# Patient Record
Sex: MALE | Race: WHITE | NOT HISPANIC OR LATINO | Employment: OTHER | URBAN - METROPOLITAN AREA
[De-identification: names, ages, dates, MRNs, and addresses within clinical notes are randomized per-mention and may not be internally consistent; named-entity substitution may affect disease eponyms.]

---

## 2019-06-13 ENCOUNTER — HOSPITAL ENCOUNTER (OUTPATIENT)
Dept: RADIOLOGY | Facility: MEDICAL CENTER | Age: 79
End: 2019-06-13
Attending: UROLOGY
Payer: MEDICARE

## 2019-06-13 DIAGNOSIS — C61 MALIGNANT NEOPLASM OF PROSTATE (HCC): ICD-10-CM

## 2019-06-13 PROCEDURE — A9588 FLUCICLOVINE F-18: HCPCS

## 2020-11-05 ENCOUNTER — APPOINTMENT (OUTPATIENT)
Dept: RADIOLOGY | Facility: MEDICAL CENTER | Age: 80
DRG: 453 | End: 2020-11-05
Attending: EMERGENCY MEDICINE
Payer: MEDICARE

## 2020-11-05 ENCOUNTER — HOSPITAL ENCOUNTER (OUTPATIENT)
Dept: RADIOLOGY | Facility: MEDICAL CENTER | Age: 80
End: 2020-11-05
Payer: MEDICARE

## 2020-11-05 ENCOUNTER — ANESTHESIA EVENT (OUTPATIENT)
Dept: SURGERY | Facility: MEDICAL CENTER | Age: 80
DRG: 453 | End: 2020-11-05
Payer: MEDICARE

## 2020-11-05 ENCOUNTER — ANESTHESIA (OUTPATIENT)
Dept: SURGERY | Facility: MEDICAL CENTER | Age: 80
DRG: 453 | End: 2020-11-05
Payer: MEDICARE

## 2020-11-05 ENCOUNTER — APPOINTMENT (OUTPATIENT)
Dept: RADIOLOGY | Facility: MEDICAL CENTER | Age: 80
DRG: 453 | End: 2020-11-05
Attending: NEUROLOGICAL SURGERY
Payer: MEDICARE

## 2020-11-05 ENCOUNTER — HOSPITAL ENCOUNTER (INPATIENT)
Facility: MEDICAL CENTER | Age: 80
LOS: 6 days | DRG: 453 | End: 2020-11-11
Attending: EMERGENCY MEDICINE | Admitting: SURGERY
Payer: MEDICARE

## 2020-11-05 ENCOUNTER — APPOINTMENT (OUTPATIENT)
Dept: RADIOLOGY | Facility: MEDICAL CENTER | Age: 80
DRG: 453 | End: 2020-11-05
Attending: NURSE PRACTITIONER
Payer: MEDICARE

## 2020-11-05 DIAGNOSIS — S15.101A: ICD-10-CM

## 2020-11-05 DIAGNOSIS — R13.12 OROPHARYNGEAL DYSPHAGIA: ICD-10-CM
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
## 2020-11-05 DIAGNOSIS — S42.202A TRAUMATIC CLOSED NONDISPLACED FRACTURE OF PROXIMAL END OF LEFT HUMERUS, INITIAL ENCOUNTER: ICD-10-CM

## 2020-11-05 DIAGNOSIS — S06.5XAA SUBDURAL HEMATOMA, ACUTE (HCC): ICD-10-CM

## 2020-11-05 DIAGNOSIS — S12.500A CLOSED DISPLACED FRACTURE OF SIXTH CERVICAL VERTEBRA, UNSPECIFIED FRACTURE MORPHOLOGY, INITIAL ENCOUNTER (HCC): ICD-10-CM

## 2020-11-05 DIAGNOSIS — S12.590A OTHER CLOSED DISPLACED FRACTURE OF SIXTH CERVICAL VERTEBRA, INITIAL ENCOUNTER (HCC): ICD-10-CM

## 2020-11-05 DIAGNOSIS — Z02.9 DISCHARGE PLANNING ISSUES: ICD-10-CM

## 2020-11-05 PROBLEM — I62.00 SUBDURAL HEMORRHAGE (HCC): Status: ACTIVE | Noted: 2020-11-05

## 2020-11-05 PROBLEM — Z53.09 CONTRAINDICATION TO DEEP VEIN THROMBOSIS (DVT) PROPHYLAXIS: Status: ACTIVE | Noted: 2020-11-05

## 2020-11-05 PROBLEM — T14.90XA TRAUMA: Status: ACTIVE | Noted: 2020-11-05

## 2020-11-05 PROBLEM — Z11.9 SCREENING EXAMINATION FOR INFECTIOUS DISEASE: Status: ACTIVE | Noted: 2020-11-05

## 2020-11-05 PROBLEM — E11.9 DM2 (DIABETES MELLITUS, TYPE 2) (HCC): Status: ACTIVE | Noted: 2020-11-05

## 2020-11-05 LAB
ABO + RH BLD: NORMAL
ABO GROUP BLD: NORMAL
ALBUMIN SERPL BCP-MCNC: 4.5 G/DL (ref 3.2–4.9)
ALBUMIN/GLOB SERPL: 1.6 G/DL
ALP SERPL-CCNC: 53 U/L (ref 30–99)
ALT SERPL-CCNC: 38 U/L (ref 2–50)
ANION GAP SERPL CALC-SCNC: 15 MMOL/L (ref 7–16)
APTT PPP: 26.5 SEC (ref 24.7–36)
AST SERPL-CCNC: 41 U/L (ref 12–45)
BILIRUB SERPL-MCNC: 0.4 MG/DL (ref 0.1–1.5)
BLD GP AB SCN SERPL QL: NORMAL
BUN SERPL-MCNC: 23 MG/DL (ref 8–22)
CALCIUM SERPL-MCNC: 10.4 MG/DL (ref 8.5–10.5)
CFT BLD TEG: 4.7 MIN (ref 5–10)
CHLORIDE SERPL-SCNC: 102 MMOL/L (ref 96–112)
CLOT ANGLE BLD TEG: 60.9 DEGREES (ref 53–72)
CLOT LYSIS 30M P MA LENFR BLD TEG: 0 % (ref 0–8)
CO2 SERPL-SCNC: 22 MMOL/L (ref 20–33)
COVID ORDER STATUS COVID19: NORMAL
CREAT SERPL-MCNC: 1.05 MG/DL (ref 0.5–1.4)
CT.EXTRINSIC BLD ROTEM: 2.2 MIN (ref 1–3)
EKG IMPRESSION: NORMAL
ERYTHROCYTE [DISTWIDTH] IN BLOOD BY AUTOMATED COUNT: 44.7 FL (ref 35.9–50)
ETHANOL BLD-MCNC: <10.1 MG/DL (ref 0–10)
GLOBULIN SER CALC-MCNC: 2.8 G/DL (ref 1.9–3.5)
GLUCOSE BLD-MCNC: 222 MG/DL (ref 65–99)
GLUCOSE BLD-MCNC: 236 MG/DL (ref 65–99)
GLUCOSE SERPL-MCNC: 145 MG/DL (ref 65–99)
HCT VFR BLD AUTO: 44.1 % (ref 42–52)
HGB BLD-MCNC: 15 G/DL (ref 14–18)
INR PPP: 1.02 (ref 0.87–1.13)
MCF BLD TEG: 65.7 MM (ref 50–70)
MCH RBC QN AUTO: 30.2 PG (ref 27–33)
MCHC RBC AUTO-ENTMCNC: 34 G/DL (ref 33.7–35.3)
MCV RBC AUTO: 88.7 FL (ref 81.4–97.8)
PA AA BLD-ACNC: 94.7 %
PA ADP BLD-ACNC: 53.4 %
PLATELET # BLD AUTO: 206 K/UL (ref 164–446)
PMV BLD AUTO: 10.5 FL (ref 9–12.9)
POTASSIUM SERPL-SCNC: 4.7 MMOL/L (ref 3.6–5.5)
PROT SERPL-MCNC: 7.3 G/DL (ref 6–8.2)
PROTHROMBIN TIME: 13.7 SEC (ref 12–14.6)
RBC # BLD AUTO: 4.97 M/UL (ref 4.7–6.1)
RH BLD: NORMAL
SARS-COV-2 RNA RESP QL NAA+PROBE: NOTDETECTED
SODIUM SERPL-SCNC: 139 MMOL/L (ref 135–145)
SPECIMEN SOURCE: NORMAL
TEG ALGORITHM TGALG: ABNORMAL
WBC # BLD AUTO: 15.5 K/UL (ref 4.8–10.8)

## 2020-11-05 PROCEDURE — 86900 BLOOD TYPING SEROLOGIC ABO: CPT

## 2020-11-05 PROCEDURE — 80053 COMPREHEN METABOLIC PANEL: CPT

## 2020-11-05 PROCEDURE — 770001 HCHG ROOM/CARE - MED/SURG/GYN PRIV*

## 2020-11-05 PROCEDURE — 93005 ELECTROCARDIOGRAM TRACING: CPT | Performed by: NURSE PRACTITIONER

## 2020-11-05 PROCEDURE — 94760 N-INVAS EAR/PLS OXIMETRY 1: CPT

## 2020-11-05 PROCEDURE — 80307 DRUG TEST PRSMV CHEM ANLYZR: CPT

## 2020-11-05 PROCEDURE — 0RT30ZZ RESECTION OF CERVICAL VERTEBRAL DISC, OPEN APPROACH: ICD-10-PCS | Performed by: NEUROLOGICAL SURGERY

## 2020-11-05 PROCEDURE — 700111 HCHG RX REV CODE 636 W/ 250 OVERRIDE (IP): Performed by: ANESTHESIOLOGY

## 2020-11-05 PROCEDURE — 99285 EMERGENCY DEPT VISIT HI MDM: CPT

## 2020-11-05 PROCEDURE — 160041 HCHG SURGERY MINUTES - EA ADDL 1 MIN LEVEL 4: Performed by: NEUROLOGICAL SURGERY

## 2020-11-05 PROCEDURE — 502000 HCHG MISC OR IMPLANTS RC 0278: Performed by: NEUROLOGICAL SURGERY

## 2020-11-05 PROCEDURE — 305948 HCHG GREEN TRAUMA ACT PRE-NOTIFY NO CC

## 2020-11-05 PROCEDURE — C9803 HOPD COVID-19 SPEC COLLECT: HCPCS | Performed by: NURSE PRACTITIONER

## 2020-11-05 PROCEDURE — 500112 HCHG BONE WAX: Performed by: NEUROLOGICAL SURGERY

## 2020-11-05 PROCEDURE — 502240 HCHG MISC OR SUPPLY RC 0272: Performed by: NEUROLOGICAL SURGERY

## 2020-11-05 PROCEDURE — 500367 HCHG DRAIN KIT, HEMOVAC: Performed by: NEUROLOGICAL SURGERY

## 2020-11-05 PROCEDURE — 0RG10A0 FUSION OF CERVICAL VERTEBRAL JOINT WITH INTERBODY FUSION DEVICE, ANTERIOR APPROACH, ANTERIOR COLUMN, OPEN APPROACH: ICD-10-PCS | Performed by: NEUROLOGICAL SURGERY

## 2020-11-05 PROCEDURE — 85384 FIBRINOGEN ACTIVITY: CPT

## 2020-11-05 PROCEDURE — 86850 RBC ANTIBODY SCREEN: CPT

## 2020-11-05 PROCEDURE — 85610 PROTHROMBIN TIME: CPT

## 2020-11-05 PROCEDURE — 160029 HCHG SURGERY MINUTES - 1ST 30 MINS LEVEL 4: Performed by: NEUROLOGICAL SURGERY

## 2020-11-05 PROCEDURE — 110454 HCHG SHELL REV 250: Performed by: NEUROLOGICAL SURGERY

## 2020-11-05 PROCEDURE — 4A11X4G MONITORING OF PERIPHERAL NERVOUS ELECTRICAL ACTIVITY, INTRAOPERATIVE, EXTERNAL APPROACH: ICD-10-PCS | Performed by: NEUROLOGICAL SURGERY

## 2020-11-05 PROCEDURE — 72040 X-RAY EXAM NECK SPINE 2-3 VW: CPT

## 2020-11-05 PROCEDURE — 160035 HCHG PACU - 1ST 60 MINS PHASE I: Performed by: NEUROLOGICAL SURGERY

## 2020-11-05 PROCEDURE — 700105 HCHG RX REV CODE 258: Performed by: NURSE PRACTITIONER

## 2020-11-05 PROCEDURE — 72141 MRI NECK SPINE W/O DYE: CPT

## 2020-11-05 PROCEDURE — 700111 HCHG RX REV CODE 636 W/ 250 OVERRIDE (IP): Performed by: NEUROLOGICAL SURGERY

## 2020-11-05 PROCEDURE — 160048 HCHG OR STATISTICAL LEVEL 1-5: Performed by: NEUROLOGICAL SURGERY

## 2020-11-05 PROCEDURE — 95939 C MOTOR EVOKED UPR&LWR LIMBS: CPT | Performed by: NEUROLOGICAL SURGERY

## 2020-11-05 PROCEDURE — 160002 HCHG RECOVERY MINUTES (STAT): Performed by: NEUROLOGICAL SURGERY

## 2020-11-05 PROCEDURE — 160009 HCHG ANES TIME/MIN: Performed by: NEUROLOGICAL SURGERY

## 2020-11-05 PROCEDURE — 95925 SOMATOSENSORY TESTING: CPT | Performed by: NEUROLOGICAL SURGERY

## 2020-11-05 PROCEDURE — 85730 THROMBOPLASTIN TIME PARTIAL: CPT

## 2020-11-05 PROCEDURE — 500864 HCHG NEEDLE, SPINAL 18G: Performed by: NEUROLOGICAL SURGERY

## 2020-11-05 PROCEDURE — 95940 IONM IN OPERATNG ROOM 15 MIN: CPT | Performed by: NEUROLOGICAL SURGERY

## 2020-11-05 PROCEDURE — 501838 HCHG SUTURE GENERAL: Performed by: NEUROLOGICAL SURGERY

## 2020-11-05 PROCEDURE — U0003 INFECTIOUS AGENT DETECTION BY NUCLEIC ACID (DNA OR RNA); SEVERE ACUTE RESPIRATORY SYNDROME CORONAVIRUS 2 (SARS-COV-2) (CORONAVIRUS DISEASE [COVID-19]), AMPLIFIED PROBE TECHNIQUE, MAKING USE OF HIGH THROUGHPUT TECHNOLOGIES AS DESCRIBED BY CMS-2020-01-R: HCPCS

## 2020-11-05 PROCEDURE — 700101 HCHG RX REV CODE 250: Performed by: NEUROLOGICAL SURGERY

## 2020-11-05 PROCEDURE — 85027 COMPLETE CBC AUTOMATED: CPT

## 2020-11-05 PROCEDURE — C1713 ANCHOR/SCREW BN/BN,TIS/BN: HCPCS | Performed by: NEUROLOGICAL SURGERY

## 2020-11-05 PROCEDURE — 95937 NEUROMUSCULAR JUNCTION TEST: CPT | Performed by: NEUROLOGICAL SURGERY

## 2020-11-05 PROCEDURE — 36415 COLL VENOUS BLD VENIPUNCTURE: CPT

## 2020-11-05 PROCEDURE — 86901 BLOOD TYPING SEROLOGIC RH(D): CPT

## 2020-11-05 PROCEDURE — 71045 X-RAY EXAM CHEST 1 VIEW: CPT

## 2020-11-05 PROCEDURE — 82962 GLUCOSE BLOOD TEST: CPT

## 2020-11-05 PROCEDURE — 700105 HCHG RX REV CODE 258: Performed by: ANESTHESIOLOGY

## 2020-11-05 PROCEDURE — 95861 NEEDLE EMG 2 EXTREMITIES: CPT | Performed by: NEUROLOGICAL SURGERY

## 2020-11-05 PROCEDURE — 85347 COAGULATION TIME ACTIVATED: CPT

## 2020-11-05 PROCEDURE — 0RG2070 FUSION OF 2 OR MORE CERVICAL VERTEBRAL JOINTS WITH AUTOLOGOUS TISSUE SUBSTITUTE, ANTERIOR APPROACH, ANTERIOR COLUMN, OPEN APPROACH: ICD-10-PCS | Performed by: NEUROLOGICAL SURGERY

## 2020-11-05 PROCEDURE — 700101 HCHG RX REV CODE 250: Performed by: ANESTHESIOLOGY

## 2020-11-05 PROCEDURE — 85576 BLOOD PLATELET AGGREGATION: CPT | Mod: 91

## 2020-11-05 PROCEDURE — 70450 CT HEAD/BRAIN W/O DYE: CPT

## 2020-11-05 DEVICE — IMPLANTABLE DEVICE: Type: IMPLANTABLE DEVICE | Status: FUNCTIONAL

## 2020-11-05 RX ORDER — FAMOTIDINE 20 MG/1
20 TABLET, FILM COATED ORAL 2 TIMES DAILY
Status: DISCONTINUED | OUTPATIENT
Start: 2020-11-05 | End: 2020-11-06

## 2020-11-05 RX ORDER — DIPHENHYDRAMINE HYDROCHLORIDE 50 MG/ML
25 INJECTION INTRAMUSCULAR; INTRAVENOUS EVERY 6 HOURS PRN
Status: DISCONTINUED | OUTPATIENT
Start: 2020-11-05 | End: 2020-11-07

## 2020-11-05 RX ORDER — DEXAMETHASONE SODIUM PHOSPHATE 4 MG/ML
INJECTION, SOLUTION INTRA-ARTICULAR; INTRALESIONAL; INTRAMUSCULAR; INTRAVENOUS; SOFT TISSUE PRN
Status: DISCONTINUED | OUTPATIENT
Start: 2020-11-05 | End: 2020-11-05 | Stop reason: SURG

## 2020-11-05 RX ORDER — IBUPROFEN 200 MG
600 CAPSULE ORAL EVERY MORNING
Status: ON HOLD | COMMUNITY
End: 2020-11-17

## 2020-11-05 RX ORDER — DOCUSATE SODIUM 100 MG/1
100 CAPSULE, LIQUID FILLED ORAL 2 TIMES DAILY
Status: DISCONTINUED | OUTPATIENT
Start: 2020-11-05 | End: 2020-11-10

## 2020-11-05 RX ORDER — CEFAZOLIN SODIUM 1 G/3ML
INJECTION, POWDER, FOR SOLUTION INTRAMUSCULAR; INTRAVENOUS
Status: DISCONTINUED | OUTPATIENT
Start: 2020-11-05 | End: 2020-11-05 | Stop reason: HOSPADM

## 2020-11-05 RX ORDER — ACETAMINOPHEN 325 MG/1
650 TABLET ORAL EVERY 6 HOURS
Status: DISPENSED | OUTPATIENT
Start: 2020-11-05 | End: 2020-11-10

## 2020-11-05 RX ORDER — BUPIVACAINE HYDROCHLORIDE AND EPINEPHRINE 5; 5 MG/ML; UG/ML
INJECTION, SOLUTION EPIDURAL; INTRACAUDAL; PERINEURAL
Status: DISCONTINUED | OUTPATIENT
Start: 2020-11-05 | End: 2020-11-05 | Stop reason: HOSPADM

## 2020-11-05 RX ORDER — SODIUM CHLORIDE, SODIUM LACTATE, POTASSIUM CHLORIDE, CALCIUM CHLORIDE 600; 310; 30; 20 MG/100ML; MG/100ML; MG/100ML; MG/100ML
INJECTION, SOLUTION INTRAVENOUS CONTINUOUS
Status: DISCONTINUED | OUTPATIENT
Start: 2020-11-05 | End: 2020-11-05 | Stop reason: HOSPADM

## 2020-11-05 RX ORDER — OXYCODONE HCL 5 MG/5 ML
10 SOLUTION, ORAL ORAL
Status: DISCONTINUED | OUTPATIENT
Start: 2020-11-05 | End: 2020-11-05 | Stop reason: HOSPADM

## 2020-11-05 RX ORDER — METOPROLOL TARTRATE 1 MG/ML
1 INJECTION, SOLUTION INTRAVENOUS
Status: DISCONTINUED | OUTPATIENT
Start: 2020-11-05 | End: 2020-11-05 | Stop reason: HOSPADM

## 2020-11-05 RX ORDER — AMOXICILLIN 250 MG
1 CAPSULE ORAL NIGHTLY
Status: DISCONTINUED | OUTPATIENT
Start: 2020-11-05 | End: 2020-11-10

## 2020-11-05 RX ORDER — SITAGLIPTIN 100 MG/1
100 TABLET, FILM COATED ORAL DAILY
Status: ON HOLD | COMMUNITY
Start: 2020-10-01 | End: 2020-11-22 | Stop reason: SDUPTHER

## 2020-11-05 RX ORDER — HYDROMORPHONE HYDROCHLORIDE 1 MG/ML
0.4 INJECTION, SOLUTION INTRAMUSCULAR; INTRAVENOUS; SUBCUTANEOUS
Status: DISCONTINUED | OUTPATIENT
Start: 2020-11-05 | End: 2020-11-05 | Stop reason: HOSPADM

## 2020-11-05 RX ORDER — POLYETHYLENE GLYCOL 3350 17 G/17G
1 POWDER, FOR SOLUTION ORAL 2 TIMES DAILY
Status: DISCONTINUED | OUTPATIENT
Start: 2020-11-05 | End: 2020-11-05

## 2020-11-05 RX ORDER — ONDANSETRON 2 MG/ML
INJECTION INTRAMUSCULAR; INTRAVENOUS PRN
Status: DISCONTINUED | OUTPATIENT
Start: 2020-11-05 | End: 2020-11-05 | Stop reason: SURG

## 2020-11-05 RX ORDER — AMOXICILLIN 250 MG
1 CAPSULE ORAL NIGHTLY
Status: DISCONTINUED | OUTPATIENT
Start: 2020-11-05 | End: 2020-11-05

## 2020-11-05 RX ORDER — HYDROMORPHONE HYDROCHLORIDE 1 MG/ML
0.2 INJECTION, SOLUTION INTRAMUSCULAR; INTRAVENOUS; SUBCUTANEOUS
Status: DISCONTINUED | OUTPATIENT
Start: 2020-11-05 | End: 2020-11-05 | Stop reason: HOSPADM

## 2020-11-05 RX ORDER — OXYCODONE HCL 5 MG/5 ML
5 SOLUTION, ORAL ORAL
Status: DISCONTINUED | OUTPATIENT
Start: 2020-11-05 | End: 2020-11-05 | Stop reason: HOSPADM

## 2020-11-05 RX ORDER — HEPARIN SODIUM 5000 [USP'U]/ML
5000 INJECTION, SOLUTION INTRAVENOUS; SUBCUTANEOUS EVERY 8 HOURS
Status: DISCONTINUED | OUTPATIENT
Start: 2020-11-06 | End: 2020-11-10

## 2020-11-05 RX ORDER — DOCUSATE SODIUM 100 MG/1
100 CAPSULE, LIQUID FILLED ORAL 2 TIMES DAILY
Status: DISCONTINUED | OUTPATIENT
Start: 2020-11-05 | End: 2020-11-05

## 2020-11-05 RX ORDER — CEFAZOLIN SODIUM 1 G/3ML
INJECTION, POWDER, FOR SOLUTION INTRAMUSCULAR; INTRAVENOUS PRN
Status: DISCONTINUED | OUTPATIENT
Start: 2020-11-05 | End: 2020-11-05 | Stop reason: SURG

## 2020-11-05 RX ORDER — POLYETHYLENE GLYCOL 3350 17 G/17G
1 POWDER, FOR SOLUTION ORAL 2 TIMES DAILY PRN
Status: DISCONTINUED | OUTPATIENT
Start: 2020-11-05 | End: 2020-11-10

## 2020-11-05 RX ORDER — METFORMIN HYDROCHLORIDE EXTENDED-RELEASE TABLETS 1000 MG/1
1000 TABLET, FILM COATED, EXTENDED RELEASE ORAL
Status: ON HOLD | COMMUNITY
Start: 2020-10-01 | End: 2020-11-22 | Stop reason: SDUPTHER

## 2020-11-05 RX ORDER — AMOXICILLIN 250 MG
1 CAPSULE ORAL
Status: DISCONTINUED | OUTPATIENT
Start: 2020-11-05 | End: 2020-11-11 | Stop reason: HOSPADM

## 2020-11-05 RX ORDER — BISACODYL 10 MG
10 SUPPOSITORY, RECTAL RECTAL
Status: DISCONTINUED | OUTPATIENT
Start: 2020-11-05 | End: 2020-11-10

## 2020-11-05 RX ORDER — LEVETIRACETAM 500 MG/1
500 TABLET ORAL 2 TIMES DAILY
Status: DISCONTINUED | OUTPATIENT
Start: 2020-11-05 | End: 2020-11-11 | Stop reason: HOSPADM

## 2020-11-05 RX ORDER — ENEMA 19; 7 G/133ML; G/133ML
1 ENEMA RECTAL
Status: DISCONTINUED | OUTPATIENT
Start: 2020-11-05 | End: 2020-11-11 | Stop reason: HOSPADM

## 2020-11-05 RX ORDER — SODIUM CHLORIDE, SODIUM LACTATE, POTASSIUM CHLORIDE, CALCIUM CHLORIDE 600; 310; 30; 20 MG/100ML; MG/100ML; MG/100ML; MG/100ML
INJECTION, SOLUTION INTRAVENOUS
Status: DISCONTINUED | OUTPATIENT
Start: 2020-11-05 | End: 2020-11-05 | Stop reason: SURG

## 2020-11-05 RX ORDER — ONDANSETRON 2 MG/ML
4 INJECTION INTRAMUSCULAR; INTRAVENOUS
Status: DISCONTINUED | OUTPATIENT
Start: 2020-11-05 | End: 2020-11-05 | Stop reason: HOSPADM

## 2020-11-05 RX ORDER — ALBUTEROL SULFATE 90 UG/1
2 AEROSOL, METERED RESPIRATORY (INHALATION) EVERY 6 HOURS PRN
Status: ON HOLD | COMMUNITY
Start: 2020-09-28 | End: 2020-11-22 | Stop reason: SDUPTHER

## 2020-11-05 RX ORDER — HALOPERIDOL 5 MG/ML
1 INJECTION INTRAMUSCULAR
Status: DISCONTINUED | OUTPATIENT
Start: 2020-11-05 | End: 2020-11-05 | Stop reason: HOSPADM

## 2020-11-05 RX ORDER — LIDOCAINE HYDROCHLORIDE 20 MG/ML
INJECTION, SOLUTION EPIDURAL; INFILTRATION; INTRACAUDAL; PERINEURAL PRN
Status: DISCONTINUED | OUTPATIENT
Start: 2020-11-05 | End: 2020-11-05 | Stop reason: SURG

## 2020-11-05 RX ORDER — ONDANSETRON 4 MG/1
4 TABLET, ORALLY DISINTEGRATING ORAL EVERY 4 HOURS PRN
Status: DISCONTINUED | OUTPATIENT
Start: 2020-11-05 | End: 2020-11-10

## 2020-11-05 RX ORDER — LOVASTATIN 40 MG/1
40 TABLET ORAL DAILY
Status: ON HOLD | COMMUNITY
Start: 2020-09-19 | End: 2020-11-22 | Stop reason: SDUPTHER

## 2020-11-05 RX ORDER — LABETALOL HYDROCHLORIDE 5 MG/ML
10 INJECTION, SOLUTION INTRAVENOUS EVERY 6 HOURS PRN
Status: DISCONTINUED | OUTPATIENT
Start: 2020-11-05 | End: 2020-11-10

## 2020-11-05 RX ORDER — CEFAZOLIN SODIUM 2 G/100ML
2 INJECTION, SOLUTION INTRAVENOUS EVERY 8 HOURS
Status: COMPLETED | OUTPATIENT
Start: 2020-11-06 | End: 2020-11-06

## 2020-11-05 RX ORDER — HYDROMORPHONE HYDROCHLORIDE 1 MG/ML
0.25 INJECTION, SOLUTION INTRAMUSCULAR; INTRAVENOUS; SUBCUTANEOUS
Status: DISCONTINUED | OUTPATIENT
Start: 2020-11-05 | End: 2020-11-06

## 2020-11-05 RX ORDER — SUCCINYLCHOLINE/SOD CL,ISO/PF 200MG/10ML
SYRINGE (ML) INTRAVENOUS PRN
Status: DISCONTINUED | OUTPATIENT
Start: 2020-11-05 | End: 2020-11-05 | Stop reason: SURG

## 2020-11-05 RX ORDER — ENEMA 19; 7 G/133ML; G/133ML
1 ENEMA RECTAL
Status: DISCONTINUED | OUTPATIENT
Start: 2020-11-05 | End: 2020-11-05

## 2020-11-05 RX ORDER — METHOCARBAMOL 750 MG/1
750 TABLET, FILM COATED ORAL EVERY 8 HOURS PRN
Status: DISCONTINUED | OUTPATIENT
Start: 2020-11-05 | End: 2020-11-11 | Stop reason: HOSPADM

## 2020-11-05 RX ORDER — LABETALOL HYDROCHLORIDE 5 MG/ML
5 INJECTION, SOLUTION INTRAVENOUS
Status: DISCONTINUED | OUTPATIENT
Start: 2020-11-05 | End: 2020-11-05 | Stop reason: HOSPADM

## 2020-11-05 RX ORDER — AMOXICILLIN 250 MG
1 CAPSULE ORAL
Status: DISCONTINUED | OUTPATIENT
Start: 2020-11-05 | End: 2020-11-05

## 2020-11-05 RX ORDER — SODIUM CHLORIDE 9 MG/ML
INJECTION, SOLUTION INTRAVENOUS CONTINUOUS
Status: DISCONTINUED | OUTPATIENT
Start: 2020-11-05 | End: 2020-11-07

## 2020-11-05 RX ORDER — DIPHENHYDRAMINE HCL 25 MG
25 TABLET ORAL EVERY 6 HOURS PRN
Status: DISCONTINUED | OUTPATIENT
Start: 2020-11-05 | End: 2020-11-07

## 2020-11-05 RX ORDER — OXYCODONE HYDROCHLORIDE 5 MG/1
2.5 TABLET ORAL
Status: DISCONTINUED | OUTPATIENT
Start: 2020-11-05 | End: 2020-11-11 | Stop reason: HOSPADM

## 2020-11-05 RX ORDER — ONDANSETRON 2 MG/ML
4 INJECTION INTRAMUSCULAR; INTRAVENOUS EVERY 4 HOURS PRN
Status: DISCONTINUED | OUTPATIENT
Start: 2020-11-05 | End: 2020-11-05

## 2020-11-05 RX ORDER — PHENYLEPHRINE HCL IN 0.9% NACL 0.5 MG/5ML
SYRINGE (ML) INTRAVENOUS PRN
Status: DISCONTINUED | OUTPATIENT
Start: 2020-11-05 | End: 2020-11-05 | Stop reason: SURG

## 2020-11-05 RX ORDER — DEXTROSE MONOHYDRATE 25 G/50ML
50 INJECTION, SOLUTION INTRAVENOUS
Status: DISCONTINUED | OUTPATIENT
Start: 2020-11-05 | End: 2020-11-11 | Stop reason: HOSPADM

## 2020-11-05 RX ORDER — HYDROMORPHONE HYDROCHLORIDE 1 MG/ML
0.1 INJECTION, SOLUTION INTRAMUSCULAR; INTRAVENOUS; SUBCUTANEOUS
Status: DISCONTINUED | OUTPATIENT
Start: 2020-11-05 | End: 2020-11-05 | Stop reason: HOSPADM

## 2020-11-05 RX ORDER — ONDANSETRON 2 MG/ML
4 INJECTION INTRAMUSCULAR; INTRAVENOUS EVERY 4 HOURS PRN
Status: DISCONTINUED | OUTPATIENT
Start: 2020-11-05 | End: 2020-11-10

## 2020-11-05 RX ORDER — OXYCODONE HYDROCHLORIDE 5 MG/1
5 TABLET ORAL
Status: DISCONTINUED | OUTPATIENT
Start: 2020-11-05 | End: 2020-11-08

## 2020-11-05 RX ORDER — REMIFENTANIL HYDROCHLORIDE 1 MG/ML
INJECTION, POWDER, LYOPHILIZED, FOR SOLUTION INTRAVENOUS
Status: DISCONTINUED | OUTPATIENT
Start: 2020-11-05 | End: 2020-11-05 | Stop reason: SURG

## 2020-11-05 RX ADMIN — Medication 100 MCG: at 17:38

## 2020-11-05 RX ADMIN — LIDOCAINE HYDROCHLORIDE 100 MG: 20 INJECTION, SOLUTION EPIDURAL; INFILTRATION; INTRACAUDAL at 17:18

## 2020-11-05 RX ADMIN — DEXAMETHASONE SODIUM PHOSPHATE 10 MG: 4 INJECTION, SOLUTION INTRA-ARTICULAR; INTRALESIONAL; INTRAMUSCULAR; INTRAVENOUS; SOFT TISSUE at 17:18

## 2020-11-05 RX ADMIN — Medication 200 MG: at 17:18

## 2020-11-05 RX ADMIN — REMIFENTANIL HYDROCHLORIDE 0.13 MCG/KG/MIN: 1 INJECTION, POWDER, LYOPHILIZED, FOR SOLUTION INTRAVENOUS at 17:18

## 2020-11-05 RX ADMIN — Medication 100 MCG: at 17:32

## 2020-11-05 RX ADMIN — PHENYLEPHRINE HYDROCHLORIDE 60 MCG/MIN: 10 INJECTION INTRAVENOUS at 18:08

## 2020-11-05 RX ADMIN — CEFAZOLIN 2 G: 330 INJECTION, POWDER, FOR SOLUTION INTRAMUSCULAR; INTRAVENOUS at 17:18

## 2020-11-05 RX ADMIN — Medication 100 MCG: at 17:28

## 2020-11-05 RX ADMIN — SODIUM CHLORIDE, POTASSIUM CHLORIDE, SODIUM LACTATE AND CALCIUM CHLORIDE: 600; 310; 30; 20 INJECTION, SOLUTION INTRAVENOUS at 17:08

## 2020-11-05 RX ADMIN — SODIUM CHLORIDE: 9 INJECTION, SOLUTION INTRAVENOUS at 16:36

## 2020-11-05 RX ADMIN — ONDANSETRON 4 MG: 2 INJECTION INTRAMUSCULAR; INTRAVENOUS at 20:00

## 2020-11-05 RX ADMIN — EPHEDRINE SULFATE 10 MG: 50 INJECTION, SOLUTION INTRAVENOUS at 17:38

## 2020-11-05 RX ADMIN — Medication 100 MCG: at 17:52

## 2020-11-05 RX ADMIN — FENTANYL CITRATE 125 MCG: 50 INJECTION, SOLUTION INTRAMUSCULAR; INTRAVENOUS at 17:18

## 2020-11-05 RX ADMIN — PROPOFOL 160 MG: 10 INJECTION, EMULSION INTRAVENOUS at 17:18

## 2020-11-05 RX ADMIN — EPHEDRINE SULFATE 10 MG: 50 INJECTION, SOLUTION INTRAVENOUS at 17:52

## 2020-11-05 RX ADMIN — PROPOFOL 120 MCG/KG/MIN: 10 INJECTION, EMULSION INTRAVENOUS at 17:18

## 2020-11-05 SDOH — HEALTH STABILITY: MENTAL HEALTH: HOW OFTEN DO YOU HAVE A DRINK CONTAINING ALCOHOL?: 2-4 TIMES A MONTH

## 2020-11-05 ASSESSMENT — PATIENT HEALTH QUESTIONNAIRE - PHQ9
1. LITTLE INTEREST OR PLEASURE IN DOING THINGS: NOT AT ALL
2. FEELING DOWN, DEPRESSED, IRRITABLE, OR HOPELESS: NOT AT ALL
SUM OF ALL RESPONSES TO PHQ9 QUESTIONS 1 AND 2: 0

## 2020-11-05 ASSESSMENT — LIFESTYLE VARIABLES
HAVE YOU EVER FELT YOU SHOULD CUT DOWN ON YOUR DRINKING: NO
ON A TYPICAL DAY WHEN YOU DRINK ALCOHOL HOW MANY DRINKS DO YOU HAVE: 0
TOTAL SCORE: 0
CONSUMPTION TOTAL: NEGATIVE
HAVE PEOPLE ANNOYED YOU BY CRITICIZING YOUR DRINKING: NO
EVER HAD A DRINK FIRST THING IN THE MORNING TO STEADY YOUR NERVES TO GET RID OF A HANGOVER: NO
ALCOHOL_USE: NO
EVER FELT BAD OR GUILTY ABOUT YOUR DRINKING: NO
HOW MANY TIMES IN THE PAST YEAR HAVE YOU HAD 5 OR MORE DRINKS IN A DAY: 0
AVERAGE NUMBER OF DAYS PER WEEK YOU HAVE A DRINK CONTAINING ALCOHOL: 0

## 2020-11-05 ASSESSMENT — PAIN DESCRIPTION - PAIN TYPE: TYPE: SURGICAL PAIN

## 2020-11-05 ASSESSMENT — PAIN SCALES - GENERAL: PAIN_LEVEL: 0

## 2020-11-05 NOTE — ED TRIAGE NOTES
Chief Complaint   Patient presents with   • Trauma Green     Transfer from [facility] for SAH and C6 fracture     Pt brought in by EMS for above. Pt was getting out of his car to go golfing today when he had MGLF and hit his head on the sidewalk,  +LOC. Pt denies taking blood thinners. Pt has GCS of 14 with repetitive questioning up on arrival.

## 2020-11-05 NOTE — ASSESSMENT & PLAN NOTE
C6 vertebral body fracture with 6mm of retropulsion posteriorly resulting in severe spinal canal narrowing with involvement of pedicles with posterior displacement of the vertebral body.  MRI - with burst fracture of C6 with body with posterior elements.   - Nondisplaced compression fractures of C7 and T1.  - Discontinuity of anterior and posterior longitudinal ligaments & ligamentum flavum consistent with injury.  - Discontinuity of ligamentum flavum at the level of C1-C2.   - There is increased T2 signal intensity concerning for ligamentous injury at this level.  - Abnormal T2 hyperintensity in the posterior paraspinal muscles likely representing contusion and edema.   - Mild amount of anterior prevertebral hematoma at the levels of C7, T1 and T2.  - Mild amount of posterior lateral epidural hemorrhage noted extending from the skull base to the level of C6.  - There is anterior epidural hemorrhage at C5 and C6.  - Level of C5-6 and C6-7, there is effacement of the dorsal and ventral subarachnoid spaces secondary to the posterior retropulsion of posterior fracture and epidural hemorrhage. Severe central canal stenosis at this level.   -  Moderate central canal stenosis at the levels of C2-3, C3-4 and C4-5 secondary to the posterior lateral epidural hemorrhage.  11/5 OR for C6 corpectomy followed by C6/7 lami facetectomy and C5-T1 fusion.  11/9 Stage 2 posterior fusion, C5-T1  [place] collar  Phoenix Alvarez MD. Neurosurgeon. [facility].

## 2020-11-05 NOTE — ASSESSMENT & PLAN NOTE
Chronic condition treated with Metformin and Januvia.   Maintenance medications resumed   Insulin sliding scale.

## 2020-11-05 NOTE — ASSESSMENT & PLAN NOTE
Subdural blood tracking along the falx without significant adjacent mass effect.  Repeat interval head CT stable  TEG with platelet mapping with AA 72.4  No platelets given due to stable repeat CT   Non-operative management.  Post traumatic pharmacologic seizure prophylaxis for 1 week.  Speech Language Pathology cognitive evaluation. Recommend home health for continued speech therapy services  Phoenix Alvarez MD. Neurosurgeon. [facility].

## 2020-11-05 NOTE — ED PROVIDER NOTES
ED Provider Note    CHIEF COMPLAINT  Chief Complaint   Patient presents with   • Trauma Green     Transfer from [facility] for SAH and C6 fracture       HPI  Isaias Ortiz is a 80 y.o. male who presents to the emergency department transferred from [facility] for subdural hematoma and a C6 fracture.  The patient was walking in a parking lot and tripped in a mechanical fall and hit his head.  This was a mechanical fall this was not syncopal.  He had head pain, and neck pain.  He went to [facility] where CT scan of the head showed falcine subdural hematoma.  He is not on blood thinners.  He also has C6 fracture and was sent here for further work-up and treatment.  He has no numbness or tingling or weakness.  He did not injure his upper extremities, chest, abdomen, back or lower extremities.  Denies any other injuries or complaints.  No cough or Covid exposures.    REVIEW OF SYSTEMS  See HPI for further details. All other systems are negative.    PAST MEDICAL HISTORY  Past Medical History:   Diagnosis Date   • Chronic obstructive pulmonary disease (HCC)    • Diabetes (HCC)    • High cholesterol    • Hypertension    • Prostate cancer (HCC)     past hx       FAMILY HISTORY  History reviewed. No pertinent family history.    SOCIAL HISTORY  Social History     Socioeconomic History   • Marital status:      Spouse name: Not on file   • Number of children: Not on file   • Years of education: Not on file   • Highest education level: Not on file   Occupational History   • Not on file   Social Needs   • Financial resource strain: Not on file   • Food insecurity     Worry: Not on file     Inability: Not on file   • Transportation needs     Medical: Not on file     Non-medical: Not on file   Tobacco Use   • Smoking status: Former Smoker   • Smokeless tobacco: Never Used   Substance and Sexual Activity   • Alcohol use: Yes     Frequency: 2-4 times a month   • Drug use: Never   • Sexual activity: Not on  "file   Lifestyle   • Physical activity     Days per week: Not on file     Minutes per session: Not on file   • Stress: Not on file   Relationships   • Social connections     Talks on phone: Not on file     Gets together: Not on file     Attends Muslim service: Not on file     Active member of club or organization: Not on file     Attends meetings of clubs or organizations: Not on file     Relationship status: Not on file   • Intimate partner violence     Fear of current or ex partner: Not on file     Emotionally abused: Not on file     Physically abused: Not on file     Forced sexual activity: Not on file   Other Topics Concern   • Not on file   Social History Narrative   • Not on file       SURGICAL HISTORY  Past Surgical History:   Procedure Laterality Date   • CYST EXCISION      from rectum       CURRENT MEDICATIONS  Home Medications    **Home medications have not yet been reviewed for this encounter**         ALLERGIES  Allergies   Allergen Reactions   • Codeine Anaphylaxis   • Opium Unspecified       PHYSICAL EXAM  VITAL SIGNS: BP (!) 171/81   Pulse 89   Temp 36.8 °C (98.2 °F) (Temporal)   Resp 18   Ht 1.727 m (5' 8\")   Wt 97.1 kg (214 lb)   SpO2 94%   BMI 32.54 kg/m²    Constitutional: Awake alert nontoxic in spinal precautions no acute distress  HENT: Normocephalic, Atraumatic, Bilateral external ears normal, Oropharynx moist, No oral exudates, Nose normal.   Eyes: PERRL, EOMI, Conjunctiva normal, No discharge.   Neck: In cervical collar, not ranged.  Cardiovascular: Normal heart rate, Normal rhythm, No murmurs  Thorax & Lungs: Normal breath sounds, No respiratory distress,  Abdomen: Bowel sounds normal, Soft, No tenderness  Skin: Warm, Dry, No erythema, No rash.   Back: No tenderness, No CVA tenderness.   Musculoskeletal: Good range of motion in all major joints.  Neurologic: Alert, No focal deficits noted.  Normal strength and sensation in the upper and lower extremities.  Psychiatric: Affect " normal    Results for orders placed or performed during the hospital encounter of 11/05/20   DIAGNOSTIC ALCOHOL   Result Value Ref Range    Diagnostic Alcohol <10.1 0.0 - 10.0 mg/dL   Comp Metabolic Panel   Result Value Ref Range    Sodium 139 135 - 145 mmol/L    Potassium 4.7 3.6 - 5.5 mmol/L    Chloride 102 96 - 112 mmol/L    Co2 22 20 - 33 mmol/L    Anion Gap 15.0 7.0 - 16.0    Glucose 145 (H) 65 - 99 mg/dL    Bun 23 (H) 8 - 22 mg/dL    Creatinine 1.05 0.50 - 1.40 mg/dL    Calcium 10.4 8.5 - 10.5 mg/dL    AST(SGOT) 41 12 - 45 U/L    ALT(SGPT) 38 2 - 50 U/L    Alkaline Phosphatase 53 30 - 99 U/L    Total Bilirubin 0.4 0.1 - 1.5 mg/dL    Albumin 4.5 3.2 - 4.9 g/dL    Total Protein 7.3 6.0 - 8.2 g/dL    Globulin 2.8 1.9 - 3.5 g/dL    A-G Ratio 1.6 g/dL   CBC WITHOUT DIFFERENTIAL   Result Value Ref Range    WBC 15.5 (H) 4.8 - 10.8 K/uL    RBC 4.97 4.70 - 6.10 M/uL    Hemoglobin 15.0 14.0 - 18.0 g/dL    Hematocrit 44.1 42.0 - 52.0 %    MCV 88.7 81.4 - 97.8 fL    MCH 30.2 27.0 - 33.0 pg    MCHC 34.0 33.7 - 35.3 g/dL    RDW 44.7 35.9 - 50.0 fL    Platelet Count 206 164 - 446 K/uL    MPV 10.5 9.0 - 12.9 fL   Prothrombin Time   Result Value Ref Range    PT 13.7 12.0 - 14.6 sec    INR 1.02 0.87 - 1.13   APTT   Result Value Ref Range    APTT 26.5 24.7 - 36.0 sec   ESTIMATED GFR   Result Value Ref Range    GFR If African American >60 >60 mL/min/1.73 m 2    GFR If Non African American >60 >60 mL/min/1.73 m 2        RADIOLOGY/PROCEDURES  OUTSIDE IMAGES-CT HEAD   Final Result      OUTSIDE IMAGES-CT CERVICAL SPINE   Final Result      DX-CHEST-LIMITED (1 VIEW)   Final Result      1.  No acute cardiac or pulmonary abnormalities are identified.   2.  There appears to be a proximal left humerus fracture. Correlation with physical exam is recommended.      MR-CERVICAL SPINE-W/O    (Results Pending)   EC-ECHOCARDIOGRAM COMPLETE W/ CONT    (Results Pending)   CT-HEAD W/O    (Results Pending)       COURSE & MEDICAL DECISION  MAKING  Pertinent Labs & Imaging studies reviewed. (See chart for details)    The patient was a transfer from [facility].  Physicians notes, and images from the transfer were reviewed.    On arrival 1 view AP chest x-ray was obtained in the trauma room this was unremarkable for intrathoracic pathology.  There is a question of whether or not he had an arm fracture.    The patient's examination is not suggestive of fracture at all think he requires any further work-up or imaging for this.    The patient's head CT and cervical spine CT from the transferring hospital have been loaded and reviewed.  He has a C6 fracture that appears unstable with significant retropulsion.  He has a falcine subdural.    Discussed the case with Dr. Alvarez on-call for neurosurgery.  He requests an [place] collar and a stat MRI.  I spoke with the nurse to arrange traction applied [place] collar.  This is being performed.  Nurse is aware of the unstable nature of the cervical spine injury the patient must remain flat and remain n.p.o.  Preoperative Covid test is pending.    Dr. Alvarez request an MRI of the cervical spine be ordered stat this is completed.  I spoke with the trauma surgeon Dr. Ramos he will see the patient for hospitalization.  The patient and his wife are updated to the plan.      FINAL IMPRESSION  1. Fall, initial encounter     2. Subdural hematoma, acute (HCC)     3. Other closed displaced fracture of sixth cervical vertebra, initial encounter (HCC)     4.  Critical care time 45 minutes no procedures    2.   3.         Electronically signed by: Lavell Mckeon M.D., 11/5/2020 12:53 PM  s

## 2020-11-05 NOTE — ED NOTES
Pharmacy Medication Reconciliation    Medication reconciliation has been completed by interviewing patient with medication list and consent to do so with family/visitors in the room. Reviewed medication list and returned to pt family at bedside  Allergies were reviewed and updated   No ORAL antibiotics within the past 14 days  Pt home pharmacy:[facility]

## 2020-11-05 NOTE — PROGRESS NOTES
Consult note dictated. Unstable 3 column fracture with C6 burst and displacement over C2-6 DISH, compression. Will need trauma admit, stat MRI, keep npo  (patient last ate at 8am) and flat in collar, check covid test, OR today for C6 corpectomy followed by C6/7 lami facetectomy and C5-T1 fusion stage 2 likely on Monday. R/B/A explained to patient and he agrees.

## 2020-11-05 NOTE — ASSESSMENT & PLAN NOTE
Systemic anticoagulation contraindicated secondary to elevated bleeding risk.  11/6 Pharmacological DVT prophylaxis, Heparin, initiated.

## 2020-11-05 NOTE — CONSULTS
DATE OF SERVICE:  11/05/2020    NEUROSURGICAL INPATIENT CONSULTATION    CHIEF COMPLAINT:  Neck pain, ground level fall, 3-column C6 fracture.    HISTORY OF PRESENT ILLNESS:  This is a 80-year-old man who had a ground level   fall, reports he has had  only mild neck pain, no weakness or numbness.  His   outside hospital CAT scan of the head shows a minimal subdural hematoma   without any midline shift or fracture, but his cervical CT shows a C6 burst   fracture over a C2-C6 anterior cervical disk autofusion with retropulsion,   significant stenosis, and bilateral C6-C7 jumped facet as well as 3-column   fracture.  This is considered unstable.  He has a grade I anterolisthesis.    PAST MEDICAL HISTORY:  Significant for hypertension, hyperlipidemia, prostate   cancer, currently on hormonal treatment.    PAST SURGICAL HISTORY:  Negative except for the biopsy.    SOCIAL HISTORY:  He is a nonsmoker, social drinker.  He is a retired   .  His wife is with him at his visit.    PHYSICAL EXAMINATION:  The patient has full strength with deltoid, bicep,   tricep, , interossei, dorsiflexion, EHL, plantar flexion with intact   sensation.  No pathologic reflex.  He is awake, alert, and oriented x3.    Pupils are symmetric.  Extraocular motion intact.  Face is full.  His tongue   is midline.    ASSESSMENT AND PLAN:  The patient has an unstable 3-column fracture over a   disk autofusion.  I recommend n.p.o., stat MRI, COVID test.  He should be kept   flat.  We will go today for a C6 anterior corpectomy followed by a few days   rest and likely on Monday, we will do a posterior supplementation due to a   3-column injury and the lever arm that was created by the disk above, but   likely C5-T1 posterior fusion, admitted to the trauma service.  He was kept   flat in a collar.  He is getting a stat MRI, kept n.p.o.    I explained the risks, benefits, and alternatives of the procedure, including   pain, infection,  bleeding, CSF leak, failure to completely resolve symptoms,   neurologic deficit including weakness, numbness, bladder or bowel   difficulties, failure of fixation, failure of fusion, need for rostral caudal   extensions due to junctional stenosis, need for posterior fusion, injury to   trachea, carotid, esophagus, temporary or permanent speaking and swallowing   difficulties before surgical scheduling.    Thank you for allow me to participate in his care.       ____________________________________     MD KATHRYN Alvarado III / NTS    DD:  11/05/2020 13:57:55  DT:  11/05/2020 15:56:00    D#:  [id number]  Job#:  [id number]

## 2020-11-06 ENCOUNTER — APPOINTMENT (OUTPATIENT)
Dept: RADIOLOGY | Facility: MEDICAL CENTER | Age: 80
DRG: 453 | End: 2020-11-06
Attending: NURSE PRACTITIONER
Payer: MEDICARE

## 2020-11-06 ENCOUNTER — APPOINTMENT (OUTPATIENT)
Dept: CARDIOLOGY | Facility: MEDICAL CENTER | Age: 80
DRG: 453 | End: 2020-11-06
Attending: NURSE PRACTITIONER
Payer: MEDICARE

## 2020-11-06 PROBLEM — R55 SYNCOPE AND COLLAPSE: Status: ACTIVE | Noted: 2020-11-06

## 2020-11-06 PROBLEM — S15.101A: Status: ACTIVE | Noted: 2020-11-06

## 2020-11-06 PROBLEM — S42.202A: Status: ACTIVE | Noted: 2020-11-06

## 2020-11-06 PROBLEM — S06.5XAA TRAUMATIC SUBDURAL HEMORRHAGE (HCC): Status: ACTIVE | Noted: 2020-11-05

## 2020-11-06 PROBLEM — S15.102A: Status: ACTIVE | Noted: 2020-11-06

## 2020-11-06 PROBLEM — Z78.9 NO CONTRAINDICATION TO DEEP VEIN THROMBOSIS (DVT) PROPHYLAXIS: Status: ACTIVE | Noted: 2020-11-05

## 2020-11-06 LAB
ALBUMIN SERPL BCP-MCNC: 3.9 G/DL (ref 3.2–4.9)
ALBUMIN/GLOB SERPL: 1.5 G/DL
ALP SERPL-CCNC: 45 U/L (ref 30–99)
ALT SERPL-CCNC: 33 U/L (ref 2–50)
ANION GAP SERPL CALC-SCNC: 16 MMOL/L (ref 7–16)
AST SERPL-CCNC: 45 U/L (ref 12–45)
BASOPHILS # BLD AUTO: 0.1 % (ref 0–1.8)
BASOPHILS # BLD: 0.01 K/UL (ref 0–0.12)
BILIRUB SERPL-MCNC: 0.4 MG/DL (ref 0.1–1.5)
BUN SERPL-MCNC: 21 MG/DL (ref 8–22)
CALCIUM SERPL-MCNC: 9.2 MG/DL (ref 8.5–10.5)
CFT BLD TEG: 6.2 MIN (ref 5–10)
CFT P HPASE BLD TEG: 5.8 MIN (ref 5–10)
CHLORIDE SERPL-SCNC: 100 MMOL/L (ref 96–112)
CLOT ANGLE BLD TEG: 64.1 DEGREES (ref 53–72)
CLOT ANGLE P HPASE BLD TEG: 70.4 DEGREES (ref 53–72)
CLOT INIT P HPASE BLD TEG: 1.4 MIN (ref 1–3)
CLOT LYSIS 30M P MA LENFR BLD TEG: 0 % (ref 0–8)
CLOT LYSIS 30M P MA LENFR BLD TEG: 0 % (ref 0–8)
CO2 SERPL-SCNC: 22 MMOL/L (ref 20–33)
CREAT SERPL-MCNC: 1.08 MG/DL (ref 0.5–1.4)
CT.EXTRINSIC BLD ROTEM: 1.9 MIN (ref 1–3)
EOSINOPHIL # BLD AUTO: 0 K/UL (ref 0–0.51)
EOSINOPHIL NFR BLD: 0 % (ref 0–6.9)
ERYTHROCYTE [DISTWIDTH] IN BLOOD BY AUTOMATED COUNT: 44.4 FL (ref 35.9–50)
GLOBULIN SER CALC-MCNC: 2.6 G/DL (ref 1.9–3.5)
GLUCOSE BLD-MCNC: 133 MG/DL (ref 65–99)
GLUCOSE BLD-MCNC: 179 MG/DL (ref 65–99)
GLUCOSE BLD-MCNC: 179 MG/DL (ref 65–99)
GLUCOSE BLD-MCNC: 184 MG/DL (ref 65–99)
GLUCOSE SERPL-MCNC: 215 MG/DL (ref 65–99)
HCT VFR BLD AUTO: 40.3 % (ref 42–52)
HGB BLD-MCNC: 13.3 G/DL (ref 14–18)
IMM GRANULOCYTES # BLD AUTO: 0.07 K/UL (ref 0–0.11)
IMM GRANULOCYTES NFR BLD AUTO: 0.7 % (ref 0–0.9)
LV EJECT FRACT  99904: 60
LV EJECT FRACT MOD 4C 99902: 70.45
LYMPHOCYTES # BLD AUTO: 0.84 K/UL (ref 1–4.8)
LYMPHOCYTES NFR BLD: 8.2 % (ref 22–41)
MCF BLD TEG: 68.4 MM (ref 50–70)
MCF P HPASE BLD TEG: 70.6 MM (ref 50–70)
MCH RBC QN AUTO: 29.1 PG (ref 27–33)
MCHC RBC AUTO-ENTMCNC: 33 G/DL (ref 33.7–35.3)
MCV RBC AUTO: 88.2 FL (ref 81.4–97.8)
MONOCYTES # BLD AUTO: 0.43 K/UL (ref 0–0.85)
MONOCYTES NFR BLD AUTO: 4.2 % (ref 0–13.4)
NEUTROPHILS # BLD AUTO: 8.91 K/UL (ref 1.82–7.42)
NEUTROPHILS NFR BLD: 86.8 % (ref 44–72)
NRBC # BLD AUTO: 0 K/UL
NRBC BLD-RTO: 0 /100 WBC
PA AA BLD-ACNC: 72.4 %
PA ADP BLD-ACNC: 34.9 %
PLATELET # BLD AUTO: 199 K/UL (ref 164–446)
PMV BLD AUTO: 11.1 FL (ref 9–12.9)
POTASSIUM SERPL-SCNC: 4.2 MMOL/L (ref 3.6–5.5)
PROT SERPL-MCNC: 6.5 G/DL (ref 6–8.2)
RBC # BLD AUTO: 4.57 M/UL (ref 4.7–6.1)
SODIUM SERPL-SCNC: 138 MMOL/L (ref 135–145)
TEG ALGORITHM TGALG: NORMAL
WBC # BLD AUTO: 10.3 K/UL (ref 4.8–10.8)

## 2020-11-06 PROCEDURE — A9270 NON-COVERED ITEM OR SERVICE: HCPCS | Performed by: NURSE PRACTITIONER

## 2020-11-06 PROCEDURE — 36415 COLL VENOUS BLD VENIPUNCTURE: CPT

## 2020-11-06 PROCEDURE — 73060 X-RAY EXAM OF HUMERUS: CPT | Mod: LT

## 2020-11-06 PROCEDURE — 700102 HCHG RX REV CODE 250 W/ 637 OVERRIDE(OP): Performed by: PHYSICIAN ASSISTANT

## 2020-11-06 PROCEDURE — 700105 HCHG RX REV CODE 258: Performed by: NURSE PRACTITIONER

## 2020-11-06 PROCEDURE — 700117 HCHG RX CONTRAST REV CODE 255: Performed by: NURSE PRACTITIONER

## 2020-11-06 PROCEDURE — 80053 COMPREHEN METABOLIC PANEL: CPT

## 2020-11-06 PROCEDURE — 93306 TTE W/DOPPLER COMPLETE: CPT

## 2020-11-06 PROCEDURE — 85025 COMPLETE CBC W/AUTO DIFF WBC: CPT

## 2020-11-06 PROCEDURE — A9270 NON-COVERED ITEM OR SERVICE: HCPCS | Performed by: PHYSICIAN ASSISTANT

## 2020-11-06 PROCEDURE — 700102 HCHG RX REV CODE 250 W/ 637 OVERRIDE(OP): Performed by: NURSE PRACTITIONER

## 2020-11-06 PROCEDURE — 770001 HCHG ROOM/CARE - MED/SURG/GYN PRIV*

## 2020-11-06 PROCEDURE — 70498 CT ANGIOGRAPHY NECK: CPT

## 2020-11-06 PROCEDURE — 92523 SPEECH SOUND LANG COMPREHEN: CPT

## 2020-11-06 PROCEDURE — 82962 GLUCOSE BLOOD TEST: CPT

## 2020-11-06 PROCEDURE — 93306 TTE W/DOPPLER COMPLETE: CPT | Mod: 26 | Performed by: INTERNAL MEDICINE

## 2020-11-06 PROCEDURE — 700111 HCHG RX REV CODE 636 W/ 250 OVERRIDE (IP): Performed by: PHYSICIAN ASSISTANT

## 2020-11-06 RX ORDER — ALBUTEROL SULFATE 90 UG/1
2 AEROSOL, METERED RESPIRATORY (INHALATION) EVERY 6 HOURS PRN
Status: DISCONTINUED | OUTPATIENT
Start: 2020-11-06 | End: 2020-11-11 | Stop reason: HOSPADM

## 2020-11-06 RX ORDER — FLUTICASONE PROPIONATE 44 UG/1
2 AEROSOL, METERED RESPIRATORY (INHALATION)
Status: DISCONTINUED | OUTPATIENT
Start: 2020-11-06 | End: 2020-11-11 | Stop reason: HOSPADM

## 2020-11-06 RX ORDER — LOVASTATIN 20 MG/1
40 TABLET ORAL DAILY
Status: DISCONTINUED | OUTPATIENT
Start: 2020-11-06 | End: 2020-11-11 | Stop reason: HOSPADM

## 2020-11-06 RX ADMIN — CEFAZOLIN SODIUM 2 G: 2 INJECTION, SOLUTION INTRAVENOUS at 00:09

## 2020-11-06 RX ADMIN — THERA TABS 1 TABLET: TAB at 11:38

## 2020-11-06 RX ADMIN — HEPARIN SODIUM 5000 UNITS: 5000 INJECTION, SOLUTION INTRAVENOUS; SUBCUTANEOUS at 18:19

## 2020-11-06 RX ADMIN — INSULIN HUMAN 2 UNITS: 100 INJECTION, SOLUTION PARENTERAL at 13:51

## 2020-11-06 RX ADMIN — IOHEXOL 80 ML: 350 INJECTION, SOLUTION INTRAVENOUS at 13:46

## 2020-11-06 RX ADMIN — CEFAZOLIN SODIUM 2 G: 2 INJECTION, SOLUTION INTRAVENOUS at 09:43

## 2020-11-06 RX ADMIN — LEVETIRACETAM 500 MG: 500 TABLET ORAL at 04:52

## 2020-11-06 RX ADMIN — DOCUSATE SODIUM 100 MG: 100 CAPSULE ORAL at 00:09

## 2020-11-06 RX ADMIN — ACETAMINOPHEN 650 MG: 325 TABLET, FILM COATED ORAL at 16:47

## 2020-11-06 RX ADMIN — SODIUM CHLORIDE: 9 INJECTION, SOLUTION INTRAVENOUS at 23:10

## 2020-11-06 RX ADMIN — LOVASTATIN 40 MG: 20 TABLET ORAL at 11:38

## 2020-11-06 RX ADMIN — METHOCARBAMOL 750 MG: 750 TABLET ORAL at 19:14

## 2020-11-06 RX ADMIN — DOCUSATE SODIUM 100 MG: 100 CAPSULE ORAL at 16:47

## 2020-11-06 RX ADMIN — DOCUSATE SODIUM 50 MG AND SENNOSIDES 8.6 MG 1 TABLET: 8.6; 5 TABLET, FILM COATED ORAL at 20:45

## 2020-11-06 RX ADMIN — ACETAMINOPHEN 650 MG: 325 TABLET, FILM COATED ORAL at 04:52

## 2020-11-06 RX ADMIN — SODIUM CHLORIDE: 9 INJECTION, SOLUTION INTRAVENOUS at 11:39

## 2020-11-06 RX ADMIN — LEVETIRACETAM 500 MG: 500 TABLET ORAL at 16:47

## 2020-11-06 RX ADMIN — DOCUSATE SODIUM 100 MG: 100 CAPSULE ORAL at 04:53

## 2020-11-06 RX ADMIN — DOCUSATE SODIUM 50 MG AND SENNOSIDES 8.6 MG 1 TABLET: 8.6; 5 TABLET, FILM COATED ORAL at 00:09

## 2020-11-06 RX ADMIN — ACETAMINOPHEN 650 MG: 325 TABLET, FILM COATED ORAL at 23:09

## 2020-11-06 RX ADMIN — FAMOTIDINE 20 MG: 20 TABLET, FILM COATED ORAL at 04:52

## 2020-11-06 RX ADMIN — ACETAMINOPHEN 650 MG: 325 TABLET, FILM COATED ORAL at 11:38

## 2020-11-06 RX ADMIN — INSULIN HUMAN 2 UNITS: 100 INJECTION, SOLUTION PARENTERAL at 09:00

## 2020-11-06 RX ADMIN — ACETAMINOPHEN 650 MG: 325 TABLET, FILM COATED ORAL at 00:08

## 2020-11-06 RX ADMIN — INSULIN HUMAN 2 UNITS: 100 INJECTION, SOLUTION PARENTERAL at 20:48

## 2020-11-06 ASSESSMENT — ENCOUNTER SYMPTOMS
DOUBLE VISION: 0
FOCAL WEAKNESS: 0
VOMITING: 0
SHORTNESS OF BREATH: 0
SENSORY CHANGE: 0
NECK PAIN: 1
BACK PAIN: 0
STRIDOR: 0
ABDOMINAL PAIN: 0
FEVER: 0
MYALGIAS: 1
NAUSEA: 0
CHILLS: 0
SPEECH CHANGE: 0

## 2020-11-06 ASSESSMENT — COGNITIVE AND FUNCTIONAL STATUS - GENERAL
CLIMB 3 TO 5 STEPS WITH RAILING: A LITTLE
MOBILITY SCORE: 23
DRESSING REGULAR UPPER BODY CLOTHING: A LITTLE
DAILY ACTIVITIY SCORE: 20
SUGGESTED CMS G CODE MODIFIER MOBILITY: CI
HELP NEEDED FOR BATHING: A LITTLE
SUGGESTED CMS G CODE MODIFIER DAILY ACTIVITY: CJ
DRESSING REGULAR LOWER BODY CLOTHING: A LOT

## 2020-11-06 ASSESSMENT — PAIN DESCRIPTION - PAIN TYPE
TYPE: ACUTE PAIN;SURGICAL PAIN
TYPE: SURGICAL PAIN

## 2020-11-06 ASSESSMENT — PATIENT HEALTH QUESTIONNAIRE - PHQ9
SUM OF ALL RESPONSES TO PHQ9 QUESTIONS 1 AND 2: 0
1. LITTLE INTEREST OR PLEASURE IN DOING THINGS: NOT AT ALL
2. FEELING DOWN, DEPRESSED, IRRITABLE, OR HOPELESS: NOT AT ALL

## 2020-11-06 ASSESSMENT — COPD QUESTIONNAIRES
COPD SCREENING SCORE: 4
HAVE YOU SMOKED AT LEAST 100 CIGARETTES IN YOUR ENTIRE LIFE: NO/DON'T KNOW
DURING THE PAST 4 WEEKS HOW MUCH DID YOU FEEL SHORT OF BREATH: SOME OF THE TIME
DO YOU EVER COUGH UP ANY MUCUS OR PHLEGM?: NO/ONLY WITH OCCASIONAL COLDS OR INFECTIONS

## 2020-11-06 ASSESSMENT — FIBROSIS 4 INDEX: FIB4 SCORE: 3.07

## 2020-11-06 NOTE — ASSESSMENT & PLAN NOTE
11/5 MRI There is nonvisualization of the flow void of the right vertebral artery. This is uncertain whether this is a chronic finding or secondary to the vertebral dissection.   11/6 CTA   - right vertebral artery is patent though this small in size throughout its course no obvious dissection flap identified. The small size likely due to normal developmental anatomy though diffuse narrowing from dissection difficult to entirely exclude.   - The left vertebral artery is patent and appears dominant.  Non-operative management.  Phoenix Alvarez MD. Neurosurgeon. [facility].

## 2020-11-06 NOTE — THERAPY
Contact Note    Patient Name: Isaias Ortiz  Age:  80 y.o., Sex:  male  Medical Record #: [id number]  Today's Date: 11/6/2020    consult received; cervical post stage I; awaiting stage II; also has strict bedrest orders as well; will attempt post stage II as appropriate for dc planning/eval

## 2020-11-06 NOTE — ANESTHESIA PROCEDURE NOTES
Airway    Date/Time: 11/5/2020 5:18 PM  Performed by: Damian Jung D.O.  Authorized by: Damian Jung D.O.     Location:  OR  Urgency:  Elective  Indications for Airway Management:  Anesthesia      Spontaneous Ventilation: absent    Sedation Level:  Deep  Preoxygenated: Yes    Patient Position:  Sniffing  Mask Difficulty Assessment:  0 - not attempted  Final Airway Type:  Endotracheal airway  Final Endotracheal Airway:  NIM tube  Cuffed: Yes    Technique Used for Successful ETT Placement:  Direct laryngoscopy    Insertion Site:  Oral  Blade Type:  Glide  Laryngoscope Blade/Videolaryngoscope Blade Size:  4  Measured from:  Teeth  ETT to Teeth (cm):  25  Placement Verified by: auscultation and capnometry    Cormack-Lehane Classification:  Grade I - full view of glottis  Number of Attempts at Approach:  1

## 2020-11-06 NOTE — PROGRESS NOTES
Neurosurgery Progress Note    Subjective:  Postop day 1 C6 corpectomy with anterior cervical fusion C5-T1 for C6 burst fracture   Reports today minimal neck pain  Denies radicular arm pain  Denies dysphagia  Drain at 10 cc over 8 hours  Scheduled for stage II on Monday, posterior fusion, C5-T1    Exam:  Anterior cervical clean dry and intact, no evidence of hematoma or seroma  Motor strength 5/5  Sensation intact    BP  Min: 101/56  Max: 190/87  Pulse  Av  Min: 68  Max: 91  Resp  Av.8  Min: 13  Max: 45  Temp  Av.5 °C (97.7 °F)  Min: 35.8 °C (96.5 °F)  Max: 36.8 °C (98.3 °F)  SpO2  Av.8 %  Min: 87 %  Max: 97 %    No data recorded    Recent Labs     20  1231 20  0359   WBC 15.5* 10.3   RBC 4.97 4.57*   HEMOGLOBIN 15.0 13.3*   HEMATOCRIT 44.1 40.3*   MCV 88.7 88.2   MCH 30.2 29.1   MCHC 34.0 33.0*   RDW 44.7 44.4   PLATELETCT 206 199   MPV 10.5 11.1     Recent Labs     20  1231 20  0359   SODIUM 139 138   POTASSIUM 4.7 4.2   CHLORIDE 102 100   CO2 22 22   GLUCOSE 145* 215*   BUN 23* 21   CREATININE 1.05 1.08   CALCIUM 10.4 9.2     Recent Labs     20  1231   APTT 26.5   INR 1.02     Recent Labs     20  1231 20  2256   REACTMIN 4.7* 6.2   CLOTKINET 2.2 1.9   CLOTANGL 60.9 64.1   MAXCLOTS 65.7 68.4   OQO35MRZ 0.0 0.0   PRCINADP 53.4 34.9   PRCINAA 94.7 72.4       Intake/Output       20 - 20 - 2059      [id number] Total [id number] Total       Intake    I.V.  600  800 1400  --  -- --    Pre-Hospital Volume 0 -- 0 -- -- --    Trauma Resuscitation Volume 0 -- 0 -- -- --    Volume (mL) (Lactated Ringers)  -- -- --    Blood  0  -- 0  --  -- --    PRBC Total Volume (Non-Barcoded) 0 -- 0 -- -- --    FFP Total Volume (Non-Barcoded) 0 -- 0 -- -- --    Platelets Total Volume (Non-Barcoded) 0 -- 0 -- -- --    Cryoprecipitate (Pooled) Total Volume (Non-Barcoded) 0 -- 0 -- -- --    Total Intake 600  800 1400 -- -- --       Output    Urine  --  1500 1500  --  -- --    Urine -- 300 300 -- -- --    Output (mL) ([REMOVED] Urethral Catheter Latex;Temperature probe 16 Fr.) -- 1200 1200 -- -- --    Drains  --  10 10  --  -- --    Output (mL) (Closed/Suction Drain 1 Anterior Neck Hemovac 10 Fr.) -- 10 10 -- -- --    Other  0  -- 0  --  -- --    Pre-Hospital Output 0 -- 0 -- -- --    Trauma Resuscitation Output 0 -- 0 -- -- --    Blood  0  600 600  --  -- --    Est. Blood Loss 0 600 600 -- -- --    Total Output 0 2110 2110 -- -- --       Net I/O     600 -1310 -710 -- -- --            Intake/Output Summary (Last 24 hours) at 11/6/2020 0737  Last data filed at 11/6/2020 0500  Gross per 24 hour   Intake 1400 ml   Output 2110 ml   Net -710 ml            • Respiratory Therapy Consult   Continuous RT   • bisacodyl  10 mg Q24HRS PRN   • NS   Continuous   • acetaminophen  650 mg Q6HRS   • famotidine  20 mg BID    Or   • famotidine  20 mg BID   • levETIRAcetam  500 mg BID   • insulin regular  2-9 Units 4X/DAY ACHS    And   • glucose  16 g Q15 MIN PRN    And   • dextrose 50%  50 mL Q15 MIN PRN   • labetalol  10 mg Q6HRS PRN   • Pharmacy Consult Request  1 Each PHARMACY TO DOSE   • MD ALERT...DO NOT ADMINISTER NSAIDS or ASPIRIN unless ORDERED By Neurosurgery  1 Each PRN   • docusate sodium  100 mg BID   • senna-docusate  1 Tab Nightly   • senna-docusate  1 Tab Q24HRS PRN   • polyethylene glycol/lytes  1 Packet BID PRN   • magnesium hydroxide  30 mL QDAY PRN   • bisacodyl  10 mg Q24HRS PRN   • fleet  1 Each Once PRN   • heparin  5,000 Units Q8HRS   • ceFAZolin  2 g Q8HR   • diphenhydrAMINE  25 mg Q6HRS PRN    Or   • diphenhydrAMINE  25 mg Q6HRS PRN   • ondansetron  4 mg Q4HRS PRN   • ondansetron  4 mg Q4HRS PRN   • methocarbamol  750 mg Q8HRS PRN   • benzocaine-menthol  1 Lozenge Q2HRS PRN   • oxyCODONE immediate-release  2.5 mg Q3HRS PRN   • oxyCODONE immediate-release  5 mg Q3HRS PRN   • HYDROmorphone  0.25 mg Q3HRS PRN        Assessment and Plan:  POD #1 C6 corpectomy  Stage II Monday, posterior cervical fusion C5-T1  Keep head of bed less than 45 degrees  Leave drain in place  N.p.o. after midnight Sunday

## 2020-11-06 NOTE — ANESTHESIA POSTPROCEDURE EVALUATION
Patient: Isaias Ortiz    Procedure Summary     Date: 11/05/20 Room / Location: [facility] 05 / SURGERY [facility]    Anesthesia Start: 1708 Anesthesia Stop: 2022    Procedures:       DISCECTOMY, SPINE, CERVICAL, ANTERIOR APPROACH, WITH FUSION C5-7 (Neck)      CORPECTOMY, SPINE C6 (Neck) Diagnosis: (3-column C6 fracture)    Surgeons: Phoenix Alvarez III, M.D. Responsible Provider: Damian Jung D.O.    Anesthesia Type: general ASA Status: 2 - Emergent          Final Anesthesia Type: general  Last vitals  BP   Blood Pressure : 122/72    Temp   36.1 °C (97 °F)    Pulse   Pulse: 76   Resp   19    SpO2   93 %      Anesthesia Post Evaluation    Patient location during evaluation: PACU  Patient participation: complete - patient participated  Level of consciousness: awake and alert  Pain score: 0    Airway patency: patent  Anesthetic complications: no  Cardiovascular status: hemodynamically stable  Respiratory status: acceptable  Hydration status: euvolemic    PONV: none           Nurse Pain Score: 0 (NPRS)

## 2020-11-06 NOTE — ANESTHESIA QCDR
2019 [facility] Clinical Data Registry (for Quality Improvement)     Postoperative nausea/vomiting risk protocol (Adult = 18 yrs and Pediatric 3-17 yrs)- (430 and 463)  General inhalation anesthetic (NOT TIVA) with PONV risk factors: No  Provision of anti-emetic therapy with at least 2 different classes of agents: N/A  Patient DID NOT receive anti-emetic therapy and reason is documented in Medical Record: N/A    Multimodal Pain Management- (477)  Non-emergent surgery AND patient age >= 18: No  Use of Multimodal Pain Management, two or more drugs and/or interventions, NOT including systemic opioids:   Exception: Documented allergy to multiple classes of analgesics:     Smoking Abstinence (404)  Patient is current smoker (cigarette, pipe, e-cig, marijuanna): No  Elective Surgery:   Abstinence instructions provided prior to day of surgery:   Patient abstained from smoking on day of surgery:     Pre-Op Beta-Blocker in Isolated CABG (44)  Isolated CABG AND patient age >= 18: No  Beta-blocker admin within 24 hours of surgical incision:   Exception:of medical reason(s) for not administering beta blocker within 24 hours prior to surgical incision (e.g., not  indicated,other medical reason):     PACU assessment of acute postoperative pain prior to Anesthesia Care End- Applies to Patients Age = 18- (ABG7)  Initial PACU pain score is which of the following: < 7/10  Patient unable to report pain score: N/A    Post-anesthetic transfer of care checklist/protocol to PACU/ICU- (426 and 427)  Upon conclusion of case, patient transferred to which of the following locations: PACU/Non-ICU  Use of transfer checklist/protocol: Yes  Exclusion: Service Performed in Patient Hospital Room (and thus did not require transfer): N/A  Unplanned admission to ICU related to anesthesia service up through end of PACU care- (MD51)  Unplanned admission to ICU (not initially anticipated at anesthesia start time): No

## 2020-11-06 NOTE — THERAPY
Missed Therapy     Patient Name: Isaias Ortiz  Age:  80 y.o., Sex:  male  Medical Record #: [id number]  Today's Date: 11/6/2020    Discussed missed therapy with Maryana        11/06/20 0859   Interdisciplinary Plan of Care Collaboration   Collaboration Comments Order received for OT eval.  Pt is s/p stage 1 C-spine fusion & is on strict bedrest.  Stage II planned for Monday.  Will hold OT eval until cleared for OOB ADL's.

## 2020-11-06 NOTE — ANESTHESIA TIME REPORT
Anesthesia Start and Stop Event Times     Date Time Event    11/5/2020 1659 Ready for Procedure     1708 Anesthesia Start     2022 Anesthesia Stop        Responsible Staff  11/05/20    Name Role Begin End    Damian Jung D.O. Anesth 1708 2022        Preop Diagnosis (Free Text):  Pre-op Diagnosis     3-column C6 fracture        Preop Diagnosis (Codes):    Post op Diagnosis  Cervical spine fracture (HCC)      Premium Reason  B. 1st Call    Comments:

## 2020-11-06 NOTE — PROGRESS NOTES
"TRAUMA TERTIARY SURVEY     Mental status adequate for full examination?: Yes    Spine cleared (radiologically and/or clinically): No    PHYSICAL EXAMINATION:  Vitals: /73   Pulse 77   Temp 36.1 °C (96.9 °F) (Temporal)   Resp 15   Ht 1.727 m (5' 8\")   Wt 97.1 kg (214 lb)   SpO2 91%   BMI 32.54 kg/m²   Constitutional:     General Appearance: appears stated age, is in no apparent distress, is well developed and well nourished.  HEENT:     No significant external craniofacial trauma. The pupils are equal, round, and reactive to light bilaterally. The extraocular muscles are intact bilaterally.. The nares and oropharynx are clear. The midface and jaw are stable. No malocclusion is evident.  Neck:    The cervical spine is immobilized with a hard collar. Anterior cervical incision is dressed.    Respiratory:   Inspection: No stridor or difficulty swallowing Unlabored respirations, no intercostal retractions, paradoxical motion, or accessory muscle use.   Palpation:  The chest is nontender. The clavicles are non deformed bilaterally..   Auscultation: clear to auscultation.  Cardiovascular:   Auscultation: regular rate and rhythm.   Peripheral Pulses: Normal.   Abdomen:   Abdomen is soft, nontender, without organomegaly or masses.  Musculoskeletal:   The pelvis is stable.  No significant angulation, deformity, or soft tissue injury involving the upper and lower extremities. Normal range of motion.   Back:   The thoracolumbar spine was examined. Examination is remarkable for no significant tenderness, swelling, or deformity in the thoracolumbar region.  Skin:   The skin is warm and dry.  Neurologic:    [place] Coma Scale (GCS) 15 [id number]. Neurologic examination revealed no focal deficits noted, mental status intact, muscle strength normal.  Psychiatric:   The patient does not appear depressed or anxious.    IMAGING:  CT-HEAD W/O   Final Result         1.  Stable subarachnoid hemorrhages along the medial vertex of " the right frontal and parietal lobes.   2.  Stable subarachnoid hemorrhage along the inferior margin of the falx on the left centrally.   3.  Atherosclerosis.      DX-CERVICAL SPINE-2 OR 3 VIEWS   Final Result         1.  Intraoperative changes of cervical spine fusion in progress      DX-PORTABLE FLUORO > 1 HOUR   Final Result      Portable fluoroscopy utilized for 24 seconds.         INTERPRETING LOCATION: [address] [place], [place] [address], [id number]      MR-CERVICAL SPINE-W/O   Final Result      1.  There is burst fracture of C6 with body with posterior elements. There are also nondisplaced compression fractures of C7 and T1.   2.  There is discontinuity of anterior and posterior longitudinal ligaments and ligamentum flavum consistent with ligamentous injury.   3.  There is also discontinuity of ligamentum flavum at the level of C1-C2. There is increased T2 signal intensity concerning for ligamentous injury at this level.   4.   There is abnormal T2 hyperintensity in the posterior paraspinal muscles likely representing contusion and edema. There is mild amount of anterior prevertebral hematoma at the levels of C7, T1 and T2.   5.  There is mild amount of posterior lateral epidural hemorrhage noted extending from the skull base to the level of C6. There is anterior epidural hemorrhage at C5 and C6.   6.  At the level of C5-6 and C6-7, there is effacement of the dorsal and ventral subarachnoid spaces secondary to the posterior retropulsion of posterior fracture and epidural hemorrhage. There is severe central canal stenosis at this level. There is no    abnormal intramedullary T2 signal intensity at this level.   7.  Moderate central canal stenosis at the levels of C2-3, C3-4 and C4-5 secondary to the posterior lateral epidural hemorrhage.   8.  There is nonvisualization of the flow void of the right vertebral artery. This is uncertain whether this is a chronic finding or secondary to the vertebral dissection. The left vertebral  artery flow-void is widely patent.   9.  There is an approximately 9 mm left thyroid nodule.      OUTSIDE IMAGES-CT HEAD   Final Result      OUTSIDE IMAGES-CT CERVICAL SPINE   Final Result      DX-CHEST-LIMITED (1 VIEW)   Final Result      1.  No acute cardiac or pulmonary abnormalities are identified.   2.  There appears to be a proximal left humerus fracture. Correlation with physical exam is recommended.      EC-ECHOCARDIOGRAM COMPLETE W/ CONT    (Results Pending)     All current laboratory studies/radiology exams reviewed: Yes    Completed Consultations:  Neurosurgery      Pending Consultations:  None    Newly Identified Diagnoses and Injuries:  Right vertebral artery injury.   CXR with possible proximal left humerus fracture.  (no pain on exam)    TOTAL RAP SCORE:  RAP Score Total: 9      ETOH Screening  CAGE Score: 0  Assessment complete date: 11/6/2020 (ODALYS negative. No intervention warrented)

## 2020-11-06 NOTE — PROGRESS NOTES
"One transporter arrived at bedside with wheelchair to transport patient to X-ray and then CT. Patient ambulated with standby assist from chair wheelchair. Patient A+O x 4 and understands indications for X-ray and CT.    /71   Pulse 79   Temp 36.1 °C (97 °F) (Temporal)   Resp 17   Ht 1.727 m (5' 8\")   Wt 99.2 kg (218 lb 11.1 oz)   SpO2 92%    "

## 2020-11-06 NOTE — OR SURGEON
Immediate Post OP Note    PreOp Diagnosis: C6 fracture    PostOp Diagnosis: same    Procedure(s):  DISCECTOMY, SPINE, CERVICAL, ANTERIOR APPROACH, WITH FUSION C5-7 - Wound Class: Clean with Drain  CORPECTOMY, SPINE C6 - Wound Class: Clean with Drain    Surgeon(s):  Phoenix Alvarez III, M.D.    Anesthesiologist/Type of Anesthesia:  Anesthesiologist: Damian Jung D.O./General    Surgical Staff:  Assistant: Mikael Reynoso P.A.-C.  Circulator: Damian Jacobs R.N.  Relief Circulator: Lupis Santa R.N.  Relief Scrub: Neeraj Rick  Scrub Person: Brenda Good  Radiology Technologist: Jg Reynoso    Specimens removed if any:  * No specimens in log *    Estimated Blood Loss: 400cc    Findings: fracture C6    Complications: None. Neuro monitoring stable.         11/5/2020 8:22 PM Mikael Reynoso P.A.-C.

## 2020-11-06 NOTE — ANESTHESIA PROCEDURE NOTES
Arterial Line  Performed by: Damian Jung D.O.  Authorized by: Damian Jung D.O.     Start Time:  11/5/2020 5:22 PM  End Time:  11/5/2020 5:26 PM  Localization: ultrasound guidance and surface landmarks    Patient Location:  OR  Indication: continuous blood pressure monitoring        Catheter Size:  20 G  Seldinger Technique?: Yes    Laterality:  Right  Site:  Radial artery  Line Secured:  Antimicrobial disc, tape and transparent dressing  Events: patient tolerated procedure well with no complications

## 2020-11-06 NOTE — PROGRESS NOTES
Saw patient in PACU  Noted admit TEG AA 94.7  Repeat head CT not completed due to patient being in OR   Repeat head CT pending and RN to call to reschedule  Repeat TEG post op

## 2020-11-06 NOTE — ANESTHESIA PREPROCEDURE EVALUATION
Relevant Problems   CARDIAC   (+) Hypertension      ENDO   (+) DM2 (diabetes mellitus, type 2) (HCC)      Other   (+) C6 cervical fracture (HCC)   (+) Subdural hemorrhage (HCC)       Physical Exam    Airway   Mallampati: III  TM distance: <3 FB  Neck ROM: limited       Cardiovascular - normal exam  Rhythm: regular  Rate: normal  (-) murmur     Dental - normal exam           Pulmonary - normal exam  Breath sounds clear to auscultation     Abdominal    Neurological - normal exam                 Anesthesia Plan    ASA 2- EMERGENT   ASA physical status emergent criteria: displaced fracture with possible neurovascular compromise    Plan - general       Airway plan will be ETT        Induction: intravenous    Postoperative Plan: Postoperative administration of opioids is intended.    Pertinent diagnostic labs and testing reviewed    Informed Consent:    Anesthetic plan and risks discussed with patient.    Use of blood products discussed with: patient whom consented to blood products.

## 2020-11-06 NOTE — PROGRESS NOTES
Received from ED via cindy arnold MAE while in bed, aspen collar on, POC discussed, to preop via bed accompanied by wife and transport, CHG bath done.

## 2020-11-06 NOTE — H&P
Trauma Surgery History and Physical    Chief Complaint: Ground-level fall    History of Present Illness: The patient is a 80-year-old man who sustained a ground-level fall.  He apparently tripped over something in a parking lot and fell and struck his head.  He did not lose consciousness but did experience pain in his head and neck.  He was seen at an outside facility and noted to have a traumatic brain injury as well as a C-spine fracture.  He was transferred here for further pain.  He does complain of neck and head pain.  He denies weakness, tingling, or numbness.  He also denies any abdominal pain.    Triage Category: The patient was triaged as a Trauma Green Transfer activation. The patient was initially evaluated at [facility] in [place], [place] where The above injuries were noted. The patient was transported to [facility] in [place], [place] for a definitive neurotrauma evaluation. An expeditious primary and secondary survey with required adjuncts was conducted. See Trauma Narrator for full details.    Past Medical History:  has a past medical history of Chronic obstructive pulmonary disease (HCC), Diabetes ([facility]), High cholesterol, Hypertension, and Prostate cancer ([facility]).    Past Surgical History:  has a past surgical history that includes cyst excision.    Allergies:   Allergies   Allergen Reactions   • Codeine Anaphylaxis   • Opium Anaphylaxis       Current Medications:    Home Medications     Reviewed by Sindy Serrano R.N. (Registered Nurse) on 11/05/20 at 1707  Med List Status: Complete   Medication Last Dose Status   albuterol 108 (90 Base) MCG/ACT Aero Soln inhalation aerosol 11/2/2020 Active   Calcium 600 MG Tab 11/5/2020 Active   Cholecalciferol (VITAMIN D3 PO) 11/5/2020 Active   JANUVIA 100 MG Tab 11/5/2020 Active   lovastatin (MEVACOR) 40 MG tablet 11/5/2020 Active   metFORMIN ER 1000 MG TABLET SR 24 HR 11/5/2020 Active   Multiple Vitamin (MULTIVITAMIN PO)  "11/5/2020 Active   TRELEGY ELLIPTA 100-62.5-25 MCG/INH AEROSOL POWDER, BREATH ACTIVATED 11/5/2020 Active                Family History: family history is not on file.    Social History:  reports that he has quit smoking. He has never used smokeless tobacco. He reports current alcohol use. He reports that he does not use drugs.    Review of Systems: Comprehensive review of systems is negative with the exception of the aforementioned HPI, PMH, and PSH bullets in accordance with CMS guidelines.    Physical Examination:     Constitutional:     Vital Signs: /78   Pulse 82   Temp 36.7 °C (98 °F) (Temporal)   Resp 14   Ht 1.727 m (5' 8\")   Wt 97.1 kg (214 lb)   SpO2 (!) 87%    General Appearance: The patient is a cooperative elderly man in no critical distress.  HEENT:    No significant external craniofacial trauma. The pupils are equal, round, and reactive to light bilaterally. The extraocular muscles are intact bilaterally. The ear canals and tympanic membranes are clear bilaterally. The nares and oropharynx are clear. The midface and jaw are stable.  No malocclusion is evident.  Neck:    The cervical spine is immobilized with a hard collar.  Respiratory:   Inspection: Unlabored respirations, no intercostal retractions, paradoxical motion, or accessory muscle use.   Palpation:  The chest is nontender. The clavicles are non deformed bilaterally.   Auscultation: clear to auscultation.  Cardiovascular:   Inspection: The skin is warm.  Auscultation: Regular rate and rhythm.   Peripheral Pulses: Normal.   Abdomen:   Inspection: Abdominal inspection reveals no abrasions, contusions, lacerations or penetrating wounds.   Palpation: Palpation is remarkable for no significant tenderness, guarding, or peritoneal findings.  Musculoskeletal:   The pelvis is stable. No significant angulation, deformity, or soft tissue injury involving the upper and lower extremities.  Back:   The thoracolumbar spine was examined utilizing " spinal motion restriction. Examination is remarkable for no significant tenderness, swelling, or deformity in the thoracolumbar region.  Skin:    Examination of the skin reveals no significant abrasions, contusion, lacerations, or other soft tissue injury.  Neurologic:    [place] Coma Scale (GCS) 15.    Neurologic examination reveals no focal deficits noted.  Psychiatric:   The patient does not appear depressed or anxious.    Laboratory Values:   Recent Labs     11/05/20  1231   WBC 15.5*   RBC 4.97   HEMOGLOBIN 15.0   HEMATOCRIT 44.1   MCV 88.7   MCH 30.2   MCHC 34.0   RDW 44.7   PLATELETCT 206   MPV 10.5     Recent Labs     11/05/20  1231   SODIUM 139   POTASSIUM 4.7   CHLORIDE 102   CO2 22   GLUCOSE 145*   BUN 23*   CREATININE 1.05   CALCIUM 10.4     Recent Labs     11/05/20  1231   ASTSGOT 41   ALTSGPT 38   TBILIRUBIN 0.4   ALKPHOSPHAT 53   GLOBULIN 2.8   INR 1.02     Recent Labs     11/05/20  1231   APTT 26.5   INR 1.02        Imaging:   MR-CERVICAL SPINE-W/O   Final Result      1.  There is burst fracture of C6 with body with posterior elements. There are also nondisplaced compression fractures of C7 and T1.   2.  There is discontinuity of anterior and posterior longitudinal ligaments and ligamentum flavum consistent with ligamentous injury.   3.  There is also discontinuity of ligamentum flavum at the level of C1-C2. There is increased T2 signal intensity concerning for ligamentous injury at this level.   4.   There is abnormal T2 hyperintensity in the posterior paraspinal muscles likely representing contusion and edema. There is mild amount of anterior prevertebral hematoma at the levels of C7, T1 and T2.   5.  There is mild amount of posterior lateral epidural hemorrhage noted extending from the skull base to the level of C6. There is anterior epidural hemorrhage at C5 and C6.   6.  At the level of C5-6 and C6-7, there is effacement of the dorsal and ventral subarachnoid spaces secondary to the posterior  retropulsion of posterior fracture and epidural hemorrhage. There is severe central canal stenosis at this level. There is no    abnormal intramedullary T2 signal intensity at this level.   7.  Moderate central canal stenosis at the levels of C2-3, C3-4 and C4-5 secondary to the posterior lateral epidural hemorrhage.   8.  There is nonvisualization of the flow void of the right vertebral artery. This is uncertain whether this is a chronic finding or secondary to the vertebral dissection. The left vertebral artery flow-void is widely patent.   9.  There is an approximately 9 mm left thyroid nodule.      OUTSIDE IMAGES-CT HEAD   Final Result      OUTSIDE IMAGES-CT CERVICAL SPINE   Final Result      DX-CHEST-LIMITED (1 VIEW)   Final Result      1.  No acute cardiac or pulmonary abnormalities are identified.   2.  There appears to be a proximal left humerus fracture. Correlation with physical exam is recommended.      EC-ECHOCARDIOGRAM COMPLETE W/ CONT    (Results Pending)   CT-HEAD W/O    (Results Pending)   DX-CERVICAL SPINE-2 OR 3 VIEWS    (Results Pending)   DX-PORTABLE FLUORO > 1 HOUR    (Results Pending)       Problems:    Contraindication to deep vein thrombosis (DVT) prophylaxis  Systemic anticoagulation contraindicated secondary to elevated bleeding risk.  Consider surveillance venous duplex scanning if unable to initiate chemical DVT prophylaxis within 48 hrs of admission.    Screening examination for infectious disease  11/5 COVID-19 specimen sent.    Trauma  GLF.  Trauma Green Transfer Activation.  Jitendra Lee MD. Trauma Surgery.    Subdural hemorrhage (HCC)  Subdural blood tracking along the falx without significant adjacent mass effect.  Repeat interval head CT  TEG with platelet mapping pending  Definitive plan pending.  Post traumatic pharmacologic seizure prophylaxis for 1 week.  Speech Language Pathology cognitive evaluation.  Phoenix Alvarez MD. Neurosurgeon. [facility].    C6 cervical  fracture (HCC)  C6 vertebral body fracture with 6mm of retropulsion posteriorly resulting in severe spinal canal narrowing with involvement of pedicles with posterior displacement of the vertebral body.  MRI cervical spine pending  Laverne collar.  Definitive plan pending.  Cspine log roll until cleared by neurosurgery  Phoenix Alvarez MD. Neurosurgeon. [facility].    DM2 (diabetes mellitus, type 2) (HCC)  Chronic condition treated with Metformin.  Holding maintenance metformin for 48 hours following intravenous contrast administration.  Insulin sliding scale coverage.    Hypertension  Chronic condition.  Definitive medication reconciliation pending.  Assessment and plan:  80-year-old man status post a ground-level fall after tripping over something in a parking lot.  He does have injuries includin.  C6 cervical fracture-currently in an Laverne collar.  Definitive plan is pending.  Dr. Alvarez is consulting  2.  Subdural hemorrhage-no mass-effect.  Baseline of neurologic functioning.  Dr. Alvarez has recommended initial admission to the floor rather than the ICU.  He will be consulting and making definitive recommendations for treatment.    Disposition: [facility].  Trauma tertiary survey.    Critical Care Time: 32 minutes excluding procedures.       ____________________________________     Jitendra Lee M.D.    DD: 2020  6:08 PM

## 2020-11-06 NOTE — PROGRESS NOTES
2 RN Skin Check    2 RN skin check complete with Trisha ORTEGA.   Devices in place: SCDs and Nasal Cannula.  Skin assessed under devices: yes.  Confirmed pressure ulcers found on: n/a.  New potential pressure ulcers noted on n/a. Wound consult placed No.  The following interventions in place Pillows and Mepilex.    Patient has Aspen collar. Dressing over surgical incision clean/dry/intact. Redness and small scab on back of head. Skin tear on left elbow with mepilex in place.

## 2020-11-06 NOTE — ASSESSMENT & PLAN NOTE
CXR imaging with possible proximal left humerus fracture  11/6 Dedicated diagnostic imaging medial epicondyles lucency which may represent fracture versus unfused apophysis.    11/8 Distal humeral enthesophyte. No acute fracture or injury.WBAT  Weight bearing status - Weightbearing as tolerated OSCAR Roque MD. Orthopedic Surgeon. [facility].

## 2020-11-06 NOTE — ASSESSMENT & PLAN NOTE
11/5 EKG: normal sinus rhythm   11/6 ECHO cardiogram with normal left ventricular systolic function. Ejection fraction is visually estimated to be 60%.

## 2020-11-06 NOTE — OR NURSING
2019 Pt from OR, asleep, with oral airway and O2 support of 10 L/min via mask, respirations regular and spontaneous, vital signs within normal limits. Neck with [place] J-collar, post op site at anterior neck, dressing CDI with hemovac drain, secured, noted scant serosang output at the tubing. Pt with 2 IV site, dressings CDI, patent. Pt with mata attached to urine bag, drained by gravity, noted clear yellow output at the bag. SCDs ON, bed set to lowest point and secured.    2020 POC done, 222 mg/dl, anesthesia aware and no orders made.    2035 Pt awake, dc'd oral airway.    2040 Dr. Alvarez at bedside, neuro vital signs, within normal limits.    2045 Pt denies pain and nausea.     2050 Gladys (Spouse) updated thru call.    2100 Neuro v/s done, within normal limits.    2106 Gave report to Sulema ORTEGA.    2110 Shaniqua (APRN) at bedside, orders to follow up Head CT-scan and platelet TEG.    2115 Called Gustavo (CT-scan tech) to schedule Head CT, may go anytime to main CT-scan.    2225 Able to reach phlebotomist regarding Platelet TEG.    2236 Pt to CT scan with ACLS RN and Transport, O2 support of 6 L/min via oxymask (O2 tank at full level).    2240 Pt at CT-scan.    2246 Pt to room with ACLS RN and transport, O2 support still at 6 L/arie via oxymask.    2250 Pt at room T305-1, this RN gave additional report to Sulema ORTEGA regarding CT-scan and blood draw for platelet TEG.

## 2020-11-06 NOTE — DISCHARGE PLANNING
Anticipated Discharge Disposition: TBD    Action: per chart review, pt pending stage 2 surgery Sunday.    Barriers to Discharge: pending surgery, post op PT/OT recs    Plan: f/u with medical team, pt

## 2020-11-06 NOTE — OP REPORT
DATE OF SERVICE:  11/05/2020    PREOPERATIVE DIAGNOSES:  1. Cervical spondylosis.  2. Cervical stenosis.  3. Closed cervical C6 fracture.  4. Cervical spondylolisthesis.  5. Cervical stenosis.  6. Diffuse idiopathic skeletal hypertrophy, cervical.    POSTOPERATIVE DIAGNOSES:  1. Cervical spondylosis.  2. Cervical stenosis.  3. Closed cervical C6 fracture.  4. Cervical spondylolisthesis.  5. Cervical stenosis.  6. Diffuse idiopathic skeletal hypertrophy, cervical.    PROCEDURES PERFORMED:  1. C6 anterior corpectomy, decompression of thecal sac.  2. C6 titanium with expandable cage placement.  3. C5, C6, C7 interbody/allograft fusion.  4. C5, C7 anterior plating fixation.  5. Microscopic dissection.  6. Intraoperative monitoring.    SURGEON:  Phoenix Alvraez MD    ASSISTANT:  Mikael Reynoso PA-C    ANESTHESIA:  General.    ESTIMATED BLOOD LOSS:  500 mL.    FINDINGS:  Reasonable bony fixation.  No over distraction, still has residual   spondylolisthesis, will need posterior supplementation.    COMPLICATIONS:  None.    DRAINS LEFT:  Subplatysmal Hemovac.    DISPOSITION:  Extubated to recovery and to floor.    HISTORY OF PRESENT ILLNESS:  This is a 80-year-old man who suffered a ground   level fall and although he is neurologically intact, a dramatic C6 burst   fracture just below at the bottom level of C2-C6 autofusion (DISH) diffuse   idiopathic skeletal hypertrophy.  He had critical stenosis on the MRI.  I   explained the risks, benefits, and alternatives of a two staged procedure,   first of which being C6 corpectomy to provide thecal sac decompression.  This   includes pain, infection, bleeding, CSF leak, failure to completely resolve   symptoms, neurologic deficit, including weakness, numbness, bladder or bowel difficulties, failure of   fixation, failure of fusion, need for rostral and caudal extensions due to   junctional stenosis, injury to trachea, carotid and esophagus, temporary or   permanent speaking and  swallowing difficulties as well as need for posterior   supplementation.  He understood the risks, benefits, and agreed to consent.    SUMMARY OF OPERATIVE PROCEDURE:  The patient was brought to the operating   suite and placed under general anesthesia.  Cruz catheter was placed, lines   secured.  Motors and SSEPs were run at baseline in the supine position.  We   did recurrent laryngeal nerve monitoring and there were some drops in the   motors, but that were technical, SSEPs never change at the very, very end of   the case.  No recurrent laryngeal nerve firing during the case.  Once the   baselines were achieved, MAPs were kept greater than 80 during the case and we   placed in just slight extension.  X-ray fluoroscopy showed reasonable   alignment and of course there was the bilateral jumped facets.  Repeat motors   and SSEPs were no changes.  Preoperative antibiotics were given.  Proper   time-out was performed.  The patient was prepped and draped in sterile fashion   after using x-ray to rachna out an anterior transverse incision over the C6   corpus.    A linear incision was made and soft tissues dissected with monopolar   electrocautery.  We found the platysma, incised it sharply, did subplatysmal   dissection, using standard blunt techniques, we found the omohyoid, resected   it, brought the carotid sheath laterally, trachea and esophagus medially.    There was no recurrent laryngeal nerve firing on the approach.  We found the   diffuse idiopathic skeletal hypertrophy and isolated the level, put [place]   pins in C5 and C7.  We then slowly drilled off the DISH anteriorly.  There   were lots of fracture fragments came along with and lots of bleeding from all   over as the fracture was complex and we lost about 500 mL of blood overall.    Once we got down to normal bone at C5 and C7, we then started working our   corpectomy.    C6 corpectomy:  Using a Hensler bone sucker and a barrel bit, we performed a    diskectomy above and below.  The disk was completely destroyed and liquified   from the trauma, nearly  from the trauma.  We then used a barrel bit   drill to drill off the bone with C6 and very careful once we get down and   lower to use just AMA bit to prevent any movement into the very tight spinal   cord.  We harvested all that for autograft later.  We brought the microscope   in for increased visualization.  We slowly elevated the bone and resected all   the bone, had excellent decompression of the thecal sac, we resected the   remaining disk.  We trimmed the endplates down flat without violating the   endplates.  We used a lot of Surgiflo and bone wax.  There was a lot of   bleeding from all the broken bone.  The fractures extended laterally.  We did   not want to disturb the inferior vertebral artery injury.  Once we got   reasonable hemostasis, we tried to elevate the PLL, but it was so bruised that   we just decided we had excellent decompression, we did not need to get there.    We did not need to elevate that as we had thecal sac decompression and good   Motors and SSEPs signals.    C6 expandable cage placement:  Using the Galt corpectomy cage, we trialed   out a 14 x 12 x 22-34 mm expandable 5-degree lordotic, 2.5 above, 2.5 below   lordotic cage.  This had a nice fit once [place] pin distraction was released.    It was placed under fluoroscopic guidance.  There was no change in motors and   SSEPs.    C5, C6, C7 interbody/allograft fusion:  Before placing the graft, this was packed tightly   with morselized corpectomy bone because of tight apposition endplates, this   will help assure fusion across the span of the missing bone.    C5, C6, C7 anterior plating fixation:  Using appropriately sized Zavation   corpectomy plate just without too much rostral and caudal overhang, we drilled   a little bit of the DISH bony hypertrophy on C5 and so the plate would lie   flush.  We drilled parallel to  the endplates, and placed 4.0 x 60 mm screws   with good bony purchase.  We tightened    Down the special locking nuts with   screwdriver to prevent screws from backing out.  We were happy with our construct.    We copiously irrigated with bacitracin saline.  Final motors and SSEPs were   normal, although the motors initially had an issue after corpectomy, so we waited little bit   longer to make sure the SSEPs did not change and then motors ( so again more   a monitoring technical issue) returned.  We slowly withdrew the retractor, assured   hemostasis, tunneled out subplatysmal Hemovac, secured to skin with stitch.    We closed the wound in anatomic layers with 3-0 Vicryl for the platysma, 3-0   Vicryl for the dermis, and Steri-Strips for the skin.  Sterile dressing was   Applied. Malcolm collar was placed and a Cruz removed and he was extubated.    There were no complications.  Needle and sponge count correct at the end of   the case.  He is going to be 45 degrees maximum until stage II, which will be   Monday.             ____________________________________     MD KATHRYN Alvarado III / FATOUMATA    DD:  11/05/2020 21:04:45  DT:  11/05/2020 22:42:41    D#:  [id number]  Job#:  [id number]

## 2020-11-06 NOTE — PROGRESS NOTES
Trauma / Surgical Daily Progress Note    Date of Service  11/6/2020    Chief Complaint  80 y.o. male admitted 11/5/2020 with C6 fracture    Interval Events    PO day # 1 C6 corpectomy with anterior cervical fusion C5-T1  No focal deficits   Tertiary exam completed . MRI with right vertebral artery injury. CXR with possible left humerus fracture.    - Vertebral artery injury reviewed with Sona Jha PA-C neurosurgery.  - Dedicated diagnostic imaging of left humerus ordered.  - Scheduled for Stage II C5-T1 posterior fusion on 11/9.  - Counseled.     Review of Systems  Review of Systems   Constitutional: Negative for chills and fever.   HENT: Negative for hearing loss.         No difficulty swallowing    Eyes: Negative for double vision.   Respiratory: Negative for shortness of breath and stridor.    Cardiovascular: Negative for chest pain.   Gastrointestinal: Negative for abdominal pain, nausea and vomiting.   Genitourinary: Negative for dysuria.   Musculoskeletal: Positive for myalgias and neck pain. Negative for back pain and joint pain.   Neurological: Negative for sensory change, speech change and focal weakness.        Vital Signs  Temp:  [35.8 °C (96.5 °F)-36.8 °C (98.3 °F)] 36.1 °C (96.9 °F)  Pulse:  [68-91] 77  Resp:  [13-45] 15  BP: (101-190)/(55-87) 122/73  SpO2:  [87 %-97 %] 91 %    Physical Exam  Physical Exam  Vitals signs and nursing note reviewed.   Constitutional:       General: He is awake. He is not in acute distress.     Appearance: He is not ill-appearing, toxic-appearing or diaphoretic.      Interventions: Cervical collar and nasal cannula in place.   HENT:      Head: Normocephalic.      Mouth/Throat:      Mouth: Mucous membranes are moist.      Pharynx: Oropharynx is clear.   Eyes:      Extraocular Movements: Extraocular movements intact.      Conjunctiva/sclera: Conjunctivae normal.   Neck:      Musculoskeletal: Muscular tenderness present.      Comments: Anterior cervical incision  dressed.  No hematoma or drainage.    Cardiovascular:      Rate and Rhythm: Normal rate and regular rhythm.      Pulses: Normal pulses.   Pulmonary:      Effort: Pulmonary effort is normal.      Breath sounds: Normal breath sounds.      Comments: No stridor  Chest:      Chest wall: No tenderness.   Abdominal:      General: There is no distension.      Tenderness: There is no abdominal tenderness. There is no guarding.   Musculoskeletal:      Comments: Moves all extremities    Skin:     General: Skin is warm and dry.      Capillary Refill: Capillary refill takes less than 2 seconds.      Findings: No rash.   Neurological:      General: No focal deficit present.      Mental Status: He is alert.      GCS: GCS eye subscore is 4. GCS verbal subscore is 5. GCS motor subscore is 6.      Cranial Nerves: No cranial nerve deficit.      Sensory: No sensory deficit.   Psychiatric:         Mood and Affect: Mood normal.         Behavior: Behavior normal. Behavior is cooperative.         Thought Content: Thought content normal.         Judgment: Judgment normal.         Laboratory  Recent Results (from the past 24 hour(s))   PLATELET MAPPING WITH BASIC TEG    Collection Time: 11/05/20 12:31 PM   Result Value Ref Range    Reaction Time Initial-R 4.7 (L) 5.0 - 10.0 min    Clot Kinetics-K 2.2 1.0 - 3.0 min    Clot Angle-Angle 60.9 53.0 - 72.0 degrees    Maximum Clot Strength-MA 65.7 50.0 - 70.0 mm    Lysis 30 minutes-LY30 0.0 0.0 - 8.0 %    % Inhibition ADP 53.4 %    % Inhibition AA 94.7 %    TEG Algorithm Link Algorithm    COD - Adult (Type and Screen)    Collection Time: 11/05/20 12:31 PM   Result Value Ref Range    ABO Grouping Only A     Rh Grouping Only POS     Antibody Screen-Cod NEG    DIAGNOSTIC ALCOHOL    Collection Time: 11/05/20 12:31 PM   Result Value Ref Range    Diagnostic Alcohol <10.1 0.0 - 10.0 mg/dL   Comp Metabolic Panel    Collection Time: 11/05/20 12:31 PM   Result Value Ref Range    Sodium 139 135 - 145 mmol/L     Potassium 4.7 3.6 - 5.5 mmol/L    Chloride 102 96 - 112 mmol/L    Co2 22 20 - 33 mmol/L    Anion Gap 15.0 7.0 - 16.0    Glucose 145 (H) 65 - 99 mg/dL    Bun 23 (H) 8 - 22 mg/dL    Creatinine 1.05 0.50 - 1.40 mg/dL    Calcium 10.4 8.5 - 10.5 mg/dL    AST(SGOT) 41 12 - 45 U/L    ALT(SGPT) 38 2 - 50 U/L    Alkaline Phosphatase 53 30 - 99 U/L    Total Bilirubin 0.4 0.1 - 1.5 mg/dL    Albumin 4.5 3.2 - 4.9 g/dL    Total Protein 7.3 6.0 - 8.2 g/dL    Globulin 2.8 1.9 - 3.5 g/dL    A-G Ratio 1.6 g/dL   CBC WITHOUT DIFFERENTIAL    Collection Time: 11/05/20 12:31 PM   Result Value Ref Range    WBC 15.5 (H) 4.8 - 10.8 K/uL    RBC 4.97 4.70 - 6.10 M/uL    Hemoglobin 15.0 14.0 - 18.0 g/dL    Hematocrit 44.1 42.0 - 52.0 %    MCV 88.7 81.4 - 97.8 fL    MCH 30.2 27.0 - 33.0 pg    MCHC 34.0 33.7 - 35.3 g/dL    RDW 44.7 35.9 - 50.0 fL    Platelet Count 206 164 - 446 K/uL    MPV 10.5 9.0 - 12.9 fL   Prothrombin Time    Collection Time: 11/05/20 12:31 PM   Result Value Ref Range    PT 13.7 12.0 - 14.6 sec    INR 1.02 0.87 - 1.13   APTT    Collection Time: 11/05/20 12:31 PM   Result Value Ref Range    APTT 26.5 24.7 - 36.0 sec   ABO Rh Confirm    Collection Time: 11/05/20 12:31 PM   Result Value Ref Range    ABO Rh Confirm A POS    ESTIMATED GFR    Collection Time: 11/05/20 12:31 PM   Result Value Ref Range    GFR If African American >60 >60 mL/min/1.73 m 2    GFR If Non African American >60 >60 mL/min/1.73 m 2   COVID/SARS CoV-2 PCR    Collection Time: 11/05/20  1:24 PM    Specimen: Nasopharyngeal; Respirate   Result Value Ref Range    COVID Order Status Received    SARS-CoV-2, PCR (In-House)    Collection Time: 11/05/20  1:24 PM   Result Value Ref Range    SARS-CoV-2 Source NP Swab     SARS-CoV-2 by PCR NotDetected    EKG    Collection Time: 11/05/20  2:21 PM   Result Value Ref Range    Report       [facility] Emergency Dept.    Test Date:  2020-11-05  Pt Name:    OCTAVIA RIVERA                   Department:  ER  MRN:        [id number]                      Room:        18  Gender:     Male                         Technician: SHIRA  :        [id number]                   Requested By:XAVIER MORE  Order #:    [id number]                    Reading MD:    Measurements  Intervals                                Axis  Rate:       83                           P:          28  MI:         148                          QRS:        -58  QRSD:       90                           T:          75  QT:         372  QTc:        437    Interpretive Statements  SINUS RHYTHM  LAD, CONSIDER LEFT ANTERIOR FASCICULAR BLOCK  BORDERLINE T WAVE ABNORMALITIES  No previous ECG available for comparison     ACCU-CHEK GLUCOSE    Collection Time: 20  8:21 PM   Result Value Ref Range    Glucose - Accu-Ck 222 (H) 65 - 99 mg/dL   ACCU-CHEK GLUCOSE    Collection Time: 20 10:08 PM   Result Value Ref Range    Glucose - Accu-Ck 236 (H) 65 - 99 mg/dL   PLATELET MAPPING WITH BASIC TEG    Collection Time: 20 10:56 PM   Result Value Ref Range    Reaction Time Initial-R 6.2 5.0 - 10.0 min    Clot Kinetics-K 1.9 1.0 - 3.0 min    Clot Angle-Angle 64.1 53.0 - 72.0 degrees    Maximum Clot Strength-MA 68.4 50.0 - 70.0 mm    Lysis 30 minutes-LY30 0.0 0.0 - 8.0 %    % Inhibition ADP 34.9 %    % Inhibition AA 72.4 %    TEG Algorithm Link Algorithm    BASIC TEG W HEPARINASE    Collection Time: 20 10:56 PM   Result Value Ref Range    React Time Initial Hep 5.8 5.0 - 10.0 min    Clot Kinetics Heparinase 1.4 1.0 - 3.0 min    Clot Angle Heparinase 70.4 53.0 - 72.0 degrees    Max Clot Heparinase 70.6 (H) 50.0 - 70.0 mm    Lysis 30 min Heparinase 0.0 0.0 - 8.0 %   CBC with Differential: Tomorrow AM    Collection Time: 20  3:59 AM   Result Value Ref Range    WBC 10.3 4.8 - 10.8 K/uL    RBC 4.57 (L) 4.70 - 6.10 M/uL    Hemoglobin 13.3 (L) 14.0 - 18.0 g/dL    Hematocrit 40.3 (L) 42.0 - 52.0 %    MCV 88.2 81.4 - 97.8 fL    MCH 29.1 27.0 - 33.0 pg    MCHC  33.0 (L) 33.7 - 35.3 g/dL    RDW 44.4 35.9 - 50.0 fL    Platelet Count 199 164 - 446 K/uL    MPV 11.1 9.0 - 12.9 fL    Neutrophils-Polys 86.80 (H) 44.00 - 72.00 %    Lymphocytes 8.20 (L) 22.00 - 41.00 %    Monocytes 4.20 0.00 - 13.40 %    Eosinophils 0.00 0.00 - 6.90 %    Basophils 0.10 0.00 - 1.80 %    Immature Granulocytes 0.70 0.00 - 0.90 %    Nucleated RBC 0.00 /100 WBC    Neutrophils (Absolute) 8.91 (H) 1.82 - 7.42 K/uL    Lymphs (Absolute) 0.84 (L) 1.00 - 4.80 K/uL    Monos (Absolute) 0.43 0.00 - 0.85 K/uL    Eos (Absolute) 0.00 0.00 - 0.51 K/uL    Baso (Absolute) 0.01 0.00 - 0.12 K/uL    Immature Granulocytes (abs) 0.07 0.00 - 0.11 K/uL    NRBC (Absolute) 0.00 K/uL   Comp Metabolic Panel (CMP): Tomorrow AM    Collection Time: 11/06/20  3:59 AM   Result Value Ref Range    Sodium 138 135 - 145 mmol/L    Potassium 4.2 3.6 - 5.5 mmol/L    Chloride 100 96 - 112 mmol/L    Co2 22 20 - 33 mmol/L    Anion Gap 16.0 7.0 - 16.0    Glucose 215 (H) 65 - 99 mg/dL    Bun 21 8 - 22 mg/dL    Creatinine 1.08 0.50 - 1.40 mg/dL    Calcium 9.2 8.5 - 10.5 mg/dL    AST(SGOT) 45 12 - 45 U/L    ALT(SGPT) 33 2 - 50 U/L    Alkaline Phosphatase 45 30 - 99 U/L    Total Bilirubin 0.4 0.1 - 1.5 mg/dL    Albumin 3.9 3.2 - 4.9 g/dL    Total Protein 6.5 6.0 - 8.2 g/dL    Globulin 2.6 1.9 - 3.5 g/dL    A-G Ratio 1.5 g/dL   ESTIMATED GFR    Collection Time: 11/06/20  3:59 AM   Result Value Ref Range    GFR If African American >60 >60 mL/min/1.73 m 2    GFR If Non African American >60 >60 mL/min/1.73 m 2   ACCU-CHEK GLUCOSE    Collection Time: 11/06/20  7:59 AM   Result Value Ref Range    Glucose - Accu-Ck 184 (H) 65 - 99 mg/dL       Fluids    Intake/Output Summary (Last 24 hours) at 11/6/2020 1047  Last data filed at 11/6/2020 0500  Gross per 24 hour   Intake 1400 ml   Output 2110 ml   Net -710 ml       Core Measures & Quality Metrics  Labs reviewed, EKG reviewed, Medications reviewed and Radiology images reviewed  Cruz catheter: No  Cruz      DVT Prophylaxis: Heparin  DVT prophylaxis - mechanical: SCDs  Ulcer prophylaxis: Not indicated  Antibiotics: Treating active infection/contamination beyond 24 hours perioperative coverage  Assessed for rehab: Patient returned to prior level of function, rehabilitation not indicated at this time    RAP Score Total: 9    ETOH Screening  CAGE Score: 0  Assessment complete date: 11/6/2020 (ODALYS negative. No intervention warrented)        Assessment/Plan  Traumatic closed nondisplaced fracture of proximal end of left humerus, initial encounter- (present on admission)  Assessment & Plan  CXR imaging with possible proximal left humerus fracture  11/6 Dedicated diagnostic imaging pending.     Injury of vertebral artery, right, initial encounter- (present on admission)  Assessment & Plan  There is nonvisualization of the flow void of the right vertebral artery. This is uncertain whether this is a chronic finding or secondary to the vertebral dissection.   Non-operative management.  Phoenix Alvarez MD. Neurosurgeon. [facility].    C6 cervical fracture (HCC)- (present on admission)  Assessment & Plan  C6 vertebral body fracture with 6mm of retropulsion posteriorly resulting in severe spinal canal narrowing with involvement of pedicles with posterior displacement of the vertebral body.  MRI - with burst fracture of C6 with body with posterior elements.   - Nondisplaced compression fractures of C7 and T1.  - Discontinuity of anterior and posterior longitudinal ligaments & ligamentum flavum consistent with injury.  - Discontinuity of ligamentum flavum at the level of C1-C2.   - There is increased T2 signal intensity concerning for ligamentous injury at this level.  - Abnormal T2 hyperintensity in the posterior paraspinal muscles likely representing contusion and edema.   - Mild amount of anterior prevertebral hematoma at the levels of C7, T1 and T2.  - Mild amount of posterior lateral epidural hemorrhage noted  extending from the skull base to the level of C6.  - There is anterior epidural hemorrhage at C5 and C6.  - Level of C5-6 and C6-7, there is effacement of the dorsal and ventral subarachnoid spaces secondary to the posterior retropulsion of posterior fracture and epidural hemorrhage. Severe central canal stenosis at this level.   -  Moderate central canal stenosis at the levels of C2-3, C3-4 and C4-5 secondary to the posterior lateral epidural hemorrhage.  11/5 OR today for C6 corpectomy followed by C6/7 lami facetectomy and C5-T1 fusion.  11/9 Stage 2 likely posterior approach likely on Monday  Mendenhall collar  Phoenix Alvarez MD. Neurosurgeon. [facility].    Traumatic subdural hemorrhage (HCC)- (present on admission)  Assessment & Plan  Subdural blood tracking along the falx without significant adjacent mass effect.  Repeat interval head CT stable  TEG with platelet mapping with AA 72.4  No platelets given due to stable repeat CT   Non-operative management.  Post traumatic pharmacologic seizure prophylaxis for 1 week.  Speech Language Pathology cognitive evaluation. Recommend home health for continued speech therapy services  Phoenix Alvarez MD. Neurosurgeon. [facility].     Syncope and collapse- (present on admission)  Assessment & Plan  11/5 EKG: normal sinus rhythm   11/6 ECHO completed. Read pending.     Dyslipidemia- (present on admission)  Assessment & Plan  Chronic condition lovastatin.   11/6 Resume maintenance medication.    DM2 (diabetes mellitus, type 2) (HCC)- (present on admission)  Assessment & Plan  Chronic condition treated with Metformin and Januvia.   Holding maintenance metformin for 48 hours following intravenous contrast administration.  Insulin sliding scale coverage.     No contraindication to deep vein thrombosis (DVT) prophylaxis- (present on admission)  Assessment & Plan  Systemic anticoagulation contraindicated secondary to elevated bleeding risk.  11/6 Pharmacological DVT  prophylaxis, Heparin, initiated.     Screening examination for infectious disease- (present on admission)  Assessment & Plan  Admission SARS-CoV-2 testing negative. LOW RISK patient. Repeat SARS-CoV-2 testing not indicated. Isolation precautions de-escalated.    Trauma- (present on admission)  Assessment & Plan  GLF.  Trauma Green Transfer Activation.  Jitendra Lee MD. Trauma Surgery.      Discussed patient condition with Patient and trauma surgery, Dr. Jitendra Lee.    I saw and evaluated the patient and discussed his management with the trauma APRN, Isaias Ortiz. I reviewed the APRNs note and agree with the documented findings and plan of care. On exam he is breathing well and is neuro intact.    Jitendra Lee MD

## 2020-11-06 NOTE — THERAPY
"Speech Language Pathology   Initial Assessment     Patient Name: Isaias Ortiz  AGE:  80 y.o., SEX:  male  Medical Record #: [id number]  Today's Date: 11/6/2020     Precautions  Precautions: (P) Fall Risk, Cervical Collar      Assessment    Patient is a 79 y/o F admitted 11/5/20 from [facility] with subdural hematoma, C6 burst fx d/t mechanical fall. Pt is now s/p ACDF C5-7 w/ corpectomy. Stage II C5-T1 posterior fusion planned for Mon, 11/9. PMHx includes COPD, diabetes, HTN, HLD, prostate cancer. CT Head 11/5: \"1.  Stable subarachnoid hemorrhages along the medial vertex of the right frontal and parietal lobes. 2.  Stable subarachnoid hemorrhage along the inferior margin of the falx on the left centrally.\"    Cognitive-linguistic evaluation was completed this morning with pt scoring as follows on portions of the Cognistat: Average range for Orientation, Attention, Comprehension, Repetition, Naming, Calculations, Similarities and Judgement; Moderately impaired for Memory. The CLQT Clock Drawing was also administered with pt scoring 10/13 = Mild impairment. An informal medication management task was administered with pt needing min-mod verbal cues to figure the correct dosage and times to take the medication. Overall, he presents with mild deficits in executive functioning and mild-moderate deficits in short term/working memory. Pt verbalized good awareness and insight into his errors, appropriately frustrated with his performance. Pt is presenting with cognitive-linguistic skills that are below his premorbid baseline and will benefit from skilled SLP intervention. If pt were to discharge home, would recommend supervision with IADLs (e.g., medication/financial management, scheduling, cooking, etc.).     Plan    Mild deficits in executive functioning and mild-moderate deficits in short term/working memory.  If pt were to discharge home, would recommend supervision with IADLs (e.g., medication/financial management, scheduling, " "cooking, etc.).     Recommend Speech Therapy 3 times per week until therapy goals are met for the following treatments:  Cognitive-Linguistic Training and Patient / Family / Caregiver Education.    Discharge Recommendations: (P) Recommend home health for continued speech therapy services    Subjective    \"I hope I do better the next time you test me.\"     Objective       11/06/20 0937   Verbal Expression   Verbal Expression / Aphasia Eval (WDL) WDL   Auditory Comprehension   Auditory Comprehension (WDL) WDL   Reading Comprehension   Functional Reading Materials Supervision (5)   Comments negatively impacted by lack of reading glasses   Written Expression   Comments needs further assessment   Cognitive-Linguistic   Level of Consciousness Alert   Orientation Level Oriented x 4  (not to exact date, but close)   Sustained Attention Within Functional Limits (6-7)   Short Term Memory Moderate (3)   Long Term Memory / Reminiscing Within Functional Limits (6-7)   Simple Reasoning / Problem Solving Within Functional Limits (6-7)   Abstract Reasoning Within Functional Limits (6-7)   Insight into Deficits Within Functional Limits (6-7)   Executive Functioning / Organization Minimal (4)   Auditory Math Within Functional Limits (6-7)   Medication Management  Minimal (4)   Clock Drawing Numeric Errors;Omissions;Impaired Hand Placement   Rancho Scale Level 8   Social / Pragmatic Communication   Social / Pragmatic Communication WDL   Outcome Measures   Outcome Measures Utilized   (Cognistat; CLQT Clock Drawing; informal med mgmt task)   Short Term Goals   Short Term Goal # 1 Patient will complete executive functioning tasks with >90% accuracy given min cues.   Short Term Goal # 2 Patient will recall 4 units of new information with >90% accuracy following a 10-minute delay given min cues for strategies.         "

## 2020-11-06 NOTE — CARE PLAN
Problem: Knowledge Deficit  Goal: Knowledge of disease process/condition, treatment plan, diagnostic tests, and medications will improve  Note: To OR, NPO

## 2020-11-06 NOTE — CARE PLAN
Problem: Communication  Goal: The ability to communicate needs accurately and effectively will improve  Outcome: PROGRESSING AS EXPECTED  Note: Patient has call light within reach. All questions answered. Calls appropriately.     Problem: Venous Thromboembolism (VTW)/Deep Vein Thrombosis (DVT) Prevention:  Goal: Patient will participate in Venous Thrombosis (VTE)/Deep Vein Thrombosis (DVT)Prevention Measures  Outcome: PROGRESSING AS EXPECTED  Flowsheets (Taken 11/5/2020 2230)  Mechanical Prophylaxis: SCDs, Sequential Compression Device  SCDs, Sequential Compression Device: On

## 2020-11-07 LAB
ANION GAP SERPL CALC-SCNC: 11 MMOL/L (ref 7–16)
BASOPHILS # BLD AUTO: 0.3 % (ref 0–1.8)
BASOPHILS # BLD: 0.03 K/UL (ref 0–0.12)
BUN SERPL-MCNC: 19 MG/DL (ref 8–22)
CALCIUM SERPL-MCNC: 8.7 MG/DL (ref 8.5–10.5)
CHLORIDE SERPL-SCNC: 100 MMOL/L (ref 96–112)
CO2 SERPL-SCNC: 25 MMOL/L (ref 20–33)
CREAT SERPL-MCNC: 0.98 MG/DL (ref 0.5–1.4)
EOSINOPHIL # BLD AUTO: 0.01 K/UL (ref 0–0.51)
EOSINOPHIL NFR BLD: 0.1 % (ref 0–6.9)
ERYTHROCYTE [DISTWIDTH] IN BLOOD BY AUTOMATED COUNT: 46.8 FL (ref 35.9–50)
GLUCOSE BLD-MCNC: 164 MG/DL (ref 65–99)
GLUCOSE BLD-MCNC: 168 MG/DL (ref 65–99)
GLUCOSE BLD-MCNC: 186 MG/DL (ref 65–99)
GLUCOSE BLD-MCNC: 189 MG/DL (ref 65–99)
GLUCOSE SERPL-MCNC: 196 MG/DL (ref 65–99)
HCT VFR BLD AUTO: 38.1 % (ref 42–52)
HGB BLD-MCNC: 12.4 G/DL (ref 14–18)
IMM GRANULOCYTES # BLD AUTO: 0.08 K/UL (ref 0–0.11)
IMM GRANULOCYTES NFR BLD AUTO: 0.7 % (ref 0–0.9)
LYMPHOCYTES # BLD AUTO: 1.1 K/UL (ref 1–4.8)
LYMPHOCYTES NFR BLD: 9.2 % (ref 22–41)
MCH RBC QN AUTO: 29.2 PG (ref 27–33)
MCHC RBC AUTO-ENTMCNC: 32.5 G/DL (ref 33.7–35.3)
MCV RBC AUTO: 89.9 FL (ref 81.4–97.8)
MONOCYTES # BLD AUTO: 0.74 K/UL (ref 0–0.85)
MONOCYTES NFR BLD AUTO: 6.2 % (ref 0–13.4)
NEUTROPHILS # BLD AUTO: 9.99 K/UL (ref 1.82–7.42)
NEUTROPHILS NFR BLD: 83.5 % (ref 44–72)
NRBC # BLD AUTO: 0 K/UL
NRBC BLD-RTO: 0 /100 WBC
PLATELET # BLD AUTO: 173 K/UL (ref 164–446)
PMV BLD AUTO: 10.7 FL (ref 9–12.9)
POTASSIUM SERPL-SCNC: 4.2 MMOL/L (ref 3.6–5.5)
RBC # BLD AUTO: 4.24 M/UL (ref 4.7–6.1)
SODIUM SERPL-SCNC: 136 MMOL/L (ref 135–145)
WBC # BLD AUTO: 12 K/UL (ref 4.8–10.8)

## 2020-11-07 PROCEDURE — 700102 HCHG RX REV CODE 250 W/ 637 OVERRIDE(OP): Performed by: NURSE PRACTITIONER

## 2020-11-07 PROCEDURE — 82962 GLUCOSE BLOOD TEST: CPT | Mod: 91

## 2020-11-07 PROCEDURE — 700105 HCHG RX REV CODE 258: Performed by: NURSE PRACTITIONER

## 2020-11-07 PROCEDURE — A9270 NON-COVERED ITEM OR SERVICE: HCPCS | Performed by: NURSE PRACTITIONER

## 2020-11-07 PROCEDURE — 85025 COMPLETE CBC W/AUTO DIFF WBC: CPT

## 2020-11-07 PROCEDURE — 700111 HCHG RX REV CODE 636 W/ 250 OVERRIDE (IP): Performed by: PHYSICIAN ASSISTANT

## 2020-11-07 PROCEDURE — 94660 CPAP INITIATION&MGMT: CPT

## 2020-11-07 PROCEDURE — 94760 N-INVAS EAR/PLS OXIMETRY 1: CPT

## 2020-11-07 PROCEDURE — 36415 COLL VENOUS BLD VENIPUNCTURE: CPT

## 2020-11-07 PROCEDURE — 700102 HCHG RX REV CODE 250 W/ 637 OVERRIDE(OP): Performed by: PHYSICIAN ASSISTANT

## 2020-11-07 PROCEDURE — 80048 BASIC METABOLIC PNL TOTAL CA: CPT

## 2020-11-07 PROCEDURE — A9270 NON-COVERED ITEM OR SERVICE: HCPCS | Performed by: PHYSICIAN ASSISTANT

## 2020-11-07 PROCEDURE — 770001 HCHG ROOM/CARE - MED/SURG/GYN PRIV*

## 2020-11-07 RX ORDER — DEXAMETHASONE SODIUM PHOSPHATE 4 MG/ML
4 INJECTION, SOLUTION INTRA-ARTICULAR; INTRALESIONAL; INTRAMUSCULAR; INTRAVENOUS; SOFT TISSUE EVERY 6 HOURS
Status: COMPLETED | OUTPATIENT
Start: 2020-11-07 | End: 2020-11-08

## 2020-11-07 RX ORDER — SODIUM CHLORIDE 9 MG/ML
INJECTION, SOLUTION INTRAVENOUS CONTINUOUS
Status: DISCONTINUED | OUTPATIENT
Start: 2020-11-07 | End: 2020-11-10

## 2020-11-07 RX ADMIN — INSULIN HUMAN 2 UNITS: 100 INJECTION, SOLUTION PARENTERAL at 07:59

## 2020-11-07 RX ADMIN — INSULIN HUMAN 2 UNITS: 100 INJECTION, SOLUTION PARENTERAL at 17:53

## 2020-11-07 RX ADMIN — INSULIN HUMAN 2 UNITS: 100 INJECTION, SOLUTION PARENTERAL at 21:50

## 2020-11-07 RX ADMIN — HEPARIN SODIUM 5000 UNITS: 5000 INJECTION, SOLUTION INTRAVENOUS; SUBCUTANEOUS at 02:38

## 2020-11-07 RX ADMIN — HEPARIN SODIUM 5000 UNITS: 5000 INJECTION, SOLUTION INTRAVENOUS; SUBCUTANEOUS at 09:42

## 2020-11-07 RX ADMIN — DOCUSATE SODIUM 100 MG: 100 CAPSULE ORAL at 17:53

## 2020-11-07 RX ADMIN — SODIUM CHLORIDE: 9 INJECTION, SOLUTION INTRAVENOUS at 21:35

## 2020-11-07 RX ADMIN — INSULIN HUMAN 2 UNITS: 100 INJECTION, SOLUTION PARENTERAL at 11:28

## 2020-11-07 RX ADMIN — ACETAMINOPHEN 650 MG: 325 TABLET, FILM COATED ORAL at 17:53

## 2020-11-07 RX ADMIN — LEVETIRACETAM 500 MG: 500 TABLET ORAL at 17:53

## 2020-11-07 RX ADMIN — ACETAMINOPHEN 650 MG: 325 TABLET, FILM COATED ORAL at 11:28

## 2020-11-07 RX ADMIN — HEPARIN SODIUM 5000 UNITS: 5000 INJECTION, SOLUTION INTRAVENOUS; SUBCUTANEOUS at 17:53

## 2020-11-07 RX ADMIN — OXYCODONE HYDROCHLORIDE 5 MG: 5 TABLET ORAL at 02:38

## 2020-11-07 RX ADMIN — DEXAMETHASONE SODIUM PHOSPHATE 4 MG: 4 INJECTION, SOLUTION INTRA-ARTICULAR; INTRALESIONAL; INTRAMUSCULAR; INTRAVENOUS; SOFT TISSUE at 21:34

## 2020-11-07 ASSESSMENT — ENCOUNTER SYMPTOMS
SPEECH CHANGE: 0
NECK PAIN: 1
ABDOMINAL PAIN: 0
STRIDOR: 0
VOMITING: 0
SENSORY CHANGE: 0
FOCAL WEAKNESS: 0
SHORTNESS OF BREATH: 0
DOUBLE VISION: 0
MYALGIAS: 1
NAUSEA: 0
FEVER: 0
CHILLS: 0
BACK PAIN: 0

## 2020-11-07 ASSESSMENT — PAIN DESCRIPTION - PAIN TYPE
TYPE: ACUTE PAIN;SURGICAL PAIN

## 2020-11-07 NOTE — CARE PLAN
Problem: Safety  Goal: Will remain free from falls  Outcome: PROGRESSING AS EXPECTED  Safety precautions are in place including bed locked and in lowest position, upper bed rails up, bed alarm on, call light within reach, treaded socks on, tray table and personal belongings within reach.      Problem: Pain Management  Goal: Pain level will decrease to patient's comfort goal  Outcome: PROGRESSING AS EXPECTED  Patient's pain is well controlled with current medication regimen.

## 2020-11-07 NOTE — PROGRESS NOTES
Trauma / Surgical Daily Progress Note    Date of Service  11/7/2020    Chief Complaint  80 y.o. male admitted 11/5/2020 with C6 fracture    Interval Events    No critical events.   PO day # 2 C6 corpectomy with anterior cervical fusion C5-T1 for C6 burst fracture.  No focal deficits.  Dedicated humerus films with medial epicondyles lucency which may represent fracture versus unfused apophysis. No point tenderness on exam.    - Scheduled for stage II posterior fusion, C5-T1, on 11/9.   - Dr. Abhijit Roque, orthopedic surgery, consulted.   - Counseled     Review of Systems  Review of Systems   Constitutional: Negative for chills and fever.   HENT: Negative for hearing loss.         No difficulty swallowing    Eyes: Negative for double vision.   Respiratory: Negative for shortness of breath and stridor.    Cardiovascular: Negative for chest pain.   Gastrointestinal: Negative for abdominal pain, nausea and vomiting.   Genitourinary: Negative for dysuria.   Musculoskeletal: Positive for myalgias and neck pain. Negative for back pain and joint pain.   Neurological: Negative for sensory change, speech change and focal weakness.        Vital Signs  Temp:  [36.1 °C (97 °F)-37.7 °C (99.8 °F)] 37.7 °C (99.8 °F)  Pulse:  [62-99] 99  Resp:  [16-18] 18  BP: (111-135)/(69-72) 128/69  SpO2:  [90 %-98 %] 90 %    Physical Exam  Physical Exam  Vitals signs and nursing note reviewed.   Constitutional:       General: He is awake. He is not in acute distress.     Appearance: He is not ill-appearing, toxic-appearing or diaphoretic.      Interventions: Cervical collar and nasal cannula in place.   HENT:      Head: Normocephalic.      Mouth/Throat:      Mouth: Mucous membranes are moist.      Pharynx: Oropharynx is clear.   Eyes:      Extraocular Movements: Extraocular movements intact.      Conjunctiva/sclera: Conjunctivae normal.   Neck:      Musculoskeletal: Muscular tenderness present.      Comments: Anterior cervical incision  dressed.  No hematoma or drainage.    Cardiovascular:      Rate and Rhythm: Normal rate and regular rhythm.      Pulses: Normal pulses.   Pulmonary:      Effort: Pulmonary effort is normal.      Breath sounds: Normal breath sounds.      Comments: No stridor  Chest:      Chest wall: No tenderness.   Abdominal:      General: There is no distension.      Tenderness: There is no abdominal tenderness. There is no guarding.   Musculoskeletal:      Comments: Moves all extremities    Skin:     General: Skin is warm and dry.      Capillary Refill: Capillary refill takes less than 2 seconds.      Findings: No rash.   Neurological:      General: No focal deficit present.      Mental Status: He is alert.      GCS: GCS eye subscore is 4. GCS verbal subscore is 5. GCS motor subscore is 6.      Cranial Nerves: No cranial nerve deficit.      Sensory: No sensory deficit.   Psychiatric:         Mood and Affect: Mood normal.         Behavior: Behavior normal. Behavior is cooperative.         Thought Content: Thought content normal.         Judgment: Judgment normal.         Laboratory  Recent Results (from the past 24 hour(s))   ACCU-CHEK GLUCOSE    Collection Time: 11/06/20 12:06 PM   Result Value Ref Range    Glucose - Accu-Ck 179 (H) 65 - 99 mg/dL   EC-ECHOCARDIOGRAM COMPLETE W/O CONT    Collection Time: 11/06/20 12:33 PM   Result Value Ref Range    Eject.Frac. MOD 4C 70.45     Left Ventrical Ejection Fraction 60    ACCU-CHEK GLUCOSE    Collection Time: 11/06/20  4:45 PM   Result Value Ref Range    Glucose - Accu-Ck 133 (H) 65 - 99 mg/dL   ACCU-CHEK GLUCOSE    Collection Time: 11/06/20  8:44 PM   Result Value Ref Range    Glucose - Accu-Ck 179 (H) 65 - 99 mg/dL   ACCU-CHEK GLUCOSE    Collection Time: 11/07/20  7:55 AM   Result Value Ref Range    Glucose - Accu-Ck 189 (H) 65 - 99 mg/dL   CBC WITH DIFFERENTIAL    Collection Time: 11/07/20  8:02 AM   Result Value Ref Range    WBC 12.0 (H) 4.8 - 10.8 K/uL    RBC 4.24 (L) 4.70 - 6.10  M/uL    Hemoglobin 12.4 (L) 14.0 - 18.0 g/dL    Hematocrit 38.1 (L) 42.0 - 52.0 %    MCV 89.9 81.4 - 97.8 fL    MCH 29.2 27.0 - 33.0 pg    MCHC 32.5 (L) 33.7 - 35.3 g/dL    RDW 46.8 35.9 - 50.0 fL    Platelet Count 173 164 - 446 K/uL    MPV 10.7 9.0 - 12.9 fL    Neutrophils-Polys 83.50 (H) 44.00 - 72.00 %    Lymphocytes 9.20 (L) 22.00 - 41.00 %    Monocytes 6.20 0.00 - 13.40 %    Eosinophils 0.10 0.00 - 6.90 %    Basophils 0.30 0.00 - 1.80 %    Immature Granulocytes 0.70 0.00 - 0.90 %    Nucleated RBC 0.00 /100 WBC    Neutrophils (Absolute) 9.99 (H) 1.82 - 7.42 K/uL    Lymphs (Absolute) 1.10 1.00 - 4.80 K/uL    Monos (Absolute) 0.74 0.00 - 0.85 K/uL    Eos (Absolute) 0.01 0.00 - 0.51 K/uL    Baso (Absolute) 0.03 0.00 - 0.12 K/uL    Immature Granulocytes (abs) 0.08 0.00 - 0.11 K/uL    NRBC (Absolute) 0.00 K/uL   Basic Metabolic Panel    Collection Time: 11/07/20  8:02 AM   Result Value Ref Range    Sodium 136 135 - 145 mmol/L    Potassium 4.2 3.6 - 5.5 mmol/L    Chloride 100 96 - 112 mmol/L    Co2 25 20 - 33 mmol/L    Glucose 196 (H) 65 - 99 mg/dL    Bun 19 8 - 22 mg/dL    Creatinine 0.98 0.50 - 1.40 mg/dL    Calcium 8.7 8.5 - 10.5 mg/dL    Anion Gap 11.0 7.0 - 16.0   ESTIMATED GFR    Collection Time: 11/07/20  8:02 AM   Result Value Ref Range    GFR If African American >60 >60 mL/min/1.73 m 2    GFR If Non African American >60 >60 mL/min/1.73 m 2       Fluids    Intake/Output Summary (Last 24 hours) at 11/7/2020 1102  Last data filed at 11/7/2020 0900  Gross per 24 hour   Intake 240 ml   Output 30 ml   Net 210 ml       Core Measures & Quality Metrics  Labs reviewed, EKG reviewed, Medications reviewed and Radiology images reviewed  Cruz catheter: No Cruz      DVT Prophylaxis: Heparin  DVT prophylaxis - mechanical: SCDs  Ulcer prophylaxis: Not indicated  Antibiotics: Treating active infection/contamination beyond 24 hours perioperative coverage  Assessed for rehab: Patient returned to prior level of function,  rehabilitation not indicated at this time    MINH Score  ETOH Screening    Assessment/Plan  Traumatic closed nondisplaced fracture of proximal end of left humerus, initial encounter- (present on admission)  Assessment & Plan  CXR imaging with possible proximal left humerus fracture  11/6 Dedicated diagnostic imaging medial epicondyles lucency which may represent fracture versus unfused apophysis.   Clinical exam with no point tenderness.     Injury of vertebral artery, right, initial encounter- (present on admission)  Assessment & Plan  11/5 MRI There is nonvisualization of the flow void of the right vertebral artery. This is uncertain whether this is a chronic finding or secondary to the vertebral dissection.   11/6 CTA   - right vertebral artery is patent though this small in size throughout its course no obvious dissection flap identified. The small size likely due to normal developmental anatomy though diffuse narrowing from dissection difficult to entirely exclude.   - The left vertebral artery is patent and appears dominant.  Non-operative management.  Phoenix Alvarez MD. Neurosurgeon. [facility].     C6 cervical fracture (HCC)- (present on admission)  Assessment & Plan  C6 vertebral body fracture with 6mm of retropulsion posteriorly resulting in severe spinal canal narrowing with involvement of pedicles with posterior displacement of the vertebral body.  MRI - with burst fracture of C6 with body with posterior elements.   - Nondisplaced compression fractures of C7 and T1.  - Discontinuity of anterior and posterior longitudinal ligaments & ligamentum flavum consistent with injury.  - Discontinuity of ligamentum flavum at the level of C1-C2.   - There is increased T2 signal intensity concerning for ligamentous injury at this level.  - Abnormal T2 hyperintensity in the posterior paraspinal muscles likely representing contusion and edema.   - Mild amount of anterior prevertebral hematoma at the levels of C7,  T1 and T2.  - Mild amount of posterior lateral epidural hemorrhage noted extending from the skull base to the level of C6.  - There is anterior epidural hemorrhage at C5 and C6.  - Level of C5-6 and C6-7, there is effacement of the dorsal and ventral subarachnoid spaces secondary to the posterior retropulsion of posterior fracture and epidural hemorrhage. Severe central canal stenosis at this level.   -  Moderate central canal stenosis at the levels of C2-3, C3-4 and C4-5 secondary to the posterior lateral epidural hemorrhage.  11/5 OR today for C6 corpectomy followed by C6/7 lami facetectomy and C5-T1 fusion.  11/9 Stage 2 likely posterior approach likely on Monday  Buda collar  Phoenix Alvarez MD. Neurosurgeon. [facility].     Traumatic subdural hemorrhage (HCC)- (present on admission)  Assessment & Plan  Subdural blood tracking along the falx without significant adjacent mass effect.  Repeat interval head CT stable  TEG with platelet mapping with AA 72.4  No platelets given due to stable repeat CT   Non-operative management.  Post traumatic pharmacologic seizure prophylaxis for 1 week.  Speech Language Pathology cognitive evaluation. Recommend home health for continued speech therapy services  Phoenix Alvarez MD. Neurosurgeon. [facility].      Syncope and collapse- (present on admission)  Assessment & Plan  11/5 EKG: normal sinus rhythm   11/6 ECHO cardiogram with normal left ventricular systolic function. Ejection fraction is visually estimated to be 60%.     Dyslipidemia- (present on admission)  Assessment & Plan  Chronic condition lovastatin.   11/6 Resume maintenance medication.     DM2 (diabetes mellitus, type 2) (HCC)- (present on admission)  Assessment & Plan  Chronic condition treated with Metformin and Januvia.   Holding maintenance metformin for 48 hours following intravenous contrast administration.  Insulin sliding scale coverage.    No contraindication to deep vein thrombosis (DVT)  prophylaxis- (present on admission)  Assessment & Plan  Systemic anticoagulation contraindicated secondary to elevated bleeding risk.  11/6 Pharmacological DVT prophylaxis, Heparin, initiated.    Screening examination for infectious disease- (present on admission)  Assessment & Plan  Admission SARS-CoV-2 testing negative. LOW RISK patient. Repeat SARS-CoV-2 testing not indicated. Isolation precautions de-escalated.     Trauma- (present on admission)  Assessment & Plan  GLF.  Trauma Green Transfer Activation.  Jitendra Lee MD. Trauma Surgery.        Discussed patient condition with Patient and trauma surgery, Dr. Irvin Blanco..

## 2020-11-07 NOTE — PROGRESS NOTES
Neurosurgery Progress Note    Subjective:  Postop day 2 C6 corpectomy with anterior cervical fusion C5-T1 for C6 burst fracture   Reports today minimal neck pain  Denies radicular arm pain  Denies dysphagia  Drain with minimal output.  Scheduled for stage II on Monday, posterior fusion, C5-T1    Exam:  Anterior cervical clean dry and intact, no evidence of hematoma or seroma  Motor strength 5/5  Sensation intact    BP  Min: 111/71  Max: 135/70  Pulse  Av.2  Min: 62  Max: 99  Resp  Av.6  Min: 16  Max: 18  Temp  Av.8 °C (98.3 °F)  Min: 36.1 °C (97 °F)  Max: 37.7 °C (99.8 °F)  SpO2  Av.4 %  Min: 90 %  Max: 98 %    No data recorded    Recent Labs     20  1231 20  0359 20  0802   WBC 15.5* 10.3 12.0*   RBC 4.97 4.57* 4.24*   HEMOGLOBIN 15.0 13.3* 12.4*   HEMATOCRIT 44.1 40.3* 38.1*   MCV 88.7 88.2 89.9   MCH 30.2 29.1 29.2   MCHC 34.0 33.0* 32.5*   RDW 44.7 44.4 46.8   PLATELETCT 206 199 173   MPV 10.5 11.1 10.7     Recent Labs     20  1231 20  0359 20  0802   SODIUM 139 138 136   POTASSIUM 4.7 4.2 4.2   CHLORIDE 102 100 100   CO2 22 22 25   GLUCOSE 145* 215* 196*   BUN 23* 21 19   CREATININE 1.05 1.08 0.98   CALCIUM 10.4 9.2 8.7     Recent Labs     20  1231   APTT 26.5   INR 1.02     Recent Labs     20  1231 20  2256   REACTMIN 4.7* 6.2   CLOTKINET 2.2 1.9   CLOTANGL 60.9 64.1   MAXCLOTS 65.7 68.4   [id number] 0.0 0.0   PRCINADP 53.4 34.9   PRCINAA 94.7 72.4       Intake/Output       20 07 - 20 0659 11700 - 20 Total  Total       Intake    P.O.  200  -- 200  240  -- 240    P.O. 200 -- 200 240 -- 240    Total Intake 200 -- 200 240 -- 240       Output    Urine  --  -- --  --  -- --    Number of Times Voided 1 x 1 x 2 x 1 x -- 1 x    Drains  20  10 30  --  -- --    Output (mL) (Closed/Suction Drain 1 Anterior Neck Hemovac 10 Fr.) 20 10 30 -- -- --    Stool  --  -- --  --  -- --     Number of Times Stooled 0 x -- 0 x 0 x -- 0 x    Total Output 20 10 30 -- -- --       Net I/O     180 -10 170 240 -- 240            Intake/Output Summary (Last 24 hours) at 11/7/2020 1040  Last data filed at 11/7/2020 0900  Gross per 24 hour   Intake 240 ml   Output 30 ml   Net 210 ml            • albuterol  2 Puff Q6HRS PRN   • multivitamin  1 Tab QAM   • lovastatin  40 mg DAILY   • umeclidinium-vilanterol  1 Puff QDAILY (RT)    And   • fluticasone  2 Puff Q12HRS (RT)   • Respiratory Therapy Consult   Continuous RT   • bisacodyl  10 mg Q24HRS PRN   • acetaminophen  650 mg Q6HRS   • levETIRAcetam  500 mg BID   • insulin regular  2-9 Units 4X/DAY ACHS    And   • glucose  16 g Q15 MIN PRN    And   • dextrose 50%  50 mL Q15 MIN PRN   • labetalol  10 mg Q6HRS PRN   • Pharmacy Consult Request  1 Each PHARMACY TO DOSE   • MD ALERT...DO NOT ADMINISTER NSAIDS or ASPIRIN unless ORDERED By Neurosurgery  1 Each PRN   • docusate sodium  100 mg BID   • senna-docusate  1 Tab Nightly   • senna-docusate  1 Tab Q24HRS PRN   • polyethylene glycol/lytes  1 Packet BID PRN   • magnesium hydroxide  30 mL QDAY PRN   • bisacodyl  10 mg Q24HRS PRN   • fleet  1 Each Once PRN   • heparin  5,000 Units Q8HRS   • diphenhydrAMINE  25 mg Q6HRS PRN    Or   • diphenhydrAMINE  25 mg Q6HRS PRN   • ondansetron  4 mg Q4HRS PRN   • ondansetron  4 mg Q4HRS PRN   • methocarbamol  750 mg Q8HRS PRN   • benzocaine-menthol  1 Lozenge Q2HRS PRN   • oxyCODONE immediate-release  2.5 mg Q3HRS PRN   • oxyCODONE immediate-release  5 mg Q3HRS PRN       Assessment and Plan:  POD #2 C6 corpectomy  Stage II Monday, posterior cervical fusion C5-T1  Keep head of bed less than 45 degrees  D/c drain.   N.p.o. after midnight Sunday

## 2020-11-07 NOTE — RESPIRATORY CARE
COPD EDUCATION by COPD CLINICAL EDUCATOR  11/6/2020  at  4:57 PM by Taylor Carpio, RRT     Patient interviewed by COPD education team.  Patient unable to participate in full program.  Short intervention has been conducted.  A comprehensive packet including information about COPD and treatments. Patient does not wear oxygen, uses albuterol very rarely and takes Trelegy daily.

## 2020-11-07 NOTE — CARE PLAN
Problem: Safety  Goal: Will remain free from injury  Outcome: PROGRESSING AS EXPECTED  Safety precautions are in place including bed locked and in lowest position, upper bed rails up, bed alarm on, call light within reach, treaded socks on, tray table and personal belongings within reach.      Problem: Pain Management  Goal: Pain level will decrease to patient's comfort goal  Outcome: PROGRESSING AS EXPECTED  Patient has no complaints of pain at this time. Patient declined PRN oxycodone.

## 2020-11-07 NOTE — CARE PLAN
Problem: Skin Integrity  Goal: Risk for impaired skin integrity will decrease  Outcome: PROGRESSING AS EXPECTED  Note: Patient turns self and tolerates well. Waffle cushion in place.     Problem: Pain Management  Goal: Pain level will decrease to patient's comfort goal  Outcome: PROGRESSING AS EXPECTED  Flowsheets  Taken 11/7/2020 0031  Non Verbal Scale:   Calm   Unlabored Breathing  Taken 11/6/2020 2300  Pain Rating Scale (NPRS): 4  Note: Patient complains of slight pain in his shoulders (right worse than left). Scheduled Tylenol administered.

## 2020-11-08 LAB
GLUCOSE BLD-MCNC: 146 MG/DL (ref 65–99)
GLUCOSE BLD-MCNC: 159 MG/DL (ref 65–99)
GLUCOSE BLD-MCNC: 211 MG/DL (ref 65–99)
GLUCOSE BLD-MCNC: 218 MG/DL (ref 65–99)

## 2020-11-08 PROCEDURE — 700111 HCHG RX REV CODE 636 W/ 250 OVERRIDE (IP): Performed by: PHYSICIAN ASSISTANT

## 2020-11-08 PROCEDURE — 700102 HCHG RX REV CODE 250 W/ 637 OVERRIDE(OP): Performed by: PHYSICIAN ASSISTANT

## 2020-11-08 PROCEDURE — 82962 GLUCOSE BLOOD TEST: CPT | Mod: 91

## 2020-11-08 PROCEDURE — 92610 EVALUATE SWALLOWING FUNCTION: CPT

## 2020-11-08 PROCEDURE — 94660 CPAP INITIATION&MGMT: CPT

## 2020-11-08 PROCEDURE — A9270 NON-COVERED ITEM OR SERVICE: HCPCS | Performed by: PHYSICIAN ASSISTANT

## 2020-11-08 PROCEDURE — 770001 HCHG ROOM/CARE - MED/SURG/GYN PRIV*

## 2020-11-08 RX ADMIN — FLUTICASONE PROPIONATE 88 MCG: 44 AEROSOL, METERED RESPIRATORY (INHALATION) at 21:50

## 2020-11-08 RX ADMIN — DEXAMETHASONE SODIUM PHOSPHATE 4 MG: 4 INJECTION, SOLUTION INTRA-ARTICULAR; INTRALESIONAL; INTRAMUSCULAR; INTRAVENOUS; SOFT TISSUE at 03:23

## 2020-11-08 RX ADMIN — METHOCARBAMOL 750 MG: 750 TABLET ORAL at 18:01

## 2020-11-08 RX ADMIN — HEPARIN SODIUM 5000 UNITS: 5000 INJECTION, SOLUTION INTRAVENOUS; SUBCUTANEOUS at 03:23

## 2020-11-08 RX ADMIN — INSULIN HUMAN 3 UNITS: 100 INJECTION, SOLUTION PARENTERAL at 12:34

## 2020-11-08 RX ADMIN — INSULIN HUMAN 3 UNITS: 100 INJECTION, SOLUTION PARENTERAL at 08:34

## 2020-11-08 ASSESSMENT — ENCOUNTER SYMPTOMS
NAUSEA: 0
STRIDOR: 0
BACK PAIN: 0
DOUBLE VISION: 0
NECK PAIN: 1
FEVER: 0
MYALGIAS: 1
SHORTNESS OF BREATH: 0
VOMITING: 0
SENSORY CHANGE: 0
ABDOMINAL PAIN: 0
SPEECH CHANGE: 0
CHILLS: 0
FOCAL WEAKNESS: 0

## 2020-11-08 NOTE — PROGRESS NOTES
Neurosurgery Progress Note    Subjective:  Postop day 3 C6 corpectomy with anterior cervical fusion C5-T1 for C6 burst fracture   Reports today minimal neck pain  Denies radicular arm pain  Developed some dysphagia last night, improved somewhat this AM after a few doses of steroids.  Swallow eval pending today.   Drain with minimal output.  Scheduled for stage II on Monday, posterior fusion, C5-T1    Exam:  Anterior cervical clean dry and intact, no evidence of hematoma or seroma  Motor strength 5/5  Sensation intact  Voice slightly hoarse.     BP  Min: 131/71  Max: 163/88  Pulse  Av.6  Min: 88  Max: 98  Resp  Av  Min: 16  Max: 20  Temp  Av.9 °C (98.5 °F)  Min: 36.8 °C (98.2 °F)  Max: 37.2 °C (98.9 °F)  SpO2  Av.2 %  Min: 90 %  Max: 91 %    No data recorded    Recent Labs     20  1231 20  0359 20  0802   WBC 15.5* 10.3 12.0*   RBC 4.97 4.57* 4.24*   HEMOGLOBIN 15.0 13.3* 12.4*   HEMATOCRIT 44.1 40.3* 38.1*   MCV 88.7 88.2 89.9   MCH 30.2 29.1 29.2   MCHC 34.0 33.0* 32.5*   RDW 44.7 44.4 46.8   PLATELETCT 206 199 173   MPV 10.5 11.1 10.7     Recent Labs     20  1231 20  0359 20  0802   SODIUM 139 138 136   POTASSIUM 4.7 4.2 4.2   CHLORIDE 102 100 100   CO2 22 22 25   GLUCOSE 145* 215* 196*   BUN 23* 21 19   CREATININE 1.05 1.08 0.98   CALCIUM 10.4 9.2 8.7     Recent Labs     20  1231   APTT 26.5   INR 1.02     Recent Labs     20  1231 20  2256   REACTMIN 4.7* 6.2   CLOTKINET 2.2 1.9   CLOTANGL 60.9 64.1   MAXCLOTS 65.7 68.4   [id number] 0.0 0.0   PRCINADP 53.4 34.9   PRCINAA 94.7 72.4       Intake/Output       20 07 - 2059 20 - 20 Total  Total       Intake    P.O.  240  -- 240  --  -- --    P.O. 240 -- 240 -- -- --    I.V.  --  120 120  --  -- --    Volume (mL) (NS infusion) -- 120 120 -- -- --    Total Intake 240 120 360 -- -- --       Output    Urine  --  -- --   --  -- --    Number of Times Voided 2 x 2 x 4 x 1 x -- 1 x    Drains  10  15 25  10  -- 10    Output (mL) ([REMOVED] Closed/Suction Drain 1 Anterior Neck Hemovac 10 Fr.) 10 15 25 10 -- 10    Stool  --  -- --  --  -- --    Number of Times Stooled 0 x -- 0 x -- -- --    Total Output 10 15 25 10 -- 10       Net I/O     230 105 335 -10 -- -10            Intake/Output Summary (Last 24 hours) at 11/8/2020 0918  Last data filed at 11/8/2020 0905  Gross per 24 hour   Intake 120 ml   Output 35 ml   Net 85 ml            • NS   Continuous   • albuterol  2 Puff Q6HRS PRN   • multivitamin  1 Tab QAM   • lovastatin  40 mg DAILY   • umeclidinium-vilanterol  1 Puff QDAILY (RT)    And   • fluticasone  2 Puff Q12HRS (RT)   • Respiratory Therapy Consult   Continuous RT   • bisacodyl  10 mg Q24HRS PRN   • acetaminophen  650 mg Q6HRS   • levETIRAcetam  500 mg BID   • insulin regular  2-9 Units 4X/DAY ACHS    And   • glucose  16 g Q15 MIN PRN    And   • dextrose 50%  50 mL Q15 MIN PRN   • labetalol  10 mg Q6HRS PRN   • Pharmacy Consult Request  1 Each PHARMACY TO DOSE   • MD ALERT...DO NOT ADMINISTER NSAIDS or ASPIRIN unless ORDERED By Neurosurgery  1 Each PRN   • docusate sodium  100 mg BID   • senna-docusate  1 Tab Nightly   • senna-docusate  1 Tab Q24HRS PRN   • polyethylene glycol/lytes  1 Packet BID PRN   • magnesium hydroxide  30 mL QDAY PRN   • bisacodyl  10 mg Q24HRS PRN   • fleet  1 Each Once PRN   • heparin  5,000 Units Q8HRS   • ondansetron  4 mg Q4HRS PRN   • ondansetron  4 mg Q4HRS PRN   • methocarbamol  750 mg Q8HRS PRN   • benzocaine-menthol  1 Lozenge Q2HRS PRN   • oxyCODONE immediate-release  2.5 mg Q3HRS PRN       Assessment and Plan:  POD #3 C6 corpectomy  Stage II Monday, posterior cervical fusion C5-T1  Keep head of bed less than 45 degrees.  Swallow eval today.   D/c drain this AM.   N.p.o. after midnight tonight, ok for sips with meds.

## 2020-11-08 NOTE — CARE PLAN
Problem: Infection  Goal: Will remain free from infection  Outcome: PROGRESSING AS EXPECTED  Patient's surgical dressing is clean, dry and intact. Dressing and dajuan-wound will be monitored for signs of infection. Standard precautions in place.       Problem: Fluid Volume:  Goal: Will maintain balanced intake and output  Outcome: PROGRESSING AS EXPECTED  Patient on NS at 50 ml/hr due to current NPO status pending swallow evaluation by SLP. Patient has no issues voiding.

## 2020-11-08 NOTE — CARE PLAN
Problem: Safety  Goal: Will remain free from injury  Outcome: PROGRESSING AS EXPECTED  Note: Patient encouraged to use call light when needing to go to the bathroom. Bed alarm is in place.    Goal: Will remain free from falls  Outcome: PROGRESSING AS EXPECTED     Problem: Respiratory:  Goal: Respiratory status will improve  Outcome: PROGRESSING AS EXPECTED  Note: Patient on 3L oxygen via oxymask and CPAP at night.

## 2020-11-08 NOTE — PROGRESS NOTES
Trauma / Surgical Daily Progress Note    Date of Service  11/8/2020    Chief Complaint  80 y.o. male admitted 11/5/2020 with C6 fracture    Interval Events    Events noted.   Improved swallowing this AM post steroids.  No stridor or respiratory difficulty.   No focal neurological deficits.    - Speech Language Pathology for swallow eval.   - OR tomorrow 11/9 for Stage II posterior fusion, C5-T1.  - Possible left humerus fracture versus unfused apophysis, reviewed with Isaias Peraza PA-C orthopedic surgery.    - Disposition pending post surgical therapy recommendations   - Counseled    Review of Systems  Review of Systems   Constitutional: Negative for chills and fever.   HENT: Negative for hearing loss.         Swallowing better this AM    Eyes: Negative for double vision.   Respiratory: Negative for shortness of breath and stridor.    Cardiovascular: Negative for chest pain.   Gastrointestinal: Negative for abdominal pain, nausea and vomiting.   Genitourinary: Negative for dysuria.   Musculoskeletal: Positive for myalgias and neck pain. Negative for back pain and joint pain.   Neurological: Negative for sensory change, speech change and focal weakness.        Vital Signs  Temp:  [36.8 °C (98.2 °F)-37.2 °C (98.9 °F)] 36.9 °C (98.5 °F)  Pulse:  [88-98] 88  Resp:  [16-20] 18  BP: (131-163)/(71-88) 148/84  SpO2:  [90 %-91 %] 90 %    Physical Exam  Physical Exam  Vitals signs and nursing note reviewed.   Constitutional:       General: He is awake. He is not in acute distress.     Appearance: He is not ill-appearing, toxic-appearing or diaphoretic.      Interventions: Cervical collar and nasal cannula in place.   HENT:      Head: Normocephalic.      Mouth/Throat:      Mouth: Mucous membranes are moist.      Pharynx: Oropharynx is clear.   Eyes:      Extraocular Movements: Extraocular movements intact.      Conjunctiva/sclera: Conjunctivae normal.   Neck:      Musculoskeletal: Muscular tenderness present.      Comments:  Anterior cervical incision dressed.  No hematoma or drainage.    Cardiovascular:      Rate and Rhythm: Normal rate and regular rhythm.      Pulses: Normal pulses.   Pulmonary:      Effort: Pulmonary effort is normal.      Breath sounds: Normal breath sounds.      Comments: No stridor  Chest:      Chest wall: No tenderness.   Abdominal:      General: There is no distension.      Tenderness: There is no abdominal tenderness. There is no guarding.   Musculoskeletal:      Comments: Moves all extremities    Skin:     General: Skin is warm and dry.      Capillary Refill: Capillary refill takes less than 2 seconds.      Findings: No rash.   Neurological:      General: No focal deficit present.      Mental Status: He is alert.      GCS: GCS eye subscore is 4. GCS verbal subscore is 5. GCS motor subscore is 6.      Cranial Nerves: No cranial nerve deficit.      Sensory: No sensory deficit.   Psychiatric:         Mood and Affect: Mood normal.         Behavior: Behavior normal. Behavior is cooperative.         Thought Content: Thought content normal.         Judgment: Judgment normal.         Laboratory  Recent Results (from the past 24 hour(s))   ACCU-CHEK GLUCOSE    Collection Time: 11/07/20 11:26 AM   Result Value Ref Range    Glucose - Accu-Ck 186 (H) 65 - 99 mg/dL   ACCU-CHEK GLUCOSE    Collection Time: 11/07/20  4:51 PM   Result Value Ref Range    Glucose - Accu-Ck 164 (H) 65 - 99 mg/dL   ACCU-CHEK GLUCOSE    Collection Time: 11/07/20  9:48 PM   Result Value Ref Range    Glucose - Accu-Ck 168 (H) 65 - 99 mg/dL       Fluids    Intake/Output Summary (Last 24 hours) at 11/8/2020 0843  Last data filed at 11/8/2020 0400  Gross per 24 hour   Intake 360 ml   Output 25 ml   Net 335 ml       Core Measures & Quality Metrics  Labs reviewed, EKG reviewed, Medications reviewed and Radiology images reviewed  Cruz catheter: No Cruz      DVT Prophylaxis: Heparin  DVT prophylaxis - mechanical: SCDs  Ulcer prophylaxis: Not  indicated  Antibiotics: Treating active infection/contamination beyond 24 hours perioperative coverage  Assessed for rehab: Patient returned to prior level of function, rehabilitation not indicated at this time    RAP Score Total: 9    ETOH Screening  CAGE Score: 0  Assessment complete date: 11/6/2020 (ODALYS negative. No intervention warrented)        Assessment/Plan  Traumatic closed nondisplaced fracture of proximal end of left humerus, initial encounter- (present on admission)  Assessment & Plan  CXR imaging with possible proximal left humerus fracture  11/6 Dedicated diagnostic imaging medial epicondyles lucency which may represent fracture versus unfused apophysis.   Clinical exam with no point tenderness.   Definitive plan pending.  Weight bearing status - Weightbearing as tolerated OSCAR Roque MD. Orthopedic Surgeon. [facility].    Injury of vertebral artery, right, initial encounter- (present on admission)  Assessment & Plan  11/5 MRI There is nonvisualization of the flow void of the right vertebral artery. This is uncertain whether this is a chronic finding or secondary to the vertebral dissection.   11/6 CTA   - right vertebral artery is patent though this small in size throughout its course no obvious dissection flap identified. The small size likely due to normal developmental anatomy though diffuse narrowing from dissection difficult to entirely exclude.   - The left vertebral artery is patent and appears dominant.  Non-operative management.  Phoenix Alvarez MD. Neurosurgeon. [facility].      C6 cervical fracture (HCC)- (present on admission)  Assessment & Plan  C6 vertebral body fracture with 6mm of retropulsion posteriorly resulting in severe spinal canal narrowing with involvement of pedicles with posterior displacement of the vertebral body.  MRI - with burst fracture of C6 with body with posterior elements.   - Nondisplaced compression fractures of C7 and T1.  -  Discontinuity of anterior and posterior longitudinal ligaments & ligamentum flavum consistent with injury.  - Discontinuity of ligamentum flavum at the level of C1-C2.   - There is increased T2 signal intensity concerning for ligamentous injury at this level.  - Abnormal T2 hyperintensity in the posterior paraspinal muscles likely representing contusion and edema.   - Mild amount of anterior prevertebral hematoma at the levels of C7, T1 and T2.  - Mild amount of posterior lateral epidural hemorrhage noted extending from the skull base to the level of C6.  - There is anterior epidural hemorrhage at C5 and C6.  - Level of C5-6 and C6-7, there is effacement of the dorsal and ventral subarachnoid spaces secondary to the posterior retropulsion of posterior fracture and epidural hemorrhage. Severe central canal stenosis at this level.   -  Moderate central canal stenosis at the levels of C2-3, C3-4 and C4-5 secondary to the posterior lateral epidural hemorrhage.  11/5 OR today for C6 corpectomy followed by C6/7 lami facetectomy and C5-T1 fusion.  11/8  No onset of difficulty swallowing. 4mg IV decadron q6h x2 doses given per neurosurgery.  11/9 Stage 2 likely posterior approach likely on Monday  Beaufort collar  Phoenix Alvarez MD. Neurosurgeon. [facility].      Traumatic subdural hemorrhage (HCC)- (present on admission)  Assessment & Plan  Subdural blood tracking along the falx without significant adjacent mass effect.  Repeat interval head CT stable  TEG with platelet mapping with AA 72.4  No platelets given due to stable repeat CT   Non-operative management.  Post traumatic pharmacologic seizure prophylaxis for 1 week.  Speech Language Pathology cognitive evaluation. Recommend home health for continued speech therapy services  Phoenix Alvarez MD. Neurosurgeon. [facility].       Syncope and collapse- (present on admission)  Assessment & Plan  11/5 EKG: normal sinus rhythm   11/6 ECHO cardiogram with normal left  ventricular systolic function. Ejection fraction is visually estimated to be 60%.      Dyslipidemia- (present on admission)  Assessment & Plan  Chronic condition lovastatin.    11/6 Resume maintenance medication.     DM2 (diabetes mellitus, type 2) (HCC)- (present on admission)  Assessment & Plan  Chronic condition treated with Metformin and Januvia.   Holding maintenance medications due to contrast administration and NPO status for staged surgical repairs.    Insulin sliding scale.    No contraindication to deep vein thrombosis (DVT) prophylaxis- (present on admission)  Assessment & Plan  Systemic anticoagulation contraindicated secondary to elevated bleeding risk.  11/6 Pharmacological DVT prophylaxis, Heparin, initiated.     Screening examination for infectious disease- (present on admission)  Assessment & Plan  Admission SARS-CoV-2 testing negative. LOW RISK patient. Repeat SARS-CoV-2 testing not indicated. Isolation precautions de-escalated.      Trauma- (present on admission)  Assessment & Plan  GLF.  Trauma Green Transfer Activation.  Jitendra Lee MD. Trauma Surgery.       Discussed patient condition with Patient and trauma surgery, Dr. Irvin Blanco.

## 2020-11-08 NOTE — THERAPY
"Speech Language Pathology   Clinical Swallow Evaluation     Patient Name: Isaias Ortiz  AGE:  80 y.o., SEX:  male  Medical Record #: [id number]  Today's Date: 11/8/2020     Precautions  Precautions: Fall Risk, Swallow Precautions ( See Comments), Cervical Collar      Assessment    Patient is a 79 y/o F admitted 11/5/20 from [facility] with subdural hematoma, C6 burst fx d/t mechanical fall. Pt is now s/p ACDF C5-7 w/ corpectomy. Stage II C5-T1 posterior fusion planned for Mon, 11/9. PMHx includes COPD, diabetes, HTN, HLD, prostate cancer. CT Head 11/5: \"1.  Stable subarachnoid hemorrhages along the medial vertex of the right frontal and parietal lobes. 2.  Stable subarachnoid hemorrhage along the inferior margin of the falx on the left centrally.\"    Pt seen on this date for a clinical swallow evaluation. Pt made NPO last night d/t difficulty swallowing and given decadron. Pt A&Ox3 with disorientation to date. He endorsed difficulty swallowing, but believes that it is better today than last night. He reported a hx of phlegm, however, unable to cough to clear it with this clinician at bedside; he denied difficulty managing secretions. No gross asymmetry appreciated during the oral motor evaluation. Trials of single ice chips, MTL, apple sauce, pudding, and thin liquids resulted in immediate coughing and throat clearing which is highly concerning for penetration/aspiration. Upon palpation, pt completed multiple effortful swallows which may be concerning for pharyngeal residue and denied globus sensation. At this time, pt showing overt s/sx of aspiration so would recommend NPO. Per RN, pt to have stage II cervical spine surgery tomorrow at 5:30pm so may benefit from a non-oral source of nutrition in the mean time. RN to discuss with AUGUST Easton for up to 10 single ice chips an hour with RN to minimize xerostomia and maintain integrity of the swallow.    Plan    Recommend NPO, okay for 10 single ice chips with RN. As pt to be NPO " for stage II c-spine surgery tomorrow may benefit from an alternative source of nutrition or re-evaluation of swallow function on 11/10.    Recommend Speech Therapy 5 times per week until therapy goals are met for the following treatments:  Dysphagia Training, Cognitive-Linguistic Training and Patient / Family / Caregiver Education.    Discharge Recommendations: (P) Recommend post-acute placement for additional speech therapy services prior to discharge home       Objective       11/08/20 1149   Precautions   Precautions Fall Risk;Swallow Precautions ( See Comments);Cervical Collar     Vitals   O2 (LPM) 0   O2 Delivery Device None - Room Air   Pain 0 - 10 Group   Therapist Pain Assessment Post Activity Pain Same as Prior to Activity;Nurse Notified;0   Prior Living Situation   Lives with - Patient's Self Care Capacity Spouse   Prior Level Of Function   Communication Within Functional Limits   Swallow Within Functional Limits   Dentition Intact   Hearing Impaired Both Ears;Within Functional Limits for Evaluation   Hearing Aid None   Vision Wears Corrective Lenses;Reading    Patient's Primary Language English   Education Some College or Trade School   Occupation (Pre-Hospital Vocational) Retired Due To Age  (former )   Oral Motor Eval    Is Patient Able to Complete Oral Motor Eval Yes, Within Normal Limits   Oral Food Presentation   Ice Chips Minimal   Single Swallow Mildly Thick (2) - (Nectar Thick)  Moderate   Single Swallow Thin (0) Severe   Liquidised (3) Minimal   Pureed (4) Moderate   Tracheostomy   Tracheostomy  No   Dysphagia Strategies / Recommendations   Strategies / Interventions Recommended (Yes / No) Yes   Compensatory Strategies To Be Assessed   Diet / Liquid Recommendation NPO;Pre-Feeding Trials with SLP Only   Medication Administration  Other (See Comments)  (non-oral)   Therapy Interventions No Swallowing Intervention Required;Oral Motor Exercises;Pharyngeal / Laryngeal Exercises    Short Term Goals   Short Term Goal # 1 Patient will complete executive functioning tasks with >90% accuracy given min cues.   Short Term Goal # 2 Patient will recall 4 units of new information with >90% accuracy following a 10-minute delay given min cues for strategies.   Short Term Goal # 3 NEW 11/8: Pt will consume prefeeding trials with no overt s/sx of aspiration

## 2020-11-09 ENCOUNTER — ANESTHESIA EVENT (OUTPATIENT)
Dept: SURGERY | Facility: MEDICAL CENTER | Age: 80
DRG: 453 | End: 2020-11-09
Payer: MEDICARE

## 2020-11-09 ENCOUNTER — APPOINTMENT (OUTPATIENT)
Dept: RADIOLOGY | Facility: MEDICAL CENTER | Age: 80
DRG: 453 | End: 2020-11-09
Attending: NEUROLOGICAL SURGERY
Payer: MEDICARE

## 2020-11-09 ENCOUNTER — ANESTHESIA (OUTPATIENT)
Dept: SURGERY | Facility: MEDICAL CENTER | Age: 80
DRG: 453 | End: 2020-11-09
Payer: MEDICARE

## 2020-11-09 PROBLEM — Z02.9 DISCHARGE PLANNING ISSUES: Status: ACTIVE | Noted: 2020-11-09

## 2020-11-09 PROBLEM — R13.12 OROPHARYNGEAL DYSPHAGIA: Status: ACTIVE | Noted: 2020-11-09

## 2020-11-09 LAB
GLUCOSE BLD-MCNC: 149 MG/DL (ref 65–99)
GLUCOSE BLD-MCNC: 171 MG/DL (ref 65–99)
GLUCOSE BLD-MCNC: 177 MG/DL (ref 65–99)
INR PPP: 1.09 (ref 0.87–1.13)
PROTHROMBIN TIME: 14.5 SEC (ref 12–14.6)

## 2020-11-09 PROCEDURE — 700102 HCHG RX REV CODE 250 W/ 637 OVERRIDE(OP): Performed by: NURSE PRACTITIONER

## 2020-11-09 PROCEDURE — 700101 HCHG RX REV CODE 250: Performed by: ANESTHESIOLOGY

## 2020-11-09 PROCEDURE — 700102 HCHG RX REV CODE 250 W/ 637 OVERRIDE(OP): Performed by: NEUROLOGICAL SURGERY

## 2020-11-09 PROCEDURE — 94660 CPAP INITIATION&MGMT: CPT

## 2020-11-09 PROCEDURE — 160002 HCHG RECOVERY MINUTES (STAT): Performed by: NEUROLOGICAL SURGERY

## 2020-11-09 PROCEDURE — 0RG2071 FUSION OF 2 OR MORE CERVICAL VERTEBRAL JOINTS WITH AUTOLOGOUS TISSUE SUBSTITUTE, POSTERIOR APPROACH, POSTERIOR COLUMN, OPEN APPROACH: ICD-10-PCS | Performed by: NEUROLOGICAL SURGERY

## 2020-11-09 PROCEDURE — 95925 SOMATOSENSORY TESTING: CPT | Performed by: NEUROLOGICAL SURGERY

## 2020-11-09 PROCEDURE — 502000 HCHG MISC OR IMPLANTS RC 0278: Performed by: NEUROLOGICAL SURGERY

## 2020-11-09 PROCEDURE — 160041 HCHG SURGERY MINUTES - EA ADDL 1 MIN LEVEL 4: Performed by: NEUROLOGICAL SURGERY

## 2020-11-09 PROCEDURE — 99223 1ST HOSP IP/OBS HIGH 75: CPT | Performed by: PHYSICAL MEDICINE & REHABILITATION

## 2020-11-09 PROCEDURE — 95937 NEUROMUSCULAR JUNCTION TEST: CPT | Performed by: NEUROLOGICAL SURGERY

## 2020-11-09 PROCEDURE — 700105 HCHG RX REV CODE 258: Performed by: ANESTHESIOLOGY

## 2020-11-09 PROCEDURE — 0RG4071 FUSION OF CERVICOTHORACIC VERTEBRAL JOINT WITH AUTOLOGOUS TISSUE SUBSTITUTE, POSTERIOR APPROACH, POSTERIOR COLUMN, OPEN APPROACH: ICD-10-PCS | Performed by: NEUROLOGICAL SURGERY

## 2020-11-09 PROCEDURE — 110454 HCHG SHELL REV 250: Performed by: NEUROLOGICAL SURGERY

## 2020-11-09 PROCEDURE — 00QT0ZZ REPAIR SPINAL MENINGES, OPEN APPROACH: ICD-10-PCS | Performed by: NEUROLOGICAL SURGERY

## 2020-11-09 PROCEDURE — 700102 HCHG RX REV CODE 250 W/ 637 OVERRIDE(OP): Performed by: ANESTHESIOLOGY

## 2020-11-09 PROCEDURE — A9270 NON-COVERED ITEM OR SERVICE: HCPCS | Performed by: ANESTHESIOLOGY

## 2020-11-09 PROCEDURE — 160029 HCHG SURGERY MINUTES - 1ST 30 MINS LEVEL 4: Performed by: NEUROLOGICAL SURGERY

## 2020-11-09 PROCEDURE — 700101 HCHG RX REV CODE 250: Performed by: NEUROLOGICAL SURGERY

## 2020-11-09 PROCEDURE — 700111 HCHG RX REV CODE 636 W/ 250 OVERRIDE (IP): Performed by: NEUROLOGICAL SURGERY

## 2020-11-09 PROCEDURE — 4A11X4G MONITORING OF PERIPHERAL NERVOUS ELECTRICAL ACTIVITY, INTRAOPERATIVE, EXTERNAL APPROACH: ICD-10-PCS | Performed by: NEUROLOGICAL SURGERY

## 2020-11-09 PROCEDURE — 82962 GLUCOSE BLOOD TEST: CPT

## 2020-11-09 PROCEDURE — A9270 NON-COVERED ITEM OR SERVICE: HCPCS | Performed by: NEUROLOGICAL SURGERY

## 2020-11-09 PROCEDURE — 700111 HCHG RX REV CODE 636 W/ 250 OVERRIDE (IP)

## 2020-11-09 PROCEDURE — A9270 NON-COVERED ITEM OR SERVICE: HCPCS | Performed by: NURSE PRACTITIONER

## 2020-11-09 PROCEDURE — 160009 HCHG ANES TIME/MIN: Performed by: NEUROLOGICAL SURGERY

## 2020-11-09 PROCEDURE — 500374 HCHG DRAIN, J-P FLAT 7MM FULL: Performed by: NEUROLOGICAL SURGERY

## 2020-11-09 PROCEDURE — 94640 AIRWAY INHALATION TREATMENT: CPT

## 2020-11-09 PROCEDURE — 770001 HCHG ROOM/CARE - MED/SURG/GYN PRIV*

## 2020-11-09 PROCEDURE — C1713 ANCHOR/SCREW BN/BN,TIS/BN: HCPCS | Performed by: NEUROLOGICAL SURGERY

## 2020-11-09 PROCEDURE — 500864 HCHG NEEDLE, SPINAL 18G: Performed by: NEUROLOGICAL SURGERY

## 2020-11-09 PROCEDURE — 95861 NEEDLE EMG 2 EXTREMITIES: CPT | Performed by: NEUROLOGICAL SURGERY

## 2020-11-09 PROCEDURE — 72040 X-RAY EXAM NECK SPINE 2-3 VW: CPT

## 2020-11-09 PROCEDURE — 700106 HCHG RX REV CODE 271: Performed by: NEUROLOGICAL SURGERY

## 2020-11-09 PROCEDURE — 85610 PROTHROMBIN TIME: CPT

## 2020-11-09 PROCEDURE — 95939 C MOTOR EVOKED UPR&LWR LIMBS: CPT | Performed by: NEUROLOGICAL SURGERY

## 2020-11-09 PROCEDURE — 501838 HCHG SUTURE GENERAL: Performed by: NEUROLOGICAL SURGERY

## 2020-11-09 PROCEDURE — 110371 HCHG SHELL REV 272: Performed by: NEUROLOGICAL SURGERY

## 2020-11-09 PROCEDURE — 160036 HCHG PACU - EA ADDL 30 MINS PHASE I: Performed by: NEUROLOGICAL SURGERY

## 2020-11-09 PROCEDURE — 160048 HCHG OR STATISTICAL LEVEL 1-5: Performed by: NEUROLOGICAL SURGERY

## 2020-11-09 PROCEDURE — 700111 HCHG RX REV CODE 636 W/ 250 OVERRIDE (IP): Performed by: ANESTHESIOLOGY

## 2020-11-09 PROCEDURE — 700105 HCHG RX REV CODE 258: Performed by: NURSE PRACTITIONER

## 2020-11-09 PROCEDURE — 160035 HCHG PACU - 1ST 60 MINS PHASE I: Performed by: NEUROLOGICAL SURGERY

## 2020-11-09 PROCEDURE — 95940 IONM IN OPERATNG ROOM 15 MIN: CPT | Performed by: NEUROLOGICAL SURGERY

## 2020-11-09 PROCEDURE — 36415 COLL VENOUS BLD VENIPUNCTURE: CPT

## 2020-11-09 DEVICE — GRAFT BONE PROGENIX PLUS 5CC: Type: IMPLANTABLE DEVICE | Site: SPINE CERVICAL | Status: FUNCTIONAL

## 2020-11-09 DEVICE — DURASEAL SEALANT SYSTEM 5ML - (5/BX): Type: IMPLANTABLE DEVICE | Site: EPIDURAL SPACE | Status: FUNCTIONAL

## 2020-11-09 RX ORDER — OXYCODONE HCL 5 MG/5 ML
10 SOLUTION, ORAL ORAL
Status: DISCONTINUED | OUTPATIENT
Start: 2020-11-09 | End: 2020-11-10 | Stop reason: HOSPADM

## 2020-11-09 RX ORDER — DIPHENHYDRAMINE HYDROCHLORIDE 50 MG/ML
12.5 INJECTION INTRAMUSCULAR; INTRAVENOUS
Status: DISCONTINUED | OUTPATIENT
Start: 2020-11-09 | End: 2020-11-10 | Stop reason: HOSPADM

## 2020-11-09 RX ORDER — BUPIVACAINE HYDROCHLORIDE 5 MG/ML
INJECTION, SOLUTION EPIDURAL; INTRACAUDAL
Status: DISCONTINUED | OUTPATIENT
Start: 2020-11-09 | End: 2020-11-09 | Stop reason: HOSPADM

## 2020-11-09 RX ORDER — ONDANSETRON 2 MG/ML
4 INJECTION INTRAMUSCULAR; INTRAVENOUS
Status: DISCONTINUED | OUTPATIENT
Start: 2020-11-09 | End: 2020-11-10 | Stop reason: HOSPADM

## 2020-11-09 RX ORDER — BUPIVACAINE HYDROCHLORIDE AND EPINEPHRINE 5; 5 MG/ML; UG/ML
INJECTION, SOLUTION EPIDURAL; INTRACAUDAL; PERINEURAL
Status: DISCONTINUED | OUTPATIENT
Start: 2020-11-09 | End: 2020-11-09 | Stop reason: HOSPADM

## 2020-11-09 RX ORDER — LABETALOL HYDROCHLORIDE 5 MG/ML
5 INJECTION, SOLUTION INTRAVENOUS
Status: DISCONTINUED | OUTPATIENT
Start: 2020-11-09 | End: 2020-11-10 | Stop reason: HOSPADM

## 2020-11-09 RX ORDER — CEFAZOLIN SODIUM 1 G/3ML
INJECTION, POWDER, FOR SOLUTION INTRAMUSCULAR; INTRAVENOUS PRN
Status: DISCONTINUED | OUTPATIENT
Start: 2020-11-09 | End: 2020-11-09 | Stop reason: SURG

## 2020-11-09 RX ORDER — DEXTROSE MONOHYDRATE 25 G/50ML
50 INJECTION, SOLUTION INTRAVENOUS
Status: DISCONTINUED | OUTPATIENT
Start: 2020-11-09 | End: 2020-11-10 | Stop reason: HOSPADM

## 2020-11-09 RX ORDER — REMIFENTANIL HYDROCHLORIDE 1 MG/ML
INJECTION, POWDER, LYOPHILIZED, FOR SOLUTION INTRAVENOUS
Status: DISCONTINUED | OUTPATIENT
Start: 2020-11-09 | End: 2020-11-09 | Stop reason: SURG

## 2020-11-09 RX ORDER — DEXAMETHASONE SODIUM PHOSPHATE 4 MG/ML
INJECTION, SOLUTION INTRA-ARTICULAR; INTRALESIONAL; INTRAMUSCULAR; INTRAVENOUS; SOFT TISSUE PRN
Status: DISCONTINUED | OUTPATIENT
Start: 2020-11-09 | End: 2020-11-09 | Stop reason: SURG

## 2020-11-09 RX ORDER — HYDROMORPHONE HYDROCHLORIDE 1 MG/ML
0.4 INJECTION, SOLUTION INTRAMUSCULAR; INTRAVENOUS; SUBCUTANEOUS
Status: DISCONTINUED | OUTPATIENT
Start: 2020-11-09 | End: 2020-11-10 | Stop reason: HOSPADM

## 2020-11-09 RX ORDER — HALOPERIDOL 5 MG/ML
1 INJECTION INTRAMUSCULAR
Status: DISCONTINUED | OUTPATIENT
Start: 2020-11-09 | End: 2020-11-10 | Stop reason: HOSPADM

## 2020-11-09 RX ORDER — CEFAZOLIN SODIUM 1 G/3ML
INJECTION, POWDER, FOR SOLUTION INTRAMUSCULAR; INTRAVENOUS
Status: DISCONTINUED | OUTPATIENT
Start: 2020-11-09 | End: 2020-11-09 | Stop reason: HOSPADM

## 2020-11-09 RX ORDER — BACITRACIN ZINC 500 [USP'U]/G
OINTMENT TOPICAL
Status: DISCONTINUED | OUTPATIENT
Start: 2020-11-09 | End: 2020-11-09 | Stop reason: HOSPADM

## 2020-11-09 RX ORDER — PHENYLEPHRINE HYDROCHLORIDE 10 MG/ML
INJECTION, SOLUTION INTRAMUSCULAR; INTRAVENOUS; SUBCUTANEOUS PRN
Status: DISCONTINUED | OUTPATIENT
Start: 2020-11-09 | End: 2020-11-09 | Stop reason: SURG

## 2020-11-09 RX ORDER — DEXAMETHASONE SODIUM PHOSPHATE 4 MG/ML
4 INJECTION, SOLUTION INTRA-ARTICULAR; INTRALESIONAL; INTRAMUSCULAR; INTRAVENOUS; SOFT TISSUE EVERY 6 HOURS
Status: COMPLETED | OUTPATIENT
Start: 2020-11-10 | End: 2020-11-10

## 2020-11-09 RX ORDER — SODIUM CHLORIDE, SODIUM LACTATE, POTASSIUM CHLORIDE, AND CALCIUM CHLORIDE .6; .31; .03; .02 G/100ML; G/100ML; G/100ML; G/100ML
IRRIGANT IRRIGATION
Status: DISCONTINUED | OUTPATIENT
Start: 2020-11-09 | End: 2020-11-09 | Stop reason: HOSPADM

## 2020-11-09 RX ORDER — SODIUM CHLORIDE, SODIUM LACTATE, POTASSIUM CHLORIDE, CALCIUM CHLORIDE 600; 310; 30; 20 MG/100ML; MG/100ML; MG/100ML; MG/100ML
INJECTION, SOLUTION INTRAVENOUS CONTINUOUS
Status: DISCONTINUED | OUTPATIENT
Start: 2020-11-09 | End: 2020-11-10 | Stop reason: HOSPADM

## 2020-11-09 RX ORDER — DEXAMETHASONE SODIUM PHOSPHATE 4 MG/ML
INJECTION, SOLUTION INTRA-ARTICULAR; INTRALESIONAL; INTRAMUSCULAR; INTRAVENOUS; SOFT TISSUE
Status: COMPLETED
Start: 2020-11-09 | End: 2020-11-09

## 2020-11-09 RX ORDER — SODIUM CHLORIDE, SODIUM LACTATE, POTASSIUM CHLORIDE, CALCIUM CHLORIDE 600; 310; 30; 20 MG/100ML; MG/100ML; MG/100ML; MG/100ML
INJECTION, SOLUTION INTRAVENOUS
Status: DISCONTINUED | OUTPATIENT
Start: 2020-11-09 | End: 2020-11-09 | Stop reason: SURG

## 2020-11-09 RX ORDER — HYDROMORPHONE HYDROCHLORIDE 1 MG/ML
0.2 INJECTION, SOLUTION INTRAMUSCULAR; INTRAVENOUS; SUBCUTANEOUS
Status: DISCONTINUED | OUTPATIENT
Start: 2020-11-09 | End: 2020-11-10 | Stop reason: HOSPADM

## 2020-11-09 RX ORDER — PHENYLEPHRINE HCL IN 0.9% NACL 0.5 MG/5ML
SYRINGE (ML) INTRAVENOUS PRN
Status: DISCONTINUED | OUTPATIENT
Start: 2020-11-09 | End: 2020-11-09 | Stop reason: SURG

## 2020-11-09 RX ORDER — OXYCODONE HCL 5 MG/5 ML
5 SOLUTION, ORAL ORAL
Status: DISCONTINUED | OUTPATIENT
Start: 2020-11-09 | End: 2020-11-10 | Stop reason: HOSPADM

## 2020-11-09 RX ORDER — IPRATROPIUM BROMIDE AND ALBUTEROL SULFATE 2.5; .5 MG/3ML; MG/3ML
3 SOLUTION RESPIRATORY (INHALATION)
Status: DISCONTINUED | OUTPATIENT
Start: 2020-11-09 | End: 2020-11-10 | Stop reason: HOSPADM

## 2020-11-09 RX ORDER — ONDANSETRON 2 MG/ML
INJECTION INTRAMUSCULAR; INTRAVENOUS PRN
Status: DISCONTINUED | OUTPATIENT
Start: 2020-11-09 | End: 2020-11-09 | Stop reason: SURG

## 2020-11-09 RX ORDER — HYDROMORPHONE HYDROCHLORIDE 1 MG/ML
0.1 INJECTION, SOLUTION INTRAMUSCULAR; INTRAVENOUS; SUBCUTANEOUS
Status: DISCONTINUED | OUTPATIENT
Start: 2020-11-09 | End: 2020-11-10 | Stop reason: HOSPADM

## 2020-11-09 RX ADMIN — REMIFENTANIL HYDROCHLORIDE 0.05 MCG/KG/MIN: 1 INJECTION, POWDER, LYOPHILIZED, FOR SOLUTION INTRAVENOUS at 18:27

## 2020-11-09 RX ADMIN — PHENYLEPHRINE HYDROCHLORIDE 50 MCG: 10 INJECTION INTRAVENOUS at 18:17

## 2020-11-09 RX ADMIN — SODIUM CHLORIDE, POTASSIUM CHLORIDE, SODIUM LACTATE AND CALCIUM CHLORIDE: 600; 310; 30; 20 INJECTION, SOLUTION INTRAVENOUS at 18:11

## 2020-11-09 RX ADMIN — Medication 100 MCG: at 20:11

## 2020-11-09 RX ADMIN — CEFAZOLIN 2 G: 330 INJECTION, POWDER, FOR SOLUTION INTRAMUSCULAR; INTRAVENOUS at 18:27

## 2020-11-09 RX ADMIN — EPHEDRINE SULFATE 5 MG: 50 INJECTION INTRAMUSCULAR; INTRAVENOUS; SUBCUTANEOUS at 20:21

## 2020-11-09 RX ADMIN — LABETALOL HYDROCHLORIDE 5 MG: 5 INJECTION, SOLUTION INTRAVENOUS at 21:57

## 2020-11-09 RX ADMIN — ONDANSETRON 4 MG: 2 INJECTION INTRAMUSCULAR; INTRAVENOUS at 20:24

## 2020-11-09 RX ADMIN — PROPOFOL 150 MG: 10 INJECTION, EMULSION INTRAVENOUS at 18:17

## 2020-11-09 RX ADMIN — Medication 150 MG: at 18:17

## 2020-11-09 RX ADMIN — RACEPINEPHRINE HYDROCHLORIDE 0.5 ML: 11.25 SOLUTION RESPIRATORY (INHALATION) at 23:01

## 2020-11-09 RX ADMIN — ACETAMINOPHEN 650 MG: 325 TABLET, FILM COATED ORAL at 04:26

## 2020-11-09 RX ADMIN — FLUTICASONE PROPIONATE 88 MCG: 44 AEROSOL, METERED RESPIRATORY (INHALATION) at 08:00

## 2020-11-09 RX ADMIN — PHENYLEPHRINE HYDROCHLORIDE 100 MCG: 10 INJECTION INTRAVENOUS at 19:42

## 2020-11-09 RX ADMIN — FENTANYL CITRATE 50 MCG: 50 INJECTION, SOLUTION INTRAMUSCULAR; INTRAVENOUS at 21:33

## 2020-11-09 RX ADMIN — Medication 100 MCG: at 20:27

## 2020-11-09 RX ADMIN — EPHEDRINE SULFATE 7.5 MG: 50 INJECTION INTRAMUSCULAR; INTRAVENOUS; SUBCUTANEOUS at 19:49

## 2020-11-09 RX ADMIN — LOVASTATIN 40 MG: 20 TABLET ORAL at 04:26

## 2020-11-09 RX ADMIN — FENTANYL CITRATE 50 MCG: 50 INJECTION, SOLUTION INTRAMUSCULAR; INTRAVENOUS at 18:13

## 2020-11-09 RX ADMIN — EPHEDRINE SULFATE 5 MG: 50 INJECTION INTRAMUSCULAR; INTRAVENOUS; SUBCUTANEOUS at 20:12

## 2020-11-09 RX ADMIN — SODIUM CHLORIDE: 9 INJECTION, SOLUTION INTRAVENOUS at 10:13

## 2020-11-09 RX ADMIN — FENTANYL CITRATE 50 MCG: 50 INJECTION, SOLUTION INTRAMUSCULAR; INTRAVENOUS at 21:03

## 2020-11-09 RX ADMIN — PROPOFOL 100 MCG/KG/MIN: 10 INJECTION, EMULSION INTRAVENOUS at 18:27

## 2020-11-09 RX ADMIN — DEXAMETHASONE SODIUM PHOSPHATE 4 MG: 4 INJECTION, SOLUTION INTRA-ARTICULAR; INTRALESIONAL; INTRAMUSCULAR; INTRAVENOUS; SOFT TISSUE at 23:20

## 2020-11-09 RX ADMIN — PHENYLEPHRINE HYDROCHLORIDE 100 MCG: 10 INJECTION INTRAVENOUS at 19:31

## 2020-11-09 RX ADMIN — INSULIN HUMAN 2 UNITS: 100 INJECTION, SOLUTION PARENTERAL at 05:07

## 2020-11-09 RX ADMIN — LEVETIRACETAM 500 MG: 500 TABLET ORAL at 04:27

## 2020-11-09 RX ADMIN — DEXAMETHASONE SODIUM PHOSPHATE 4 MG: 4 INJECTION, SOLUTION INTRA-ARTICULAR; INTRALESIONAL; INTRAMUSCULAR; INTRAVENOUS; SOFT TISSUE at 18:27

## 2020-11-09 RX ADMIN — IPRATROPIUM BROMIDE AND ALBUTEROL SULFATE 3 ML: .5; 3 SOLUTION RESPIRATORY (INHALATION) at 22:04

## 2020-11-09 RX ADMIN — PROPOFOL 50 MG: 10 INJECTION, EMULSION INTRAVENOUS at 18:22

## 2020-11-09 RX ADMIN — LABETALOL HYDROCHLORIDE 5 MG: 5 INJECTION, SOLUTION INTRAVENOUS at 22:48

## 2020-11-09 RX ADMIN — THERA TABS 1 TABLET: TAB at 04:27

## 2020-11-09 RX ADMIN — Medication 100 MCG: at 20:02

## 2020-11-09 ASSESSMENT — ENCOUNTER SYMPTOMS
SPEECH CHANGE: 0
VOMITING: 0
STRIDOR: 0
SENSORY CHANGE: 0
MYALGIAS: 1
NECK PAIN: 1
FOCAL WEAKNESS: 0
DOUBLE VISION: 0
CHILLS: 0
FEVER: 0
ABDOMINAL PAIN: 0
BACK PAIN: 0
NAUSEA: 0
SHORTNESS OF BREATH: 0

## 2020-11-09 ASSESSMENT — PATIENT HEALTH QUESTIONNAIRE - PHQ9
1. LITTLE INTEREST OR PLEASURE IN DOING THINGS: NOT AT ALL
SUM OF ALL RESPONSES TO PHQ9 QUESTIONS 1 AND 2: 0
2. FEELING DOWN, DEPRESSED, IRRITABLE, OR HOPELESS: NOT AT ALL

## 2020-11-09 ASSESSMENT — PAIN DESCRIPTION - PAIN TYPE: TYPE: ACUTE PAIN;SURGICAL PAIN

## 2020-11-09 ASSESSMENT — PAIN SCALES - GENERAL: PAIN_LEVEL: 0

## 2020-11-09 NOTE — ASSESSMENT & PLAN NOTE
Likely related to anterior cervical repair.   11/8  New onset of difficulty swallowing. 4mg IV decadron q6h x2 doses given per neurosurgery.   - Speech language pathology for swallow evaluation.  Recommend NPO.

## 2020-11-09 NOTE — CARE PLAN
Problem: Communication  Goal: The ability to communicate needs accurately and effectively will improve  Outcome: PROGRESSING AS EXPECTED     Problem: Safety  Goal: Will remain free from falls  Outcome: PROGRESSING AS EXPECTED      Endometrial Biopsy was performed as follows: A speculum placed in   vagina with good visualization of cervix; cervix swabbed x3 with   Betadine solution. Anterior lip of cervix grasped with single   toothed tenaculum; endometrial sampling acheived with Explora I curette  sampling unit. Tissue collected, labelled and sent to Pathology.   Instruments removed, hemostasis acheived with ease. The patient   tolerated the procedure well. Large amount of tissue obtained.    Imp: fibroid uterus          Menorrhagia          Anemia s/p transfusion  Plan: awaiting results            Will start on Provera 10 mg daily till next Depo-Provera                Injection on 5/12/17.

## 2020-11-09 NOTE — THERAPY
Missed Therapy     Patient Name: Isaias Ortiz  Age:  80 y.o., Sex:  male  Medical Record #: [id number]  Today's Date: 11/9/2020    Discussed missed therapy with Rn        11/09/20 0847   Initial Contact Note    Initial Contact Note Order Received and Verified, Occupational Therapy Evaluation in Progress with Full Report to Follow.   Interdisciplinary Plan of Care Collaboration   Collaboration Comments OT eval held, stage 2 sx today will follow up post op   Session Information   Date / Session Number  11/9 sx    Priority   (needs eval )

## 2020-11-09 NOTE — CARE PLAN
Problem: Bowel/Gastric:  Goal: Normal bowel function is maintained or improved  Outcome: PROGRESSING AS EXPECTED  Patient had bowel movement yesterday. NPO at this time for surgery.     Problem: Knowledge Deficit  Goal: Knowledge of the prescribed therapeutic regimen will improve  Outcome: PROGRESSING AS EXPECTED  Patient is A+O x 4 and understands plan of care. All questions answered at this time.

## 2020-11-09 NOTE — CONSULTS
"    Physical Medicine and Rehabilitation Consultation         Initial Consult      Date of initial consultation: 2020  Consulting provider: RADHA Jaramillo  Reason for consultation: assess for acute inpatient rehab appropriateness  LOS: 4 Day(s)    Chief complaint: C6 burst fracture     HPI: The patient is a 80 y.o. right hand dominant male with a past medical history of hypertension, diabetes, hyperlipidemia, prostate cancer, COPD on CPAP at night;  who presented on 2020 12:19 PM after mechanical ground-level fall after tripping over something [place].  Patient did have head strike but did not lose consciousness.  Initially seen at [facility] and noted to have SDH and C6 fracture, transferred here for higher level of care.  Patient seen by neurosurgery who noted a C6 burst fracture over a C2-C6 anterior cervical disc autofusion with retropulsion, significant stenosis, and bilateral C6-7 jumped facets as well as a 3 column fracture.  Patient was taken emergently for C5-7 fusion.  Patient scheduled for stage II posterior fusion C5-T1 today 2020    The patient currently reports that he is feeling \"a hell of a lot better than yesterday\". He is amenable to rehab if needed. States he was seen by PT today and walked around the unit.     THERAPY:  Restrictions: C-collar   PT: Functional mobility   Pending    OT: ADLs  Pending    SLP:   : N.p.o.  11/10:  At this time, would recommend initiation of soft and bite sized diet (SB6) with mildly thick liquids (MT2) and close monitoring for any S/Sx of aspiration. Please encourage double swallow.    Social Hx:  1 SH  2 SHIRA  With: spouse     Employment: retired   Tobacco: no  Alcohol: no   Drugs: no     IMAGIN/5 MR C-spine  1.  There is burst fracture of C6 with body with posterior elements. There are also nondisplaced compression fractures of C7 and T1.  2.  There is discontinuity of anterior and posterior longitudinal " ligaments and ligamentum flavum consistent with ligamentous injury.  3.  There is also discontinuity of ligamentum flavum at the level of C1-C2. There is increased T2 signal intensity concerning for ligamentous injury at this level.  4.   There is abnormal T2 hyperintensity in the posterior paraspinal muscles likely representing contusion and edema. There is mild amount of anterior prevertebral hematoma at the levels of C7, T1 and T2.  5.  There is mild amount of posterior lateral epidural hemorrhage noted extending from the skull base to the level of C6. There is anterior epidural hemorrhage at C5 and C6.  6.  At the level of C5-6 and C6-7, there is effacement of the dorsal and ventral subarachnoid spaces secondary to the posterior retropulsion of posterior fracture and epidural hemorrhage. There is severe central canal stenosis at this level. There is no   abnormal intramedullary T2 signal intensity at this level.  7.  Moderate central canal stenosis at the levels of C2-3, C3-4 and C4-5 secondary to the posterior lateral epidural hemorrhage.  8.  There is nonvisualization of the flow void of the right vertebral artery. This is uncertain whether this is a chronic finding or secondary to the vertebral dissection. The left vertebral artery flow-void is widely patent.  9.  There is an approximately 9 mm left thyroid nodule.    PROCEDURES:  11/5/2020   SURGEON:  Phoenix Alvarez MD  1. C6 anterior corpectomy, decompression of thecal sac.  2. C6 titanium with expandable cage placement.  3. C5, C6, C7 interbody/allograft fusion.  4. C5, C7 anterior plating fixation.  5. Microscopic dissection.  6. Intraoperative monitoring.        11/09/2020   SURGEON:  Phoenix Alvarez MD  1. C4, C5, C6, C7, T1 laminectomy, decompression of thecal sac and nerve   roots.  2. C4, C5, C6, T1 lateral mass and pedicle screw fixation.  3. C4, C5, C6, C7, T1 posterolateral allograft autograft alexa fusion.  4. Intraoperative  monitoring.       PMH:  Past Medical History:   Diagnosis Date   • Chronic obstructive pulmonary disease (HCC)    • Diabetes (HCC)    • High cholesterol    • Hypertension    • Prostate cancer (HCC)     past hx       PSH:  Past Surgical History:   Procedure Laterality Date   • CERVICAL DISK AND FUSION ANTERIOR  11/5/2020    Procedure: DISCECTOMY, SPINE, CERVICAL, ANTERIOR APPROACH, WITH FUSION C5-7;  Surgeon: Phoenix Alvarez III, M.D.;  Location: SURGERY [facility];  Service: Neurosurgery   • CORPECTOMY  11/5/2020    Procedure: CORPECTOMY, SPINE C6;  Surgeon: Phoenix Alvarez III, M.D.;  Location: SURGERY [facility];  Service: Neurosurgery   • CYST EXCISION      from rectum       FHX:  Non-pertinent to today's issues    Medications:  Current Facility-Administered Medications   Medication Dose   • NS infusion     • albuterol inhaler 2 Puff  2 Puff   • multivitamin (THERAGRAN) tablet 1 Tab  1 Tab   • lovastatin (MEVACOR) tablet 40 mg  40 mg   • umeclidinium-vilanterol (ANORO ELLIPTA) inhaler 1 Puff  1 Puff    And   • fluticasone (FLOVENT HFA) 44 MCG/ACT inhaler 88 mcg  2 Puff   • Respiratory Therapy Consult     • bisacodyl (DULCOLAX) suppository 10 mg  10 mg   • acetaminophen (Tylenol) tablet 650 mg  650 mg   • levETIRAcetam (KEPPRA) tablet 500 mg  500 mg   • insulin regular (HumuLIN R,NovoLIN R) injection  2-9 Units    And   • glucose 4 g chewable tablet 16 g  16 g    And   • dextrose 50% (D50W) injection 50 mL  50 mL   • labetalol (NORMODYNE/TRANDATE) injection 10 mg  10 mg   • Pharmacy Consult Request ...Pain Management Review 1 Each  1 Each   • MD ALERT...DO NOT ADMINISTER NSAIDS or ASPIRIN unless ORDERED By Neurosurgery 1 Each  1 Each   • docusate sodium (COLACE) capsule 100 mg  100 mg   • senna-docusate (PERICOLACE or SENOKOT S) 8.6-50 MG per tablet 1 Tab  1 Tab   • senna-docusate (PERICOLACE or SENOKOT S) 8.6-50 MG per tablet 1 Tab  1 Tab   • polyethylene glycol/lytes (MIRALAX) PACKET 1 Packet  1 Packet   •  "magnesium hydroxide (MILK OF MAGNESIA) suspension 30 mL  30 mL   • bisacodyl (DULCOLAX) suppository 10 mg  10 mg   • fleet enema 133 mL  1 Each   • [Held by provider] heparin injection 5,000 Units  5,000 Units   • ondansetron (ZOFRAN) syringe/vial injection 4 mg  4 mg   • ondansetron (ZOFRAN ODT) dispertab 4 mg  4 mg   • methocarbamol (ROBAXIN) tablet 750 mg  750 mg   • benzocaine-menthol (CEPACOL) lozenge 1 Lozenge  1 Lozenge   • oxyCODONE immediate-release (ROXICODONE) tablet 2.5 mg  2.5 mg       Allergies:  Allergies   Allergen Reactions   • Codeine Anaphylaxis   • Opium Anaphylaxis       Physical Exam:  Vitals: /88   Pulse 76   Temp 36.4 °C (97.6 °F) (Temporal)   Resp 17   Ht 1.727 m (5' 8\")   Wt 99.2 kg (218 lb 11.1 oz)   SpO2 90%   Gen: NAD  Head: C-collar in place, Surgical dressings anterior neck visible. head NC/AT  Eyes/ Nose/ Mouth: PERRLA, moist mucous membranes  Cardio: RRR, no mumurs  Pulm: CTAB, with normal respiratory effort  Abd: Soft NTND, active bowel sounds,   Ext: No peripheral edema. No calf tenderness. No clubbing/cyanosis.     Mental status:  A&Ox4 (person, place, date, situation) answers questions appropriately follows commands  Speech: fluent, no aphasia or dysarthria    CRANIAL NERVES:  2,3: visual acuity grossly intact, PERRL  3,4,6: EOMI bilaterally, no nystagmus or diplopia  5: sensation intact to light touch bilaterally and symmetric  7: no facial asymmetry  8: hearing grossly intact  9,10: symmetric palate elevation  11: SCM/Trapezius strength 5/5 bilaterally  12: tongue protrudes midline      Motor:      Upper Extremity  Myotome R L   Shoulder flexion C5 5 4/5   Elbow flexion C5 5 5   Wrist extension C6 5 4/5   Elbow extension C7 5 4/5   Finger flexion C8 5 5   Finger abduction T1 5 5     Lower Extremity Myotome R L   Hip flexion L2 5 5   Knee extension L3 5 5   Ankle dorsiflexion L4 5 5   Toe extension L5 5 5   Ankle plantarflexion S1 5 5       Sensory:   intact to light " touch through out    DTRs: 2+ in bilateral  brachioradialis, 2+ in bilateral patellar tendons    Tone: no spasticity noted, no cogwheeling noted    Labs: Reviewed and significant for   Recent Labs     11/07/20 0802 11/09/20 0720   RBC 4.24*  --    HEMOGLOBIN 12.4*  --    HEMATOCRIT 38.1*  --    PLATELETCT 173  --    PROTHROMBTM  --  14.5   INR  --  1.09     Recent Labs     11/07/20  0802   SODIUM 136   POTASSIUM 4.2   CHLORIDE 100   CO2 25   GLUCOSE 196*   BUN 19   CREATININE 0.98   CALCIUM 8.7     Recent Results (from the past 24 hour(s))   ACCU-CHEK GLUCOSE    Collection Time: 11/08/20  4:30 PM   Result Value Ref Range    Glucose - Accu-Ck 146 (H) 65 - 99 mg/dL   ACCU-CHEK GLUCOSE    Collection Time: 11/08/20  9:45 PM   Result Value Ref Range    Glucose - Accu-Ck 159 (H) 65 - 99 mg/dL   ACCU-CHEK GLUCOSE    Collection Time: 11/09/20  5:04 AM   Result Value Ref Range    Glucose - Accu-Ck 177 (H) 65 - 99 mg/dL   Prothrombin Time    Collection Time: 11/09/20  7:20 AM   Result Value Ref Range    PT 14.5 12.0 - 14.6 sec    INR 1.09 0.87 - 1.13   ACCU-CHEK GLUCOSE    Collection Time: 11/09/20 12:22 PM   Result Value Ref Range    Glucose - Accu-Ck 171 (H) 65 - 99 mg/dL         ASSESSMENT:  Patient is a 80 y.o. male admitted with C6 burst fracture and SDH now s/p 2 stage cervical fusion     [facility] Code / Diagnosis to Support: 0002.22 - Brain Dysfunction: Traumatic, Closed Injury and 0003.9 - Neurologic Conditions: Other Neurologic    Rehabilitation: Impaired ADLs and mobility  Patient is a good candidate for inpatient rehab based on PRESUMED needs for PT, OT, and speech therapy.  Patient will also benefit from family training.  Patient has a good discharge situation which will be home with spouse.     Barriers to transfer include: Insurance authorization, TCCs to verify disposition, medical clearance and bed availability     Additional Recommendations:  - Awaiting PT and OT notes. Suspect patient will qualify for IPR. He  has swallow dysfunction and clinical weakness in the LUE in the C5 - C8 distributions which will require rehab. Otherwise, he is very strong. LE's 5/5.   - Pain well controlled, no neuropathic pain   - Per NSG, leave drain in place until 11/11  - Decadron 4mg Q6   - HOB >45 degrees      Medical Complexity:  C5-C8 weakness   Hypertension   DM   COPD on CPAP at night     DVT PPX: Lovenox 40mg inj daily       Thank you for allowing us to participate in the care of this patient.     Patient was seen for 83 minutes on unit/floor of which > 50% of time was spent on counseling and coordination of care regarding the above, including prognosis, risk reduction, benefits of treatment, and options for next stage of care.    Houston Rivera, DO   Physical Medicine and Rehabilitation

## 2020-11-09 NOTE — PROGRESS NOTES
"Patient seen and examined  No complaints of elbow pain      /88   Pulse 76   Temp 36.4 °C (97.6 °F) (Temporal)   Resp 17   Ht 1.727 m (5' 8\")   Wt 99.2 kg (218 lb 11.1 oz)   SpO2 90%     Recent Labs     11/07/20  0802   WBC 12.0*   RBC 4.24*   HEMOGLOBIN 12.4*   HEMATOCRIT 38.1*   MCV 89.9   MCH 29.2   MCHC 32.5*   RDW 46.8   PLATELETCT 173   MPV 10.7       No acute distress  Dressing clean dry and intact  Neurovascularly intact    POD# N/A    Plan:  WBAT  No ortho intervention needed   Signing off          "

## 2020-11-09 NOTE — PROGRESS NOTES
Neurosurgery Progress Note    Subjective:  Postop day 4 C6 corpectomy with anterior cervical fusion C5-T1 for C6 burst fracture   Reports today minimal neck pain  Denies radicular arm pain  Developed some dysphagia this weekend  Speech has made her NPO and recommending follow up studies after todays surgeries  Drain out  Scheduled for stage II today, posterior fusion, C5-T1    Exam:  Anterior cervical clean dry and intact, no evidence of hematoma or seroma  Motor strength 5/5  Sensation intact  Voice slightly hoarse.     BP  Min: 143/88  Max: 159/96  Pulse  Av.8  Min: 76  Max: 88  Resp  Av.6  Min: 17  Max: 18  Temp  Av.3 °C (97.4 °F)  Min: 36 °C (96.8 °F)  Max: 36.7 °C (98 °F)  SpO2  Av.2 %  Min: 90 %  Max: 94 %    No data recorded    Recent Labs     20  0802   WBC 12.0*   RBC 4.24*   HEMOGLOBIN 12.4*   HEMATOCRIT 38.1*   MCV 89.9   MCH 29.2   MCHC 32.5*   RDW 46.8   PLATELETCT 173   MPV 10.7     Recent Labs     20  0802   SODIUM 136   POTASSIUM 4.2   CHLORIDE 100   CO2 25   GLUCOSE 196*   BUN 19   CREATININE 0.98   CALCIUM 8.7               Intake/Output       20 - 20 0659 20 - 11/10/20 0659      [id number] Total 1900-0659 Total       Intake    Total Intake -- -- -- -- -- --       Output    Urine  --  -- --  --  -- --    Number of Times Voided 1 x -- 1 x -- -- --    Drains  10  -- 10  --  -- --    Output (mL) ([REMOVED] Closed/Suction Drain 1 Anterior Neck Hemovac 10 Fr.) 10 -- 10 -- -- --    Stool  --  -- --  --  -- --    Number of Times Stooled 1 x -- 1 x -- -- --    Total Output 10 -- 10 -- -- --       Net I/O     -10 -- -10 -- -- --            Intake/Output Summary (Last 24 hours) at 2020 0817  Last data filed at 2020 0905  Gross per 24 hour   Intake --   Output 10 ml   Net -10 ml            • NS   Continuous   • albuterol  2 Puff Q6HRS PRN   • multivitamin  1 Tab QAM   • lovastatin  40 mg DAILY   • umeclidinium-vilanterol   1 Puff QDAILY (RT)    And   • fluticasone  2 Puff Q12HRS (RT)   • Respiratory Therapy Consult   Continuous RT   • bisacodyl  10 mg Q24HRS PRN   • acetaminophen  650 mg Q6HRS   • levETIRAcetam  500 mg BID   • insulin regular  2-9 Units 4X/DAY ACHS    And   • glucose  16 g Q15 MIN PRN    And   • dextrose 50%  50 mL Q15 MIN PRN   • labetalol  10 mg Q6HRS PRN   • Pharmacy Consult Request  1 Each PHARMACY TO DOSE   • MD ALERT...DO NOT ADMINISTER NSAIDS or ASPIRIN unless ORDERED By Neurosurgery  1 Each PRN   • docusate sodium  100 mg BID   • senna-docusate  1 Tab Nightly   • senna-docusate  1 Tab Q24HRS PRN   • polyethylene glycol/lytes  1 Packet BID PRN   • magnesium hydroxide  30 mL QDAY PRN   • bisacodyl  10 mg Q24HRS PRN   • fleet  1 Each Once PRN   • [Held by provider] heparin  5,000 Units Q8HRS   • ondansetron  4 mg Q4HRS PRN   • ondansetron  4 mg Q4HRS PRN   • methocarbamol  750 mg Q8HRS PRN   • benzocaine-menthol  1 Lozenge Q2HRS PRN   • oxyCODONE immediate-release  2.5 mg Q3HRS PRN       Assessment and Plan:  POD #4 C6 corpectomy  Stage II today, posterior cervical fusion C5-T1  Keep head of bed less than 45 degrees.  N.p.o.

## 2020-11-09 NOTE — THERAPY
Missed Therapy     Patient Name: Isaias Ortiz  Age:  80 y.o., Sex:  male  Medical Record #: [id number]  Today's Date: 11/9/2020 11/09/20 0744   Interdisciplinary Plan of Care Collaboration   Collaboration Comments PT on hold as pt awaiting stage II of surgery planned for 11/9/20. PT will see pt post-operatively as appropriate.

## 2020-11-09 NOTE — PROGRESS NOTES
Trauma / Surgical Daily Progress Note    Date of Service  11/9/2020    Chief Complaint  80 y.o. male admitted 11/5/2020 with C6 fracture    Interval Events    PO day # 4 C6 corpectomy with anterior cervical fusion C5-T1.  Dysphagia improved post steroids on 11/8. SLP recommending NPO.  No focal neurological deficits  Orthopedic note reviewed. Distal humeral enthesophyte. No acute fracture or injury    - OR today for C5-T1 posterior fusion.   - Physiatry consult placed.   - Not medically cleared for post aute services at this time.  - Counseled     Review of Systems  Review of Systems   Constitutional: Negative for chills and fever.   HENT: Negative for hearing loss.         No difficulty swallowing   Eyes: Negative for double vision.   Respiratory: Negative for shortness of breath and stridor.         Denies stridor   Cardiovascular: Negative for chest pain.   Gastrointestinal: Negative for abdominal pain, nausea and vomiting.   Genitourinary: Negative for dysuria.   Musculoskeletal: Positive for myalgias and neck pain. Negative for back pain and joint pain.   Neurological: Negative for sensory change, speech change and focal weakness.        Vital Signs  Temp:  [36 °C (96.8 °F)-36.7 °C (98 °F)] 36.4 °C (97.6 °F)  Pulse:  [76-88] 76  Resp:  [17-18] 17  BP: (143-159)/(73-96) 143/88  SpO2:  [90 %-94 %] 90 %    Physical Exam  Physical Exam  Vitals signs and nursing note reviewed.   Constitutional:       General: He is awake. He is not in acute distress.     Appearance: He is not ill-appearing, toxic-appearing or diaphoretic.      Interventions: Cervical collar and nasal cannula in place.   HENT:      Head: Normocephalic.      Mouth/Throat:      Mouth: Mucous membranes are moist.      Pharynx: Oropharynx is clear.   Eyes:      Extraocular Movements: Extraocular movements intact.      Conjunctiva/sclera: Conjunctivae normal.   Neck:      Musculoskeletal: Muscular tenderness present.      Comments: Anterior cervical  incision dressed.  No hematoma or drainage.    Cardiovascular:      Rate and Rhythm: Normal rate and regular rhythm.      Pulses: Normal pulses.   Pulmonary:      Effort: Pulmonary effort is normal.      Breath sounds: Normal breath sounds.      Comments: No stridor  Chest:      Chest wall: No tenderness.   Abdominal:      General: There is no distension.      Tenderness: There is no abdominal tenderness. There is no guarding.   Musculoskeletal:      Comments: Moves all extremities    Skin:     General: Skin is warm and dry.      Capillary Refill: Capillary refill takes less than 2 seconds.      Findings: No rash.   Neurological:      General: No focal deficit present.      Mental Status: He is alert.      GCS: GCS eye subscore is 4. GCS verbal subscore is 5. GCS motor subscore is 6.      Cranial Nerves: No cranial nerve deficit.      Sensory: No sensory deficit.   Psychiatric:         Mood and Affect: Mood normal.         Behavior: Behavior normal. Behavior is cooperative.         Thought Content: Thought content normal.         Judgment: Judgment normal.         Laboratory  Recent Results (from the past 24 hour(s))   ACCU-CHEK GLUCOSE    Collection Time: 11/08/20 12:02 PM   Result Value Ref Range    Glucose - Accu-Ck 211 (H) 65 - 99 mg/dL   ACCU-CHEK GLUCOSE    Collection Time: 11/08/20  4:30 PM   Result Value Ref Range    Glucose - Accu-Ck 146 (H) 65 - 99 mg/dL   ACCU-CHEK GLUCOSE    Collection Time: 11/08/20  9:45 PM   Result Value Ref Range    Glucose - Accu-Ck 159 (H) 65 - 99 mg/dL   ACCU-CHEK GLUCOSE    Collection Time: 11/09/20  5:04 AM   Result Value Ref Range    Glucose - Accu-Ck 177 (H) 65 - 99 mg/dL   Prothrombin Time    Collection Time: 11/09/20  7:20 AM   Result Value Ref Range    PT 14.5 12.0 - 14.6 sec    INR 1.09 0.87 - 1.13       Fluids  No intake or output data in the 24 hours ending 11/09/20 1029    Core Measures & Quality Metrics  Labs reviewed, EKG reviewed, Medications reviewed and Radiology  images reviewed  Cruz catheter: No Cruz      DVT Prophylaxis: Heparin  DVT prophylaxis - mechanical: SCDs  Ulcer prophylaxis: Not indicated  Antibiotics: Treating active infection/contamination beyond 24 hours perioperative coverage  Assessed for rehab: Patient returned to prior level of function, rehabilitation not indicated at this time    RAP Score Total: 9    ETOH Screening  CAGE Score: 0  Assessment complete date: 11/6/2020 (ODALYS negative. No intervention warrented)        Assessment/Plan  Discharge planning issues- (present on admission)  Assessment & Plan  Date of admission: 11/5/2020. Admitted to [facility].   Date: 11/9 Rehab consult   Cleared for discharge: No  Discharge delayed: No    Discharge date: TBD    Oropharyngeal dysphagia- (present on admission)  Assessment & Plan  Likely related to anterior cervical repair.   11/8  No onset of difficulty swallowing. 4mg IV decadron q6h x2 doses given per neurosurgery.   - Speech language pathology for swallow evaluation.  Recommend NPO.     Injury of vertebral artery, right, initial encounter- (present on admission)  Assessment & Plan  11/5 MRI There is nonvisualization of the flow void of the right vertebral artery. This is uncertain whether this is a chronic finding or secondary to the vertebral dissection.   11/6 CTA   - right vertebral artery is patent though this small in size throughout its course no obvious dissection flap identified. The small size likely due to normal developmental anatomy though diffuse narrowing from dissection difficult to entirely exclude.   - The left vertebral artery is patent and appears dominant.  Non-operative management.  Phoenix Alvarez MD. Neurosurgeon. [facility].      C6 cervical fracture (HCC)- (present on admission)  Assessment & Plan  C6 vertebral body fracture with 6mm of retropulsion posteriorly resulting in severe spinal canal narrowing with involvement of pedicles with posterior displacement of the vertebral  body.  MRI - with burst fracture of C6 with body with posterior elements.   - Nondisplaced compression fractures of C7 and T1.  - Discontinuity of anterior and posterior longitudinal ligaments & ligamentum flavum consistent with injury.  - Discontinuity of ligamentum flavum at the level of C1-C2.   - There is increased T2 signal intensity concerning for ligamentous injury at this level.  - Abnormal T2 hyperintensity in the posterior paraspinal muscles likely representing contusion and edema.   - Mild amount of anterior prevertebral hematoma at the levels of C7, T1 and T2.  - Mild amount of posterior lateral epidural hemorrhage noted extending from the skull base to the level of C6.  - There is anterior epidural hemorrhage at C5 and C6.  - Level of C5-6 and C6-7, there is effacement of the dorsal and ventral subarachnoid spaces secondary to the posterior retropulsion of posterior fracture and epidural hemorrhage. Severe central canal stenosis at this level.   -  Moderate central canal stenosis at the levels of C2-3, C3-4 and C4-5 secondary to the posterior lateral epidural hemorrhage.  11/5 OR today for C6 corpectomy followed by C6/7 lami facetectomy and C5-T1 fusion.  11/9 Stage 2 posterior fusion, C5-T1  [place] collar  Phoenix Alvarez MD. Neurosurgeon. [facility].    Traumatic subdural hemorrhage (HCC)- (present on admission)  Assessment & Plan  Subdural blood tracking along the falx without significant adjacent mass effect.  Repeat interval head CT stable  TEG with platelet mapping with AA 72.4  No platelets given due to stable repeat CT   Non-operative management.  Post traumatic pharmacologic seizure prophylaxis for 1 week.  Speech Language Pathology cognitive evaluation. Recommend home health for continued speech therapy services  Phoenix Alvarez MD. Neurosurgeon. [facility].        Syncope and collapse- (present on admission)  Assessment & Plan  11/5 EKG: normal sinus rhythm   11/6 ECHO cardiogram with  normal left ventricular systolic function. Ejection fraction is visually estimated to be 60%.    DM2 (diabetes mellitus, type 2) ([facility])- (present on admission)  Assessment & Plan  Chronic condition treated with Metformin and Januvia.   Holding maintenance medications due to contrast administration and NPO status for staged surgical repairs.    Insulin sliding scale.    Traumatic closed nondisplaced fracture of proximal end of left humerus, initial encounter- (present on admission)  Assessment & Plan  CXR imaging with possible proximal left humerus fracture  11/6 Dedicated diagnostic imaging medial epicondyles lucency which may represent fracture versus unfused apophysis.    11/8 Distal humeral enthesophyte. No acute fracture or injury.WBAT  Weight bearing status - Weightbearing as tolerated OSCAR Roque MD. Orthopedic Surgeon. [facility].    Dyslipidemia- (present on admission)  Assessment & Plan  Chronic condition lovastatin.    11/6 Resume maintenance medication.    No contraindication to deep vein thrombosis (DVT) prophylaxis- (present on admission)  Assessment & Plan  Systemic anticoagulation contraindicated secondary to elevated bleeding risk.  11/6 Pharmacological DVT prophylaxis, Heparin, initiated.    Screening examination for infectious disease- (present on admission)  Assessment & Plan  Admission SARS-CoV-2 testing negative. LOW RISK patient. Repeat SARS-CoV-2 testing not indicated. Isolation precautions de-escalated.     Trauma- (present on admission)  Assessment & Plan  GLF.  Trauma Green Transfer Activation.  Jitendra Lee MD. Trauma Surgery.          Discussed patient condition with Patient and trauma surgery, Dr. Jitendra Lee.    I saw and evaluated the patient and discussed his management with the trauma APRN, Ishmael Pompa. I reviewed the APRNs note and agree with the documented findings and plan of care. On exam he is neurologically intact. Awaiting more surgery.    Jitendra  Rosa COHEN

## 2020-11-09 NOTE — DISCHARGE PLANNING
[facility] Transitional Care Coordination     Referral from:   Ishmael GARCIA  Facesheet indicates:  Medicare/[employer]  Potential Rehab Diagnosis:  GLF - Traumatic SAH and C6 fracture    Chart review indicates patient has on going medical management and therapy needs to possibly meet inpatient rehab facility criteria with the goal of returning to community.    D/C support:     Physiatry consultation forwarded per protocol.  GLF - fell and struck head.  PO day # 4 C6 corpectomy with anterior cervical fusion C5-T1.  OR today for C5-T1 posterior fusion.        Thank you for the referral.

## 2020-11-09 NOTE — ASSESSMENT & PLAN NOTE
Date of admission: 11/5/2020. Admitted to [facility].   Date: 11/9 Rehab consult   Cleared for discharge: No  Discharge delayed: No    Discharge date: TBD

## 2020-11-10 ENCOUNTER — APPOINTMENT (OUTPATIENT)
Dept: RADIOLOGY | Facility: MEDICAL CENTER | Age: 80
DRG: 453 | End: 2020-11-10
Attending: NEUROLOGICAL SURGERY
Payer: MEDICARE

## 2020-11-10 LAB
GLUCOSE BLD-MCNC: 191 MG/DL (ref 65–99)
GLUCOSE BLD-MCNC: 206 MG/DL (ref 65–99)
GLUCOSE BLD-MCNC: 206 MG/DL (ref 65–99)
GLUCOSE BLD-MCNC: 238 MG/DL (ref 65–99)
GLUCOSE BLD-MCNC: 239 MG/DL (ref 65–99)

## 2020-11-10 PROCEDURE — 700102 HCHG RX REV CODE 250 W/ 637 OVERRIDE(OP): Performed by: PHYSICIAN ASSISTANT

## 2020-11-10 PROCEDURE — 700102 HCHG RX REV CODE 250 W/ 637 OVERRIDE(OP): Performed by: NURSE PRACTITIONER

## 2020-11-10 PROCEDURE — 700111 HCHG RX REV CODE 636 W/ 250 OVERRIDE (IP): Performed by: PHYSICIAN ASSISTANT

## 2020-11-10 PROCEDURE — 700105 HCHG RX REV CODE 258: Performed by: PHYSICIAN ASSISTANT

## 2020-11-10 PROCEDURE — 97535 SELF CARE MNGMENT TRAINING: CPT

## 2020-11-10 PROCEDURE — A9270 NON-COVERED ITEM OR SERVICE: HCPCS | Performed by: PHYSICIAN ASSISTANT

## 2020-11-10 PROCEDURE — 94760 N-INVAS EAR/PLS OXIMETRY 1: CPT

## 2020-11-10 PROCEDURE — 97162 PT EVAL MOD COMPLEX 30 MIN: CPT

## 2020-11-10 PROCEDURE — 82962 GLUCOSE BLOOD TEST: CPT | Mod: 91

## 2020-11-10 PROCEDURE — 770001 HCHG ROOM/CARE - MED/SURG/GYN PRIV*

## 2020-11-10 PROCEDURE — A9270 NON-COVERED ITEM OR SERVICE: HCPCS | Performed by: NURSE PRACTITIONER

## 2020-11-10 PROCEDURE — 94640 AIRWAY INHALATION TREATMENT: CPT

## 2020-11-10 PROCEDURE — 92526 ORAL FUNCTION THERAPY: CPT

## 2020-11-10 PROCEDURE — 72040 X-RAY EXAM NECK SPINE 2-3 VW: CPT

## 2020-11-10 PROCEDURE — 94660 CPAP INITIATION&MGMT: CPT

## 2020-11-10 PROCEDURE — 97166 OT EVAL MOD COMPLEX 45 MIN: CPT

## 2020-11-10 RX ORDER — AMOXICILLIN 250 MG
1 CAPSULE ORAL NIGHTLY
Status: DISCONTINUED | OUTPATIENT
Start: 2020-11-10 | End: 2020-11-11 | Stop reason: HOSPADM

## 2020-11-10 RX ORDER — ALPRAZOLAM 0.5 MG/1
0.5 TABLET ORAL 3 TIMES DAILY PRN
Status: DISCONTINUED | OUTPATIENT
Start: 2020-11-10 | End: 2020-11-11 | Stop reason: HOSPADM

## 2020-11-10 RX ORDER — ACETAMINOPHEN 500 MG
1000 TABLET ORAL EVERY 4 HOURS PRN
Status: DISCONTINUED | OUTPATIENT
Start: 2020-11-10 | End: 2020-11-11 | Stop reason: HOSPADM

## 2020-11-10 RX ORDER — LABETALOL HYDROCHLORIDE 5 MG/ML
10 INJECTION, SOLUTION INTRAVENOUS
Status: DISCONTINUED | OUTPATIENT
Start: 2020-11-10 | End: 2020-11-11 | Stop reason: HOSPADM

## 2020-11-10 RX ORDER — METHOCARBAMOL 750 MG/1
750 TABLET, FILM COATED ORAL 4 TIMES DAILY
Status: DISCONTINUED | OUTPATIENT
Start: 2020-11-10 | End: 2020-11-11 | Stop reason: HOSPADM

## 2020-11-10 RX ORDER — KETOROLAC TROMETHAMINE 30 MG/ML
15 INJECTION, SOLUTION INTRAMUSCULAR; INTRAVENOUS EVERY 6 HOURS
Status: DISCONTINUED | OUTPATIENT
Start: 2020-11-10 | End: 2020-11-11 | Stop reason: HOSPADM

## 2020-11-10 RX ORDER — BISACODYL 10 MG
10 SUPPOSITORY, RECTAL RECTAL
Status: DISCONTINUED | OUTPATIENT
Start: 2020-11-10 | End: 2020-11-11 | Stop reason: HOSPADM

## 2020-11-10 RX ORDER — DIPHENHYDRAMINE HCL 25 MG
25 TABLET ORAL EVERY 6 HOURS PRN
Status: DISCONTINUED | OUTPATIENT
Start: 2020-11-10 | End: 2020-11-11 | Stop reason: HOSPADM

## 2020-11-10 RX ORDER — ENEMA 19; 7 G/133ML; G/133ML
1 ENEMA RECTAL
Status: DISCONTINUED | OUTPATIENT
Start: 2020-11-10 | End: 2020-11-11 | Stop reason: HOSPADM

## 2020-11-10 RX ORDER — CALCIUM CARBONATE 500 MG/1
500 TABLET, CHEWABLE ORAL EVERY MORNING
Status: DISCONTINUED | OUTPATIENT
Start: 2020-11-10 | End: 2020-11-11 | Stop reason: HOSPADM

## 2020-11-10 RX ORDER — AMOXICILLIN 250 MG
1 CAPSULE ORAL
Status: DISCONTINUED | OUTPATIENT
Start: 2020-11-10 | End: 2020-11-11 | Stop reason: HOSPADM

## 2020-11-10 RX ORDER — METFORMIN HYDROCHLORIDE 500 MG/1
1000 TABLET, EXTENDED RELEASE ORAL
Status: DISCONTINUED | OUTPATIENT
Start: 2020-11-10 | End: 2020-11-11 | Stop reason: HOSPADM

## 2020-11-10 RX ORDER — ONDANSETRON 4 MG/1
4 TABLET, ORALLY DISINTEGRATING ORAL EVERY 4 HOURS PRN
Status: DISCONTINUED | OUTPATIENT
Start: 2020-11-10 | End: 2020-11-11 | Stop reason: HOSPADM

## 2020-11-10 RX ORDER — DOCUSATE SODIUM 100 MG/1
100 CAPSULE, LIQUID FILLED ORAL 2 TIMES DAILY
Status: DISCONTINUED | OUTPATIENT
Start: 2020-11-10 | End: 2020-11-11 | Stop reason: HOSPADM

## 2020-11-10 RX ORDER — VITAMIN B COMPLEX
2000 TABLET ORAL EVERY MORNING
Status: DISCONTINUED | OUTPATIENT
Start: 2020-11-10 | End: 2020-11-11 | Stop reason: HOSPADM

## 2020-11-10 RX ORDER — ONDANSETRON 2 MG/ML
4 INJECTION INTRAMUSCULAR; INTRAVENOUS EVERY 4 HOURS PRN
Status: DISCONTINUED | OUTPATIENT
Start: 2020-11-10 | End: 2020-11-11 | Stop reason: HOSPADM

## 2020-11-10 RX ORDER — CEFAZOLIN SODIUM 2 G/100ML
2 INJECTION, SOLUTION INTRAVENOUS EVERY 8 HOURS
Status: COMPLETED | OUTPATIENT
Start: 2020-11-10 | End: 2020-11-10

## 2020-11-10 RX ORDER — POLYETHYLENE GLYCOL 3350 17 G/17G
1 POWDER, FOR SOLUTION ORAL 2 TIMES DAILY PRN
Status: DISCONTINUED | OUTPATIENT
Start: 2020-11-10 | End: 2020-11-11 | Stop reason: HOSPADM

## 2020-11-10 RX ORDER — DIPHENHYDRAMINE HYDROCHLORIDE 50 MG/ML
25 INJECTION INTRAMUSCULAR; INTRAVENOUS EVERY 6 HOURS PRN
Status: DISCONTINUED | OUTPATIENT
Start: 2020-11-10 | End: 2020-11-11 | Stop reason: HOSPADM

## 2020-11-10 RX ORDER — SODIUM CHLORIDE 9 MG/ML
INJECTION, SOLUTION INTRAVENOUS CONTINUOUS
Status: DISCONTINUED | OUTPATIENT
Start: 2020-11-10 | End: 2020-11-11 | Stop reason: HOSPADM

## 2020-11-10 RX ADMIN — FLUTICASONE PROPIONATE 88 MCG: 44 AEROSOL, METERED RESPIRATORY (INHALATION) at 08:15

## 2020-11-10 RX ADMIN — LEVETIRACETAM 500 MG: 500 TABLET ORAL at 17:45

## 2020-11-10 RX ADMIN — DEXAMETHASONE SODIUM PHOSPHATE 4 MG: 4 INJECTION, SOLUTION INTRA-ARTICULAR; INTRALESIONAL; INTRAMUSCULAR; INTRAVENOUS; SOFT TISSUE at 05:20

## 2020-11-10 RX ADMIN — UMECLIDINIUM BROMIDE AND VILANTEROL TRIFENATATE 1 PUFF: 62.5; 25 POWDER RESPIRATORY (INHALATION) at 08:15

## 2020-11-10 RX ADMIN — KETOROLAC TROMETHAMINE 15 MG: 30 INJECTION, SOLUTION INTRAMUSCULAR at 05:20

## 2020-11-10 RX ADMIN — ANTACID TABLETS 500 MG: 500 TABLET, CHEWABLE ORAL at 05:19

## 2020-11-10 RX ADMIN — LOVASTATIN 40 MG: 20 TABLET ORAL at 05:19

## 2020-11-10 RX ADMIN — DOCUSATE SODIUM 100 MG: 100 CAPSULE ORAL at 17:43

## 2020-11-10 RX ADMIN — DOCUSATE SODIUM 100 MG: 100 CAPSULE ORAL at 05:19

## 2020-11-10 RX ADMIN — DEXAMETHASONE SODIUM PHOSPHATE 4 MG: 4 INJECTION, SOLUTION INTRA-ARTICULAR; INTRALESIONAL; INTRAMUSCULAR; INTRAVENOUS; SOFT TISSUE at 12:11

## 2020-11-10 RX ADMIN — LEVETIRACETAM 500 MG: 500 TABLET ORAL at 05:20

## 2020-11-10 RX ADMIN — CEFAZOLIN SODIUM 2 G: 2 INJECTION, SOLUTION INTRAVENOUS at 10:04

## 2020-11-10 RX ADMIN — KETOROLAC TROMETHAMINE 15 MG: 30 INJECTION, SOLUTION INTRAMUSCULAR at 00:50

## 2020-11-10 RX ADMIN — INSULIN HUMAN 3 UNITS: 100 INJECTION, SOLUTION PARENTERAL at 08:55

## 2020-11-10 RX ADMIN — KETOROLAC TROMETHAMINE 15 MG: 30 INJECTION, SOLUTION INTRAMUSCULAR at 12:10

## 2020-11-10 RX ADMIN — METFORMIN HYDROCHLORIDE 1000 MG: 500 TABLET, EXTENDED RELEASE ORAL at 17:43

## 2020-11-10 RX ADMIN — THERA TABS 1 TABLET: TAB at 05:20

## 2020-11-10 RX ADMIN — DOCUSATE SODIUM 50 MG AND SENNOSIDES 8.6 MG 1 TABLET: 8.6; 5 TABLET, FILM COATED ORAL at 21:42

## 2020-11-10 RX ADMIN — SITAGLIPTIN 100 MG: 100 TABLET, FILM COATED ORAL at 05:20

## 2020-11-10 RX ADMIN — CEFAZOLIN SODIUM 2 G: 2 INJECTION, SOLUTION INTRAVENOUS at 02:52

## 2020-11-10 RX ADMIN — INSULIN HUMAN 3 UNITS: 100 INJECTION, SOLUTION PARENTERAL at 12:11

## 2020-11-10 RX ADMIN — Medication 2000 UNITS: at 05:20

## 2020-11-10 RX ADMIN — INSULIN HUMAN 3 UNITS: 100 INJECTION, SOLUTION PARENTERAL at 21:41

## 2020-11-10 RX ADMIN — METHOCARBAMOL 750 MG: 750 TABLET ORAL at 21:42

## 2020-11-10 RX ADMIN — SODIUM CHLORIDE: 9 INJECTION, SOLUTION INTRAVENOUS at 12:19

## 2020-11-10 RX ADMIN — INSULIN HUMAN 2 UNITS: 100 INJECTION, SOLUTION PARENTERAL at 17:50

## 2020-11-10 RX ADMIN — METHOCARBAMOL 750 MG: 750 TABLET ORAL at 09:01

## 2020-11-10 RX ADMIN — KETOROLAC TROMETHAMINE 15 MG: 30 INJECTION, SOLUTION INTRAMUSCULAR at 17:43

## 2020-11-10 RX ADMIN — SODIUM CHLORIDE: 9 INJECTION, SOLUTION INTRAVENOUS at 00:50

## 2020-11-10 RX ADMIN — FLUTICASONE PROPIONATE 88 MCG: 44 AEROSOL, METERED RESPIRATORY (INHALATION) at 21:25

## 2020-11-10 ASSESSMENT — COGNITIVE AND FUNCTIONAL STATUS - GENERAL
MOVING TO AND FROM BED TO CHAIR: A LITTLE
HELP NEEDED FOR BATHING: A LOT
WALKING IN HOSPITAL ROOM: A LITTLE
MOVING FROM LYING ON BACK TO SITTING ON SIDE OF FLAT BED: A LITTLE
MOBILITY SCORE: 18
STANDING UP FROM CHAIR USING ARMS: A LITTLE
TURNING FROM BACK TO SIDE WHILE IN FLAT BAD: A LITTLE
DRESSING REGULAR UPPER BODY CLOTHING: A LITTLE
TOILETING: A LITTLE
SUGGESTED CMS G CODE MODIFIER DAILY ACTIVITY: CK
DRESSING REGULAR LOWER BODY CLOTHING: A LITTLE
DAILY ACTIVITIY SCORE: 19
SUGGESTED CMS G CODE MODIFIER MOBILITY: CK
CLIMB 3 TO 5 STEPS WITH RAILING: A LITTLE

## 2020-11-10 ASSESSMENT — ENCOUNTER SYMPTOMS
SPEECH CHANGE: 0
MYALGIAS: 1
STRIDOR: 0
NAUSEA: 0
CHILLS: 0
FEVER: 0
SHORTNESS OF BREATH: 0
DOUBLE VISION: 0
FOCAL WEAKNESS: 0
VOMITING: 0
BACK PAIN: 0
NECK PAIN: 1
ABDOMINAL PAIN: 0
SENSORY CHANGE: 0

## 2020-11-10 ASSESSMENT — PAIN DESCRIPTION - PAIN TYPE
TYPE: ACUTE PAIN;SURGICAL PAIN
TYPE: ACUTE PAIN;SURGICAL PAIN

## 2020-11-10 ASSESSMENT — GAIT ASSESSMENTS
GAIT LEVEL OF ASSIST: SUPERVISED
ASSISTIVE DEVICE: FRONT WHEEL WALKER
DEVIATION: BRADYKINETIC
DISTANCE (FEET): 250

## 2020-11-10 ASSESSMENT — ACTIVITIES OF DAILY LIVING (ADL): TOILETING: INDEPENDENT

## 2020-11-10 NOTE — PROGRESS NOTES
"Two Pre-Op staff members arrived at bedside to transport patient to pre-op via hospital bed. [facility] bath and Pre-Op checklist complete. Report given to pre-op RN. Patient is A+O x 4 and understands plan of care and indications for surgery.    /91   Pulse 73   Temp 36.3 °C (97.4 °F) (Temporal)   Resp 17   Ht 1.727 m (5' 8\")   Wt 99.2 kg (218 lb 11.1 oz)   SpO2 94%     "

## 2020-11-10 NOTE — ANESTHESIA PREPROCEDURE EVALUATION
Relevant Problems   CARDIAC   (+) Injury of vertebral artery, right, initial encounter      ENDO   (+) DM2 (diabetes mellitus, type 2) (HCC)      Other   (+) C6 cervical fracture (HCC)   (+) Oropharyngeal dysphagia   (+) Syncope and collapse   (+) Traumatic closed nondisplaced fracture of proximal end of left humerus, initial encounter   (+) Traumatic subdural hemorrhage (HCC)   HTN  COPD  Prostate cancer    Physical Exam    Airway   Mallampati: III  TM distance: >3 FB  Neck ROM: full       Cardiovascular - normal exam  Rhythm: regular  Rate: normal  (-) murmur     Dental - normal exam  (+) upper dentures, lower dentures           Pulmonary - normal exam  Breath sounds clear to auscultation     Abdominal    Neurological - normal exam               Anesthesia Plan    ASA 3   ASA physical status 3 criteria: COPD    Plan - general       Airway plan will be ETT        Induction: intravenous    Postoperative Plan: Postoperative administration of opioids is intended.    Pertinent diagnostic labs and testing reviewed    Informed Consent:    Anesthetic plan and risks discussed with patient.    Use of blood products discussed with: patient whom consented to blood products.

## 2020-11-10 NOTE — THERAPY
Physical Therapy   Initial Evaluation     Patient Name: Isaias Ortiz  Age:  80 y.o., Sex:  male  Medical Record #: [id number]  Today's Date: 11/10/2020     Precautions: Fall Risk, Swallow Precautions ( See Comments), Cervical Collar  , Spinal / Back Precautions (HOB >45degrees AAT)    Assessment  Patient is 80 y.o. male admitted on 11/5/20 after GLF. Pt is s/p C4-T1 posterior fusion with laminectomy, POD#1 for stage II.  Pt presents today with good pain control t/o tx, however SpO2 81% on RA following ambulation x 250' in ferrera, recovered to 93% on 2.0 lpm after 1.5 min seated rest and pursed-lip breathing. PT will follow during acute stay. Anticipate pt will be appropriate for home with spouse assist at discharge.    Plan    Recommend Physical Therapy 4 times per week until therapy goals are met for the following treatments:  Bed Mobility, Equipment, Gait Training, Neuro Re-Education / Balance, Stair Training, Therapeutic Activities and Therapeutic Exercises    DC Equipment Recommendations: None  Discharge Recommendations: Recommend home health for continued physical therapy services       Subjective    Pt agrees to PT.     Objective       11/10/20 0927   Prior Living Situation   Prior Services Home-Independent   Housing / Facility 1 Story House   Steps Into Home 2   Steps In Home 0   Equipment Owned Front-Wheel Walker   Lives with - Patient's Self Care Capacity Spouse   Comments Spouse can assist at discharge   Prior Level of Functional Mobility   Bed Mobility Independent   Transfer Status Independent   Ambulation Independent   Distance Ambulation (Feet) 600   Assistive Devices Used None   Stairs Independent   History of Falls   History of Falls Yes   Date of Last Fall 11/04/20   Cognition    Cognition / Consciousness WDL   Orientation Level Oriented x 4   Level of Consciousness Alert   Passive ROM Lower Body   Passive ROM Lower Body WDL   Active ROM Lower Body    Active ROM Lower Body  WDL   Strength Lower Body   Lower  Body Strength  WDL   Sensation Lower Body   Lower Extremity Sensation   WDL   Lower Body Muscle Tone   Lower Body Muscle Tone  WDL   Coordination Lower Body    Coordination Lower Body  WDL   Balance Assessment   Sitting Balance (Static) Fair +   Sitting Balance (Dynamic) Fair   Standing Balance (Static) Fair +   Standing Balance (Dynamic) Fair   Weight Shift Sitting Fair   Weight Shift Standing Fair   Comments with FWW   Gait Analysis   Gait Level Of Assist Supervised   Assistive Device Front Wheel Walker   Distance (Feet) 250   # of Times Distance was Traveled 1   Deviation Bradykinetic   # of Stairs Climbed 0   Weight Bearing Status FWB   Bed Mobility    Comments Pt up in chair upon arrival   Functional Mobility   Sit to Stand Minimal Assist   Bed, Chair, Wheelchair Transfer Minimal Assist   Transfer Method Stand Step   Mobility gait in ferrera   Comments Assist for B UE placement and use during STS   Activity Tolerance   Standing 15 min total   Patient / Family Goals    Patient / Family Goal #1 Play golf again   Short Term Goals    Short Term Goal # 1 Pt will be SPV for up/down 2 steps without rail in 6 txs to be able to access his home safely.   Short Term Goal # 2 Pt will be SPV for gait >500' with FWW in 6 txs to improve functional mobility.   Education Group   Cervical Precautions Patient Response Patient;Acceptance;Explanation;Demonstration;Handout;Action Demonstration;Verbal Demonstration   Role of Physical Therapist Patient Response Patient;Acceptance;Explanation;Action Demonstration   Problem List    Problems Impaired Transfers;Impaired Ambulation;Impaired Balance;Decreased Activity Tolerance   Anticipated Discharge Equipment and Recommendations   DC Equipment Recommendations None   Discharge Recommendations Recommend home health for continued physical therapy services

## 2020-11-10 NOTE — OR SURGEON
Immediate Post OP Note    PreOp Diagnosis: Cervical Fracture/sblxation with Instability and Myelopathy    PostOp Diagnosis: Same    Procedure(s):  FUSION, SPINE, CERVICAL, POSTERIOR APPROACH- STAGE 2 C4-T1 - Wound Class: Clean with Drain  LAMINECTOMY, SPINE, CERVICAL, POSTERIOR APPROACH- C4-T1 - Wound Class: Clean with Drain    Surgeon(s):  Phoenix Alvarez III, M.D.    Anesthesiologist/Type of Anesthesia:  Anesthesiologist: Dhruv Pineda M.D./General    Surgical Staff:  Assistant: Flaquito Beltran P.A.-C.  Circulator: Aisha Helms RGilbertoNGilberto  Relief Circulator: Kyle Smith RGilbertoN.; Lupis Santa RGilbertoNGilberto  Relief Scrub: Neeraj Rick  Scrub Person: Mikael Ross  Radiology Technologist: Jg Reynoso    Specimens removed if any:  * No specimens in log *    Estimated Blood Loss: 50 cc    Findings: Significant bruising and instability.      Complications:  CSF Leak--repaired-- Will need HOB elevated 45 degrees continuously for next 48 hours and must wear collar at all times except when eating.        11/9/2020 10:07 PM Flaquito Beltran P.A.-C.

## 2020-11-10 NOTE — PROGRESS NOTES
Trauma / Surgical Daily Progress Note    Date of Service  11/10/2020    Chief Complaint  80 y.o. male admitted 11/5/2020 with C6 fracture    Interval Events    PO day # 5 OR for C6 corpectomy followed by C6/7 lami facetectomy and C5-T1 fusion.  PO day # 1 Stage 2 posterior fusion, C5-T1  Up to chair. No focal neurological deficits.  No difficulty swallowing. No respiratory compromisr    - Speech for repeat swallow evaluation.   - PT/OT  - Isolated spine injury. Pre-hospital combordities addressed.  Trauma services to sign off. Please reconsult should the need arise. Discussed with with S[facility]  - Counseled     Review of Systems  Review of Systems   Constitutional: Negative for chills and fever.   HENT: Negative for hearing loss.         No difficulty swallowing   Eyes: Negative for double vision.   Respiratory: Negative for shortness of breath and stridor.         Denies stridor   Cardiovascular: Negative for chest pain.   Gastrointestinal: Negative for abdominal pain, nausea and vomiting.   Genitourinary: Negative for dysuria.   Musculoskeletal: Positive for myalgias and neck pain. Negative for back pain and joint pain.   Neurological: Negative for sensory change, speech change and focal weakness.        Vital Signs  Temp:  [35.8 °C (96.5 °F)-37.5 °C (99.5 °F)] 36.6 °C (97.9 °F)  Pulse:  [72-98] 77  Resp:  [13-20] 18  BP: (128-168)/() 155/85  SpO2:  [90 %-99 %] 97 %    Physical Exam  Physical Exam  Vitals signs and nursing note reviewed.   Constitutional:       General: He is awake. He is not in acute distress.     Appearance: He is not ill-appearing, toxic-appearing or diaphoretic.      Interventions: Cervical collar and nasal cannula in place.   HENT:      Head: Normocephalic.      Mouth/Throat:      Mouth: Mucous membranes are moist.      Pharynx: Oropharynx is clear.   Eyes:      Extraocular Movements: Extraocular movements intact.      Conjunctiva/sclera: Conjunctivae normal.   Neck:       Musculoskeletal: Muscular tenderness present.      Comments: Anterior / Posterior cervical incisions dressed.  No hematoma or drainage.    Cardiovascular:      Rate and Rhythm: Normal rate and regular rhythm.      Pulses: Normal pulses.   Pulmonary:      Effort: Pulmonary effort is normal.      Breath sounds: Normal breath sounds.      Comments: No stridor  Chest:      Chest wall: No tenderness.   Abdominal:      General: There is no distension.      Tenderness: There is no abdominal tenderness. There is no guarding.   Musculoskeletal:      Comments: Moves all extremities    Skin:     General: Skin is warm and dry.      Capillary Refill: Capillary refill takes less than 2 seconds.      Findings: No rash.   Neurological:      General: No focal deficit present.      Mental Status: He is alert.      GCS: GCS eye subscore is 4. GCS verbal subscore is 5. GCS motor subscore is 6.      Cranial Nerves: No cranial nerve deficit.      Sensory: No sensory deficit.   Psychiatric:         Mood and Affect: Mood normal.         Behavior: Behavior normal. Behavior is cooperative.         Thought Content: Thought content normal.         Judgment: Judgment normal.         Laboratory  Recent Results (from the past 24 hour(s))   ACCU-CHEK GLUCOSE    Collection Time: 11/09/20 12:22 PM   Result Value Ref Range    Glucose - Accu-Ck 171 (H) 65 - 99 mg/dL   ACCU-CHEK GLUCOSE    Collection Time: 11/09/20  6:05 PM   Result Value Ref Range    Glucose - Accu-Ck 149 (H) 65 - 99 mg/dL   ACCU-CHEK GLUCOSE    Collection Time: 11/10/20 12:49 AM   Result Value Ref Range    Glucose - Accu-Ck 238 (H) 65 - 99 mg/dL       Fluids    Intake/Output Summary (Last 24 hours) at 11/10/2020 0748  Last data filed at 11/10/2020 0400  Gross per 24 hour   Intake 2500 ml   Output 1298 ml   Net 1202 ml       Core Measures & Quality Metrics  Labs reviewed, EKG reviewed, Medications reviewed and Radiology images reviewed  Cruz catheter: No Cruz      DVT Prophylaxis:  Heparin  DVT prophylaxis - mechanical: SCDs  Ulcer prophylaxis: Not indicated  Antibiotics: Treating active infection/contamination beyond 24 hours perioperative coverage  Assessed for rehab: Patient returned to prior level of function, rehabilitation not indicated at this time    RAP Score Total: 9    ETOH Screening  CAGE Score: 0  Assessment complete date: 11/6/2020 (ODALYS negative. No intervention warrented)        Assessment/Plan  Discharge planning issues- (present on admission)  Assessment & Plan  Date of admission: 11/5/2020. Admitted to [facility].   Date: 11/9 Rehab consult   Cleared for discharge: No  Discharge delayed: No    Discharge date: TBD    Oropharyngeal dysphagia- (present on admission)  Assessment & Plan  Likely related to anterior cervical repair.   11/8  New onset of difficulty swallowing. 4mg IV decadron q6h x2 doses given per neurosurgery.   - Speech language pathology for swallow evaluation.  Recommend NPO.      Injury of vertebral artery, right, initial encounter- (present on admission)  Assessment & Plan  11/5 MRI There is nonvisualization of the flow void of the right vertebral artery. This is uncertain whether this is a chronic finding or secondary to the vertebral dissection.   11/6 CTA   - right vertebral artery is patent though this small in size throughout its course no obvious dissection flap identified. The small size likely due to normal developmental anatomy though diffuse narrowing from dissection difficult to entirely exclude.   - The left vertebral artery is patent and appears dominant.  Non-operative management.  Phoenix Alvarez MD. Neurosurgeon. [facility].       C6 cervical fracture (HCC)- (present on admission)  Assessment & Plan  C6 vertebral body fracture with 6mm of retropulsion posteriorly resulting in severe spinal canal narrowing with involvement of pedicles with posterior displacement of the vertebral body.  MRI - with burst fracture of C6 with body with posterior  elements.   - Nondisplaced compression fractures of C7 and T1.  - Discontinuity of anterior and posterior longitudinal ligaments & ligamentum flavum consistent with injury.  - Discontinuity of ligamentum flavum at the level of C1-C2.   - There is increased T2 signal intensity concerning for ligamentous injury at this level.  - Abnormal T2 hyperintensity in the posterior paraspinal muscles likely representing contusion and edema.   - Mild amount of anterior prevertebral hematoma at the levels of C7, T1 and T2.  - Mild amount of posterior lateral epidural hemorrhage noted extending from the skull base to the level of C6.  - There is anterior epidural hemorrhage at C5 and C6.  - Level of C5-6 and C6-7, there is effacement of the dorsal and ventral subarachnoid spaces secondary to the posterior retropulsion of posterior fracture and epidural hemorrhage. Severe central canal stenosis at this level.   -  Moderate central canal stenosis at the levels of C2-3, C3-4 and C4-5 secondary to the posterior lateral epidural hemorrhage.  11/5 OR for C6 corpectomy followed by C6/7 lami facetectomy and C5-T1 fusion.  11/9 Stage 2 posterior fusion, C5-T1  [place]ly collar  Phoenix Alvarez MD. Neurosurgeon. [facility].    Traumatic subdural hemorrhage (HCC)- (present on admission)  Assessment & Plan  Subdural blood tracking along the falx without significant adjacent mass effect.  Repeat interval head CT stable  TEG with platelet mapping with AA 72.4  No platelets given due to stable repeat CT   Non-operative management.  Post traumatic pharmacologic seizure prophylaxis for 1 week.  Speech Language Pathology cognitive evaluation. Recommend home health for continued speech therapy services  Phoenix Alvarez MD. Neurosurgeon. [facility].          Syncope and collapse- (present on admission)  Assessment & Plan  11/5 EKG: normal sinus rhythm   11/6 ECHO cardiogram with normal left ventricular systolic function. Ejection fraction is  visually estimated to be 60%.    DM2 (diabetes mellitus, type 2) ([facility])- (present on admission)  Assessment & Plan  Chronic condition treated with Metformin and Januvia.   Maintenance medications resumed   Insulin sliding scale.    Traumatic closed nondisplaced fracture of proximal end of left humerus, initial encounter- (present on admission)  Assessment & Plan  CXR imaging with possible proximal left humerus fracture  11/6 Dedicated diagnostic imaging medial epicondyles lucency which may represent fracture versus unfused apophysis.    11/8 Distal humeral enthesophyte. No acute fracture or injury.WBAT  Weight bearing status - Weightbearing as tolerated OSCAR Roque MD. Orthopedic Surgeon. [facility].     Dyslipidemia- (present on admission)  Assessment & Plan  Chronic condition lovastatin.     11/6 Resume maintenance medication.    No contraindication to deep vein thrombosis (DVT) prophylaxis- (present on admission)  Assessment & Plan  Systemic anticoagulation contraindicated secondary to elevated bleeding risk.  11/6 Pharmacological DVT prophylaxis, Heparin, initiated.     Screening examination for infectious disease- (present on admission)  Assessment & Plan  Admission SARS-CoV-2 testing negative. LOW RISK patient. Repeat SARS-CoV-2 testing not indicated. Isolation precautions de-escalated.    Trauma- (present on admission)  Assessment & Plan  GLF.  Trauma Green Transfer Activation.  Jitendra Lee MD. Trauma Surgery.         Discussed patient condition with Patient and trauma surgery, Dr. Jitendra Lee .    I saw and evaluated the patient and discussed his management with the trauma APRN, Ishmael Pompa. I reviewed the APRNs note and agree with the documented findings and plan of care.  On exam he remains neurologically intact after his second cervical fusion procedure.  Trauma services will sign off.    Jitendra Lee MD

## 2020-11-10 NOTE — PROGRESS NOTES
Neurosurgery Progress Note    Subjective:  Postop day 5 C6 corpectomy with anterior cervical fusion C5-T1 for C6 burst fracture  Postop day 1 C5-T1 posterior fusion      Reports today minimal neck pain  Denies radicular arm pain  Speech has made her NPO and recommending follow up studies today  States pain is much improved since before surgery   Aspen collar worn     Exam:  Anterior cervical clean dry and intact, no evidence of hematoma or seroma  Posterior cervical with drain in place, no swelling appreciated  Motor strength 5/5  Sensation intact  Voice slightly hoarse.     BP  Min: 128/75  Max: 168/82  Pulse  Av.1  Min: 72  Max: 98  Resp  Av.3  Min: 13  Max: 20  Temp  Av.4 °C (97.5 °F)  Min: 35.8 °C (96.5 °F)  Max: 37.5 °C (99.5 °F)  SpO2  Av.3 %  Min: 90 %  Max: 99 %    No data recorded    Recent Labs     20  0802   WBC 12.0*   RBC 4.24*   HEMOGLOBIN 12.4*   HEMATOCRIT 38.1*   MCV 89.9   MCH 29.2   MCHC 32.5*   RDW 46.8   PLATELETCT 173   MPV 10.7     Recent Labs     20  0802   SODIUM 136   POTASSIUM 4.2   CHLORIDE 100   CO2 25   GLUCOSE 196*   BUN 19   CREATININE 0.98   CALCIUM 8.7     Recent Labs     20  0720   INR 1.09           Intake/Output       20 0700 - 11/10/20 0659 11/10/20 07 - 20 0659      1900-0659 Total 1900-0659 Total       Intake    I.V.  --  2500 2500  --  -- --    Volume (mL) (Lactated Ringers) -- 2500 2500 -- -- --    Total Intake -- 2500 2500 -- -- --       Output    Urine  --  1100 1100  --  -- --    Number of Times Voided 2 x 2 x 4 x -- -- --    Urine Void (mL) -- 1100 1100 -- -- --    Drains  --  48 48  --  -- --    Output (mL) (Closed/Suction Drain 1 Posterior Back Kody Johnson 7 Fr.) -- 48 48 -- -- --    Stool  --  -- --  --  -- --    Number of Times Stooled 1 x -- 1 x -- -- --    Blood  --  150 150  --  -- --    Est. Blood Loss -- 150 150 -- -- --    Total Output -- 0580 5713 -- -- --       Net I/O     -- 5672 8027  -- -- --            Intake/Output Summary (Last 24 hours) at 11/10/2020 0755  Last data filed at 11/10/2020 0400  Gross per 24 hour   Intake 2500 ml   Output 1298 ml   Net 1202 ml            • calcium carbonate  500 mg QAM   • vitamin D  2,000 Units QAM   • SITagliptin  100 mg DAILY   • metFORMIN ER  1,000 mg PM MEAL   • Pharmacy Consult Request  1 Each PHARMACY TO DOSE   • docusate sodium  100 mg BID   • senna-docusate  1 Tab Nightly   • senna-docusate  1 Tab Q24HRS PRN   • polyethylene glycol/lytes  1 Packet BID PRN   • magnesium hydroxide  30 mL QDAY PRN   • bisacodyl  10 mg Q24HRS PRN   • fleet  1 Each Once PRN   • NS   Continuous   • [START ON 11/11/2020] enoxaparin  40 mg DAILY AT 1800   • ketorolac  15 mg Q6HRS   • acetaminophen  1,000 mg Q4HRS PRN   • ceFAZolin  2 g Q8HR   • diphenhydrAMINE  25 mg Q6HRS PRN    Or   • diphenhydrAMINE  25 mg Q6HRS PRN   • ondansetron  4 mg Q4HRS PRN   • ondansetron  4 mg Q4HRS PRN   • methocarbamol  750 mg 4X/DAY   • ALPRAZolam  0.5 mg TID PRN   • labetalol  10 mg Q HOUR PRN   • benzocaine-menthol  1 Lozenge Q2HRS PRN   • dexamethasone  4 mg Q6HRS   • albuterol  2 Puff Q6HRS PRN   • multivitamin  1 Tab QAM   • lovastatin  40 mg DAILY   • umeclidinium-vilanterol  1 Puff QDAILY (RT)    And   • fluticasone  2 Puff Q12HRS (RT)   • Respiratory Therapy Consult   Continuous RT   • acetaminophen  650 mg Q6HRS   • levETIRAcetam  500 mg BID   • insulin regular  2-9 Units 4X/DAY ACHS    And   • glucose  16 g Q15 MIN PRN    And   • dextrose 50%  50 mL Q15 MIN PRN   • Pharmacy Consult Request  1 Each PHARMACY TO DOSE   • MD ALERT...DO NOT ADMINISTER NSAIDS or ASPIRIN unless ORDERED By Neurosurgery  1 Each PRN   • senna-docusate  1 Tab Q24HRS PRN   • fleet  1 Each Once PRN   • methocarbamol  750 mg Q8HRS PRN   • oxyCODONE immediate-release  2.5 mg Q3HRS PRN       Assessment and Plan:  POD #5 C6 corpectomy with anterior cervical fusion C5-T1 for C6 burst fracture  POD #1 C5-T1 posterior  fusion     Leave drain in place until tomorrow   Speech to re-evaluate patient for diet recommendations  OK to advance diet  Work with PT/OT  Continue O2 as needed  Maintain adequate hydration  Decadron 4mg q6h added   Keep head of bed greater than 45 degrees.  Likely DC home tomorrow or the next day if patient's dysphagia improved

## 2020-11-10 NOTE — THERAPY
Missed Therapy     Patient Name: Isaias Ortiz  Age:  80 y.o., Sex:  male  Medical Record #: [id number]  Today's Date: 11/10/2020       11/10/20 0736   Interdisciplinary Plan of Care Collaboration   Collaboration Comments PT eval on hold as pt has active bedrest orders.

## 2020-11-10 NOTE — ANESTHESIA PROCEDURE NOTES
Peripheral IV    Date/Time: 11/9/2020 6:41 PM  Performed by: Dhruv Pineda M.D.  Authorized by: Dhruv Pineda M.D.     Size:  16 G  Laterality:  Left  Site Prep:  Alcohol  Technique:  Direct puncture

## 2020-11-10 NOTE — OP REPORT
DATE OF SERVICE:  11/09/2020    PREOPERATIVE DIAGNOSES:  1. Cervical spondylosis.  2. Cervical stenosis.  3. Closed C6 cervical fracture.  4. Cervical instability.  5. Status post C6 corpectomy and anterior fusion several days ago by myself.    POSTOPERATIVE DIAGNOSES:  1. Cervical spondylosis.  2. Cervical stenosis.  3. Closed C6 cervical fracture.  4. Cervical instability.  5. Status post C6 corpectomy and anterior fusion several days ago by myself.    PROCEDURES PERFORMED:  1. C4, C5, C6, C7, T1 laminectomy, decompression of thecal sac and nerve   roots.  2. C4, C5, C6, T1 lateral mass and pedicle screw fixation.  3. C4, C5, C6, C7, T1 posterolateral allograft autograft alexa fusion.  4. Intraoperative monitoring.    SURGEON:  Phoenix Alvarez MD    ASSISTANT:  Jerry Beltran PA-C    ANESTHESIA:  General.    COMPLICATIONS:  None.    ESTIMATED BLOOD LOSS:  150 mL.    FINDINGS:  Well decompressed thecal sac.  No change in SSEPs.    DISPOSITION:  Extubated to recovery and to floor.    HISTORY OF PRESENT ILLNESS:  A 80-year-old man who had a C6 corpectomy for   anterior fusion, had a complex 3-column fracture.  It was mostly aligned after   this, but we decided we needed to continue posterior decompression due to the   congenital stenosis that predated the fracture and some still retropulsed   bone laterally at C6.  I explained the risks, benefits, and alternatives of   posterior decompression and fusion including pain, infection, bleeding, CSF   leak, failure to completely resolve symptoms, neurologic deficit, weakness,   numbness, bladder or bowel difficulties, failure of fixation, failure of   fusion, need for rostral caudal extensions due to junctional stenosis.  He   understood the risks, benefits, and agreed to consent.    SUMMARY OF OPERATIVE PROCEDURE:  The patient was brought to the operating   suite, placed under general anesthesia in a regular OR bed supine, placed in   [facility] fixation.   Anesthesia will keep the MAPs greater than 80   during the case.  Motors and SSEPs were run at baseline, had good signals.    The patient was flipped in the prone position, arms in  tuck on a   regular OR bed with chest rolls and fixed in anatomic position to the bed.    Preoperative antibiotics were given.  Proper time-out was performed.  The   patient was prepped and draped in sterile fashion after drawing out a linear   incision from C4 to T1.  A linear incision was made and soft tissues dissected   with bipolar and monopolar electrocautery.  We found the dorsal fascia,   incised it sharply and noting a lot of bruised muscle tissue and ligamentous   damage from the fall.  We used Aquamantys bipolar to get us down to the lateral masses of C4, C5,   C6, C7 and T1.  Copious bleeding was noted.  There was still some   hypermobility noted in the spine.  We got hemostasis and turned our attention   to decompression.    C4, C5, C6, C7, T1 laminectomy, decompression of thecal sac and nerve roots:    We used a Leksell rongeur and Valentine bone cutter to remove the spinous   process of C5, C6, and C7 and the rostral part of T1.  We used Midas Chuck   drill, drilled off the lamina widely at C5, C6, C7 and T1 and we used sweeping   of the Flory dental and cleared away the rest of soft tissue with a   Kerrison rongeur.  While prepping the area for getting ready for the lateral   masses, we are working with the Bovie along the right C4-C5 area and CSF was   noted.  This was odd, but it appeared that this was a Bovie CSF injury.  We   then completed a C4 laminectomy in order to isolate as well as out into the   lateral masses a little bit to make sure we had a good area to sew.  We used   two 6-0 Kevil-King stitches and 2 muscle plugs.  We saw a small amount of CSF   leaking afterwards, after the primary repair, so we put down some blue glue   and we are going to encourage him to be upright at least 45 degrees for at least  3   days and we will put the drain only to suction.  We then turned our attention   to the lateral mass fixation.    C4, C5, C6, T1 lateral mass with pedicle screw fixation:  Using the Streamline   RTI System, in the appropriate downward to upper trajectory, we cannulated   the lateral masses of C4, C5, C6, confirmed bony confines with a Gastelum probe   and bilaterally at C4, C5 and the right C6, we placed 3.5x14 mm screws with   good bony purchase.  The left C6 was a 12x3.5 lateral mass screw.  To   cannulate the pedicles, after palpating the medial pedicle, we drilled a very   small corticectomy.  We used a Lenke probe to get down to 26 mm deep,   confirmed bony confines with a Gastelum probe, placed radiographic markers and   took AP shots to confirm we were passing through the pedicles.  We then   undertapped and we placed 4.0x26 mm screws with good bony purchase.  We were   happy with our final construct and x-rays.    C4, C5, C6, C7, T1 posterolateral allograft autograft alexa fusion:  We   decorticated the facet joints and lateral masses of C7, facet joints of C4-C5,   C5-C6, C6-C7, lateral mass of C7 and the transverse process of T1                   the mixture of patient's morselized autograft bone with additional   Progenix Plus allograft for onlay fusion supplemented with a 3.5 titanium   alloy alexa, laid neutrally in the setscrews using manufacture torque device to   tighten the locking caps.  We were happy with our final construct.  The motors   had dropped out MCFP through the case but SSEPs did not change   and EMG did not work well.  I therefore did stimulate the T1 screws.    We copiously irrigated with bacitracin and saline and injected the muscle with   Marcaine.  We tunneled out a 7 mm flat fluted LEV and secured to skin with a   stitch and left to gravity only.  We closed the wound in anatomic layers, 0   Vicryl for muscle, 0 Vicryl for the fascia, 2-0 Vicryl for the dermis, and   staples for the skin.   Sterile dressing was applied.  The patient was rolled   from prone to supine, removed from [place] headholder without incident and   extubated.    There were no complications.  Needle and sponge count correct at the end of   the case.             ____________________________________     MD KATHRYN Alvarado III / FATOUMATA    DD:  11/09/2020 21:17:01  DT:  11/09/2020 22:30:35    D#:  [id number]  Job#:  [id number]

## 2020-11-10 NOTE — ANESTHESIA PROCEDURE NOTES
Arterial Line  Performed by: Dhruv Pineda M.D.  Authorized by: Dhruv Pineda M.D.     Start Time:  11/9/2020 6:25 PM  End Time:  11/9/2020 6:27 PM  Localization: ultrasound guidance and surface landmarks    Patient Location:  OR  Indication: continuous blood pressure monitoring        Catheter Size:  20 G  Seldinger Technique?: Yes    Laterality:  Left  Site:  Radial artery  Line Secured:  Antimicrobial disc, tape and transparent dressing  Events: patient tolerated procedure well with no complications

## 2020-11-10 NOTE — RESPIRATORY CARE
Oxygen Rounds      Patient found on    O2 L/m:  __2_______    Oxygen device:  ___SNC_____   Spo2: ______95___%        Respiratory device skin site inspection completed.

## 2020-11-10 NOTE — OR NURSING
2138 Pt arrived to PACU from OR. Report received from OR RN and Dr. Pineda, anesthesiologist.    Pt has nasal and oral airway in. Pt needed jaw thrust done. Dr. Pineda at bedside.    2150 Dr. Pineda and Dr. Alvarez at bedside checking on Pt. Pt still sleeping very deeply and unable to arouse with verbal and tactile stimuli.    2225 Pt still has nasal and oral airway. SHANNON Goel., still holding jaw thrust. Dr. Alvarez and Dr. Pineda left and notified this RN to call if needed.    2234 Pt waking up more. Nasal airway d/c'd.     2240 Oral airway d/c'd.    2255 Dr. Pineda called and updated regarding Pt still unable to breath deeply and control oral secretions. New order received to give racepinephrine nebulizer. MD recommend to keep Pt in PACU until Pt wakes up more.    2310 Paged PA and VIOLETA Mcgrath called back. PA updated regarding Pt's status. New order to give decadron 4mg iv every 6 hours times 3 doses.    2353 Pt's wife, Raymond, updated regarding Pt's status and plan of care.    2355 Pt able to move all extremities. Pt AA/Ox4. VSS. Dressing to posterior neck, CDI. Intact LEV drain to posterior neck, drained 8cc serosanguinous drainage. Pt reports minimal pain at this time. Pt denies numbness or tingling. No nausea or vomiting. Pt voided 500cc yellow urine using urinal.     Report given to SHANNON Pike.    2357 Pt via bed, accompanied by two RNs, was transferred to [facility] at 2358.

## 2020-11-10 NOTE — ANESTHESIA POSTPROCEDURE EVALUATION
Patient: Isaias Ortiz    Procedure Summary     Date: 11/09/20 Room / Location: [facility] 07 / SURGERY [facility]    Anesthesia Start: 1811 Anesthesia Stop: 2158    Procedures:       FUSION, SPINE, CERVICAL, POSTERIOR APPROACH- STAGE 2 C4-T1 (Bilateral Spine Cervical)      LAMINECTOMY, SPINE, CERVICAL, POSTERIOR APPROACH- C4-T1 (Spine Cervical) Diagnosis: (C6 burst fracture )    Surgeons: Phoenix Alvarez III, M.D. Responsible Provider: Dhruv Pineda M.D.    Anesthesia Type: general ASA Status: 3          Final Anesthesia Type: general  Last vitals  BP   Blood Pressure : 151/105, Arterial BP: 158/79    Temp   36.3 °C (97.4 °F)    Pulse   Pulse: 72   Resp   17    SpO2   98 %      Anesthesia Post Evaluation    Patient location during evaluation: PACU  Patient participation: waiting for patient participation  Level of consciousness: responsive to physical stimuli and responsive to painful stimuli  Pain score: 0    Airway patency: patent  Anesthetic complications: no  Cardiovascular status: hemodynamically stable  Respiratory status: nasal airway, oral airway and face mask  Hydration status: acceptable    PONV: none           Nurse Pain Score: 0 (NPRS)

## 2020-11-10 NOTE — DISCHARGE PLANNING
Anticipated Discharge Disposition: Acute Rehab    Action: pt pending consultation and acceptance to Acute Rehab. If pt is declined, may need SNF.    Barriers to Discharge: Acute Rehab acceptance    Plan: f/u with medical team, pt

## 2020-11-10 NOTE — THERAPY
Speech Language Pathology  Daily Treatment     Patient Name: Isaias Ortiz  Age:  80 y.o., Sex:  male  Medical Record #: [id number]  Today's Date: 11/10/2020     Precautions: Fall Risk, Swallow Precautions ( See Comments), Spinal / Back Precautions , Cervical Collar    Comments: Sx:  11/19--posterior cervical fusion and lami:  CSF Leak--repaired-- Will need HOB elevated 45 degrees continuously for next 48 hours and must wear collar at all times except when eating.    Assessment    Patient seen for swallowing reassessment this morning.  He is POD #5 for ACDF C5-T1, C6/7 lami facetectomy,u and C6 corpectomy and POD #1 for Stage 2 posterior fusion of C5-T1.  Pt sitting up in chair.  He was awake, alert and agreeable to therapy. Vocal quality gravely which is not baseline per patient.  Presentation of PO consisted of ice, mildly thick liquids, thin liquids, purees, liquidised, minced and moist, soft and bite sized and regular textures. Swallow was mildly delayed and often patient triggering 2-3 swallows to effectively clear bolus.   Laryngeal elevation was difficult to fully assess due to bandages on the anterior neck. Patient with fairly consistent throat clearing with intermittent wet voice following trials of thin liquids and regular textures.  Patient also triggering anywhere from 3-6 swallows following bites of these textures. On all other textures, he did have intermittent throat clearing and wet voice which did improve to some extent with use of a double swallow. Pt is presenting with mild oral dysphagia and S/Sx of pharyngeal dysphagia.   He remains a high risk for aspiration given the nature of his recent surgeries, and education was provided to him regarding aspiration risk as well as the signs and symptoms of aspiration to be aware of.  At this time, would recommend initiation of soft and bite sized diet (SB6) with mildly thick liquids (MT2) and close monitoring for any S/Sx of aspiration. Please encourage double  swallow.  SLP will continue to follow.     Plan    Soft and bite sized diet (SB6) with mildly thick liquids (MT2) and close monitoring for any S/Sx of aspiration.     90* angle for all PO intake.  Please encourage double swallow.   NO STRAWS.    Continue current treatment plan.    Discharge Recommendations: Recommend home health for continued speech therapy services(will defer to PT/OT - will need SLP services at next level)     Objective       11/10/20 0849   Vitals   O2 (LPM) 0   O2 Delivery Device None - Room Air   Pain 0 - 10 Group   Therapist Pain Assessment 0;Nurse Notified;Post Activity Pain Same as Prior to Activity   Dysphagia    Positioning / Behavior Modification Modulate Rate or Bite Size;Multiple Swallows;Self Monitoring;Alternate Solids and Liquids   Other Treatments PO trials ice, thins, mildly thick liquids, purees, liquidised, minced and moist and soft and bite sized   Diet / Liquid Recommendation Soft & Bite-Sized (6) - (Dysphagia III);Mildly Thick (2) - (Nectar Thick)   Nutritional Liquid Intake Rating Scale Thickened beverages (mildly thick unless otherwise specified)   Nutritional Food Intake Rating Scale Total oral diet with multiple consistencies but requiring special preparation or compensations   Nursing Communication Swallow Precaution Sign Posted at Head of Bed   Skilled Intervention Compensatory Strategies;Verbal Cueing   Recommended Route of Medication Administration   Medication Administration  Whole with Liquid Wash  (mildly thick liquids)   Short Term Goals   Short Term Goal # 3 MODIFIED 11/10: Pt will be able to consume SBG6/MT2 diet with use of double swallow and no overt S/Sx of aspiration noted   Goal Outcome  # 3 Progressing as expected   Short Term Goal # 4 11/10: Pt will be able to consume PO trials of RG7/TN0 with SLP only with no overt S/Sx of aspiration noted   Goal Outcome  # 4 Progressing as expected   Anticipated Discharge Needs   Discharge Recommendations Recommend home  [facility] for continued speech therapy services  (will defer to PT/OT - will need SLP services at next level)   Therapy Recommendations Upon DC Dysphagia Training;Community Re-Integration;Patient / Family / Caregiver Education

## 2020-11-10 NOTE — ANESTHESIA PROCEDURE NOTES
Airway    Date/Time: 11/9/2020 6:18 PM  Performed by: Dhruv Pineda M.D.  Authorized by: Dhruv Pineda M.D.     Location:  OR  Urgency:  Elective  Indications for Airway Management:  Anesthesia      Spontaneous Ventilation: absent    Sedation Level:  Deep  Preoxygenated: Yes    Patient Position:  Sniffing  Final Airway Type:  Endotracheal airway  Final Endotracheal Airway:  ETT  Cuffed: Yes    Technique Used for Successful ETT Placement:  Video laryngoscopy    Insertion Site:  Oral  Blade Type:  Juliette  Laryngoscope Blade/Videolaryngoscope Blade Size:  3  ETT Size (mm):  7.5  Measured from:  Lips  ETT to Lips (cm):  23  Placement Verified by: auscultation and capnometry    Cormack-Lehane Classification:  Grade I - full view of glottis  Number of Attempts at Approach:  1

## 2020-11-10 NOTE — ANESTHESIA TIME REPORT
Anesthesia Start and Stop Event Times     Date Time Event    11/9/2020 1805 Ready for Procedure     1811 Anesthesia Start     2158 Anesthesia Stop        Responsible Staff  11/09/20    Name Role Begin End    Dhruv Pineda M.D. Anesth 1811 2158        Preop Diagnosis (Free Text):  Pre-op Diagnosis     C6 burst fracture         Preop Diagnosis (Codes):    Post op Diagnosis  Burst fracture of cervical vertebra (HCC)  C6    Premium Reason  A. 3PM - 7AM    Comments:

## 2020-11-10 NOTE — THERAPY
Missed Therapy     Patient Name: Isaias Ortiz  Age:  80 y.o., Sex:  male  Medical Record #: [id number]  Today's Date: 11/9/2020 11/09/20 1230   Interdisciplinary Plan of Care Collaboration   IDT Collaboration with  Nursing   Collaboration Comments Spoke with RN, and patient is NPO for surgery this afternoon.  Will reassess patient's swallow function tomorrow morning.   RN aware.

## 2020-11-11 ENCOUNTER — HOSPITAL ENCOUNTER (INPATIENT)
Facility: REHABILITATION | Age: 80
LOS: 8 days | DRG: 949 | End: 2020-11-19
Attending: PHYSICAL MEDICINE & REHABILITATION | Admitting: PHYSICAL MEDICINE & REHABILITATION
Payer: MEDICARE

## 2020-11-11 ENCOUNTER — APPOINTMENT (OUTPATIENT)
Dept: RADIOLOGY | Facility: REHABILITATION | Age: 80
DRG: 949 | End: 2020-11-11
Attending: PHYSICAL MEDICINE & REHABILITATION
Payer: MEDICARE

## 2020-11-11 VITALS
BODY MASS INDEX: 33.15 KG/M2 | WEIGHT: 218.7 LBS | TEMPERATURE: 97.3 F | HEIGHT: 68 IN | HEART RATE: 88 BPM | SYSTOLIC BLOOD PRESSURE: 148 MMHG | RESPIRATION RATE: 16 BRPM | DIASTOLIC BLOOD PRESSURE: 75 MMHG | OXYGEN SATURATION: 92 %

## 2020-11-11 DIAGNOSIS — M79.2 NEUROPATHIC PAIN: ICD-10-CM

## 2020-11-11 DIAGNOSIS — S06.5X1A TRAUMATIC SUBDURAL HEMORRHAGE WITH LOSS OF CONSCIOUSNESS OF 30 MINUTES OR LESS, INITIAL ENCOUNTER (HCC): ICD-10-CM

## 2020-11-11 DIAGNOSIS — S12.500A CLOSED DISPLACED FRACTURE OF SIXTH CERVICAL VERTEBRA, UNSPECIFIED FRACTURE MORPHOLOGY, INITIAL ENCOUNTER (HCC): ICD-10-CM

## 2020-11-11 PROBLEM — G89.11 ACUTE PAIN DUE TO TRAUMA: Status: ACTIVE | Noted: 2020-11-11

## 2020-11-11 PROBLEM — K59.2 NEUROGENIC BOWEL: Status: ACTIVE | Noted: 2020-11-11

## 2020-11-11 PROBLEM — N31.9 NEUROGENIC BLADDER: Status: ACTIVE | Noted: 2020-11-11

## 2020-11-11 LAB
GLUCOSE BLD-MCNC: 183 MG/DL (ref 65–99)
GLUCOSE BLD-MCNC: 232 MG/DL (ref 65–99)
GLUCOSE BLD-MCNC: 250 MG/DL (ref 65–99)

## 2020-11-11 PROCEDURE — 82962 GLUCOSE BLOOD TEST: CPT | Mod: 91

## 2020-11-11 PROCEDURE — A9270 NON-COVERED ITEM OR SERVICE: HCPCS | Performed by: PHYSICIAN ASSISTANT

## 2020-11-11 PROCEDURE — A9270 NON-COVERED ITEM OR SERVICE: HCPCS | Performed by: NURSE PRACTITIONER

## 2020-11-11 PROCEDURE — 700111 HCHG RX REV CODE 636 W/ 250 OVERRIDE (IP): Performed by: PHYSICAL MEDICINE & REHABILITATION

## 2020-11-11 PROCEDURE — 94760 N-INVAS EAR/PLS OXIMETRY 1: CPT

## 2020-11-11 PROCEDURE — 700101 HCHG RX REV CODE 250: Performed by: PHYSICAL MEDICINE & REHABILITATION

## 2020-11-11 PROCEDURE — 700102 HCHG RX REV CODE 250 W/ 637 OVERRIDE(OP): Performed by: PHYSICIAN ASSISTANT

## 2020-11-11 PROCEDURE — 82962 GLUCOSE BLOOD TEST: CPT

## 2020-11-11 PROCEDURE — 94660 CPAP INITIATION&MGMT: CPT

## 2020-11-11 PROCEDURE — 99223 1ST HOSP IP/OBS HIGH 75: CPT | Mod: AI | Performed by: PHYSICAL MEDICINE & REHABILITATION

## 2020-11-11 PROCEDURE — 700102 HCHG RX REV CODE 250 W/ 637 OVERRIDE(OP): Performed by: PHYSICAL MEDICINE & REHABILITATION

## 2020-11-11 PROCEDURE — 700111 HCHG RX REV CODE 636 W/ 250 OVERRIDE (IP): Performed by: PHYSICIAN ASSISTANT

## 2020-11-11 PROCEDURE — 92526 ORAL FUNCTION THERAPY: CPT

## 2020-11-11 PROCEDURE — 74018 RADEX ABDOMEN 1 VIEW: CPT

## 2020-11-11 PROCEDURE — 700102 HCHG RX REV CODE 250 W/ 637 OVERRIDE(OP): Performed by: NURSE PRACTITIONER

## 2020-11-11 PROCEDURE — A9270 NON-COVERED ITEM OR SERVICE: HCPCS | Performed by: PHYSICAL MEDICINE & REHABILITATION

## 2020-11-11 PROCEDURE — 770010 HCHG ROOM/CARE - REHAB SEMI PRIVAT*

## 2020-11-11 PROCEDURE — 94640 AIRWAY INHALATION TREATMENT: CPT

## 2020-11-11 RX ORDER — DEXAMETHASONE SODIUM PHOSPHATE 4 MG/ML
4 INJECTION, SOLUTION INTRA-ARTICULAR; INTRALESIONAL; INTRAMUSCULAR; INTRAVENOUS; SOFT TISSUE EVERY 6 HOURS
Status: DISCONTINUED | OUTPATIENT
Start: 2020-11-11 | End: 2020-11-11

## 2020-11-11 RX ORDER — METHYLPREDNISOLONE 4 MG/1
TABLET ORAL
Qty: 21 TAB | Refills: 0 | Status: ON HOLD
Start: 2020-11-11 | End: 2020-11-17

## 2020-11-11 RX ORDER — FAMOTIDINE 20 MG/1
20 TABLET, FILM COATED ORAL 2 TIMES DAILY
Status: DISCONTINUED | OUTPATIENT
Start: 2020-11-11 | End: 2020-11-20 | Stop reason: HOSPADM

## 2020-11-11 RX ORDER — METHOCARBAMOL 750 MG/1
750 TABLET, FILM COATED ORAL 4 TIMES DAILY
Status: DISCONTINUED | OUTPATIENT
Start: 2020-11-11 | End: 2020-11-12

## 2020-11-11 RX ORDER — BISACODYL 10 MG/1
10 SUPPOSITORY RECTAL
Status: DISCONTINUED | OUTPATIENT
Start: 2020-11-11 | End: 2020-11-17

## 2020-11-11 RX ORDER — OXYCODONE HYDROCHLORIDE 5 MG/1
5 TABLET ORAL EVERY 6 HOURS PRN
Status: ON HOLD
Start: 2020-11-11 | End: 2020-11-17

## 2020-11-11 RX ORDER — DOCUSATE SODIUM 100 MG/1
100 CAPSULE, LIQUID FILLED ORAL 2 TIMES DAILY
Status: DISCONTINUED | OUTPATIENT
Start: 2020-11-11 | End: 2020-11-11

## 2020-11-11 RX ORDER — SENNOSIDES A AND B 8.6 MG/1
8.6 TABLET, FILM COATED ORAL
Status: DISCONTINUED | OUTPATIENT
Start: 2020-11-11 | End: 2020-11-11

## 2020-11-11 RX ORDER — ACETAMINOPHEN 500 MG
1000 TABLET ORAL EVERY 4 HOURS PRN
Qty: 30 TAB | Refills: 0 | Status: ON HOLD
Start: 2020-11-11 | End: 2020-11-17

## 2020-11-11 RX ORDER — ONDANSETRON 2 MG/ML
4 INJECTION INTRAMUSCULAR; INTRAVENOUS 4 TIMES DAILY PRN
Status: DISCONTINUED | OUTPATIENT
Start: 2020-11-11 | End: 2020-11-20 | Stop reason: HOSPADM

## 2020-11-11 RX ORDER — CALCIUM CARBONATE 500 MG/1
500 TABLET, CHEWABLE ORAL EVERY MORNING
Status: DISCONTINUED | OUTPATIENT
Start: 2020-11-11 | End: 2020-11-20 | Stop reason: HOSPADM

## 2020-11-11 RX ORDER — ACETAMINOPHEN 325 MG/1
650 TABLET ORAL EVERY 4 HOURS PRN
Status: DISCONTINUED | OUTPATIENT
Start: 2020-11-11 | End: 2020-11-20 | Stop reason: HOSPADM

## 2020-11-11 RX ORDER — DEXAMETHASONE 4 MG/1
4 TABLET ORAL EVERY 6 HOURS
Status: DISCONTINUED | OUTPATIENT
Start: 2020-11-11 | End: 2020-11-12

## 2020-11-11 RX ORDER — BISACODYL 10 MG
10 SUPPOSITORY, RECTAL RECTAL
Refills: 0 | Status: ON HOLD
Start: 2020-11-11 | End: 2020-11-17

## 2020-11-11 RX ORDER — ALUMINA, MAGNESIA, AND SIMETHICONE 2400; 2400; 240 MG/30ML; MG/30ML; MG/30ML
20 SUSPENSION ORAL
Status: DISCONTINUED | OUTPATIENT
Start: 2020-11-11 | End: 2020-11-20 | Stop reason: HOSPADM

## 2020-11-11 RX ORDER — LIDOCAINE 50 MG/G
2 PATCH TOPICAL EVERY 24 HOURS
Status: DISCONTINUED | OUTPATIENT
Start: 2020-11-12 | End: 2020-11-20 | Stop reason: HOSPADM

## 2020-11-11 RX ORDER — VITAMIN B COMPLEX
2000 TABLET ORAL EVERY MORNING
Status: DISCONTINUED | OUTPATIENT
Start: 2020-11-12 | End: 2020-11-12

## 2020-11-11 RX ORDER — DOCUSATE SODIUM 100 MG/1
200 CAPSULE, LIQUID FILLED ORAL 2 TIMES DAILY
Status: DISCONTINUED | OUTPATIENT
Start: 2020-11-11 | End: 2020-11-17

## 2020-11-11 RX ORDER — OXYCODONE HYDROCHLORIDE 5 MG/1
5 TABLET ORAL
Status: DISCONTINUED | OUTPATIENT
Start: 2020-11-11 | End: 2020-11-20 | Stop reason: HOSPADM

## 2020-11-11 RX ORDER — SENNOSIDES A AND B 8.6 MG/1
17.2 TABLET, FILM COATED ORAL
Status: DISCONTINUED | OUTPATIENT
Start: 2020-11-11 | End: 2020-11-17

## 2020-11-11 RX ORDER — PSEUDOEPHEDRINE HCL 30 MG
100 TABLET ORAL 2 TIMES DAILY
Qty: 60 CAP | Status: ON HOLD
Start: 2020-11-11 | End: 2020-11-17

## 2020-11-11 RX ORDER — DIPHENHYDRAMINE HCL 25 MG
25 TABLET ORAL EVERY 6 HOURS PRN
Qty: 30 TAB | Refills: 0 | Status: ON HOLD
Start: 2020-11-11 | End: 2020-11-17

## 2020-11-11 RX ORDER — KETOROLAC TROMETHAMINE 30 MG/ML
15 INJECTION, SOLUTION INTRAMUSCULAR; INTRAVENOUS EVERY 6 HOURS
Status: DISCONTINUED | OUTPATIENT
Start: 2020-11-11 | End: 2020-11-12

## 2020-11-11 RX ORDER — BISACODYL 10 MG/1
10 SUPPOSITORY RECTAL
Status: DISCONTINUED | OUTPATIENT
Start: 2020-11-11 | End: 2020-11-11

## 2020-11-11 RX ORDER — MIDAZOLAM HYDROCHLORIDE 5 MG/ML
5 INJECTION INTRAMUSCULAR; INTRAVENOUS PRN
Status: DISCONTINUED | OUTPATIENT
Start: 2020-11-11 | End: 2020-11-20 | Stop reason: HOSPADM

## 2020-11-11 RX ORDER — FAMOTIDINE 20 MG/1
20 TABLET, FILM COATED ORAL 2 TIMES DAILY
Qty: 60 TAB | Status: ON HOLD
Start: 2020-11-11 | End: 2020-11-17 | Stop reason: SDUPTHER

## 2020-11-11 RX ORDER — TRAZODONE HYDROCHLORIDE 50 MG/1
50 TABLET ORAL
Status: DISCONTINUED | OUTPATIENT
Start: 2020-11-11 | End: 2020-11-20 | Stop reason: HOSPADM

## 2020-11-11 RX ORDER — M-VIT,TX,IRON,MINS/CALC/FOLIC 27MG-0.4MG
1 TABLET ORAL
Status: DISCONTINUED | OUTPATIENT
Start: 2020-11-12 | End: 2020-11-20 | Stop reason: HOSPADM

## 2020-11-11 RX ORDER — DEXAMETHASONE SODIUM PHOSPHATE 4 MG/ML
4 INJECTION, SOLUTION INTRA-ARTICULAR; INTRALESIONAL; INTRAMUSCULAR; INTRAVENOUS; SOFT TISSUE EVERY 6 HOURS
Status: DISCONTINUED | OUTPATIENT
Start: 2020-11-11 | End: 2020-11-11 | Stop reason: HOSPADM

## 2020-11-11 RX ORDER — DEXTROSE MONOHYDRATE 25 G/50ML
50 INJECTION, SOLUTION INTRAVENOUS
Status: DISCONTINUED | OUTPATIENT
Start: 2020-11-11 | End: 2020-11-20 | Stop reason: HOSPADM

## 2020-11-11 RX ORDER — GABAPENTIN 300 MG/1
300 CAPSULE ORAL 3 TIMES DAILY
Status: DISCONTINUED | OUTPATIENT
Start: 2020-11-11 | End: 2020-11-12

## 2020-11-11 RX ORDER — METFORMIN HYDROCHLORIDE 500 MG/1
1000 TABLET, EXTENDED RELEASE ORAL
Status: DISCONTINUED | OUTPATIENT
Start: 2020-11-11 | End: 2020-11-20 | Stop reason: HOSPADM

## 2020-11-11 RX ORDER — ONDANSETRON 4 MG/1
4 TABLET, ORALLY DISINTEGRATING ORAL 4 TIMES DAILY PRN
Status: DISCONTINUED | OUTPATIENT
Start: 2020-11-11 | End: 2020-11-20 | Stop reason: HOSPADM

## 2020-11-11 RX ORDER — ECHINACEA PURPUREA EXTRACT 125 MG
2 TABLET ORAL PRN
Status: DISCONTINUED | OUTPATIENT
Start: 2020-11-11 | End: 2020-11-20 | Stop reason: HOSPADM

## 2020-11-11 RX ORDER — OXYCODONE HYDROCHLORIDE 10 MG/1
10 TABLET ORAL
Status: DISCONTINUED | OUTPATIENT
Start: 2020-11-11 | End: 2020-11-20 | Stop reason: HOSPADM

## 2020-11-11 RX ORDER — POLYVINYL ALCOHOL 14 MG/ML
1 SOLUTION/ DROPS OPHTHALMIC PRN
Status: DISCONTINUED | OUTPATIENT
Start: 2020-11-11 | End: 2020-11-20 | Stop reason: HOSPADM

## 2020-11-11 RX ORDER — LEVETIRACETAM 500 MG/1
500 TABLET ORAL 2 TIMES DAILY
Status: DISCONTINUED | OUTPATIENT
Start: 2020-11-11 | End: 2020-11-13

## 2020-11-11 RX ORDER — ALBUTEROL SULFATE 90 UG/1
2 AEROSOL, METERED RESPIRATORY (INHALATION) EVERY 6 HOURS PRN
Status: DISCONTINUED | OUTPATIENT
Start: 2020-11-11 | End: 2020-11-20 | Stop reason: HOSPADM

## 2020-11-11 RX ORDER — METHOCARBAMOL 750 MG/1
750 TABLET, FILM COATED ORAL EVERY 8 HOURS PRN
Qty: 120 TAB | Status: ON HOLD
Start: 2020-11-11 | End: 2020-11-17

## 2020-11-11 RX ORDER — MIDODRINE HYDROCHLORIDE 10 MG/1
10 TABLET ORAL EVERY 4 HOURS PRN
Status: DISCONTINUED | OUTPATIENT
Start: 2020-11-11 | End: 2020-11-20 | Stop reason: HOSPADM

## 2020-11-11 RX ORDER — ACETAMINOPHEN 500 MG
1000 TABLET ORAL 3 TIMES DAILY
Status: DISPENSED | OUTPATIENT
Start: 2020-11-11 | End: 2020-11-16

## 2020-11-11 RX ORDER — FLUTICASONE PROPIONATE 44 UG/1
2 AEROSOL, METERED RESPIRATORY (INHALATION)
Status: DISCONTINUED | OUTPATIENT
Start: 2020-11-11 | End: 2020-11-12

## 2020-11-11 RX ORDER — LIDOCAINE 50 MG/G
1 PATCH TOPICAL ONCE
Status: DISPENSED | OUTPATIENT
Start: 2020-11-11 | End: 2020-11-12

## 2020-11-11 RX ORDER — FAMOTIDINE 20 MG/1
20 TABLET, FILM COATED ORAL 2 TIMES DAILY
Status: DISCONTINUED | OUTPATIENT
Start: 2020-11-11 | End: 2020-11-11 | Stop reason: HOSPADM

## 2020-11-11 RX ORDER — LOVASTATIN 20 MG/1
40 TABLET ORAL DAILY
Status: DISCONTINUED | OUTPATIENT
Start: 2020-11-12 | End: 2020-11-20 | Stop reason: HOSPADM

## 2020-11-11 RX ORDER — DOCUSATE SODIUM 100 MG/1
200 CAPSULE, LIQUID FILLED ORAL 2 TIMES DAILY
Status: DISCONTINUED | OUTPATIENT
Start: 2020-11-11 | End: 2020-11-11

## 2020-11-11 RX ADMIN — SENNOSIDES 17.2 MG: 8.6 TABLET, FILM COATED ORAL at 14:09

## 2020-11-11 RX ADMIN — FLUTICASONE PROPIONATE 88 MCG: 44 AEROSOL, METERED RESPIRATORY (INHALATION) at 20:30

## 2020-11-11 RX ADMIN — Medication 2000 UNITS: at 06:24

## 2020-11-11 RX ADMIN — ENOXAPARIN SODIUM 40 MG: 40 INJECTION SUBCUTANEOUS at 17:41

## 2020-11-11 RX ADMIN — METHOCARBAMOL TABLETS 750 MG: 750 TABLET, COATED ORAL at 17:41

## 2020-11-11 RX ADMIN — FLUTICASONE PROPIONATE 88 MCG: 44 AEROSOL, METERED RESPIRATORY (INHALATION) at 07:37

## 2020-11-11 RX ADMIN — ACETAMINOPHEN 1000 MG: 500 TABLET, FILM COATED ORAL at 21:53

## 2020-11-11 RX ADMIN — KETOROLAC TROMETHAMINE 15 MG: 30 INJECTION, SOLUTION INTRAMUSCULAR at 06:27

## 2020-11-11 RX ADMIN — BISACODYL 10 MG: 10 SUPPOSITORY RECTAL at 20:12

## 2020-11-11 RX ADMIN — INSULIN HUMAN 3 UNITS: 100 INJECTION, SOLUTION PARENTERAL at 08:28

## 2020-11-11 RX ADMIN — METHOCARBAMOL TABLETS 750 MG: 750 TABLET, COATED ORAL at 21:53

## 2020-11-11 RX ADMIN — GABAPENTIN 300 MG: 300 CAPSULE ORAL at 21:53

## 2020-11-11 RX ADMIN — GABAPENTIN 300 MG: 300 CAPSULE ORAL at 14:19

## 2020-11-11 RX ADMIN — FAMOTIDINE 20 MG: 20 TABLET, FILM COATED ORAL at 21:54

## 2020-11-11 RX ADMIN — DEXAMETHASONE 4 MG: 4 TABLET ORAL at 17:41

## 2020-11-11 RX ADMIN — UMECLIDINIUM BROMIDE AND VILANTEROL TRIFENATATE 1 PUFF: 62.5; 25 POWDER RESPIRATORY (INHALATION) at 07:37

## 2020-11-11 RX ADMIN — LOVASTATIN 40 MG: 20 TABLET ORAL at 06:24

## 2020-11-11 RX ADMIN — THERA TABS 1 TABLET: TAB at 06:24

## 2020-11-11 RX ADMIN — SITAGLIPTIN 100 MG: 100 TABLET, FILM COATED ORAL at 06:25

## 2020-11-11 RX ADMIN — INSULIN HUMAN 3 UNITS: 100 INJECTION, SOLUTION PARENTERAL at 14:11

## 2020-11-11 RX ADMIN — DEXAMETHASONE 4 MG: 4 TABLET ORAL at 23:43

## 2020-11-11 RX ADMIN — INSULIN HUMAN 2 UNITS: 100 INJECTION, SOLUTION PARENTERAL at 21:56

## 2020-11-11 RX ADMIN — FAMOTIDINE 20 MG: 20 TABLET, FILM COATED ORAL at 06:24

## 2020-11-11 RX ADMIN — DEXAMETHASONE SODIUM PHOSPHATE 4 MG: 4 INJECTION, SOLUTION INTRA-ARTICULAR; INTRALESIONAL; INTRAMUSCULAR; INTRAVENOUS; SOFT TISSUE at 08:25

## 2020-11-11 RX ADMIN — KETOROLAC TROMETHAMINE 15 MG: 30 INJECTION, SOLUTION INTRAMUSCULAR at 00:00

## 2020-11-11 RX ADMIN — DEXAMETHASONE SODIUM PHOSPHATE 4 MG: 4 INJECTION, SOLUTION INTRA-ARTICULAR; INTRALESIONAL; INTRAMUSCULAR; INTRAVENOUS; SOFT TISSUE at 02:20

## 2020-11-11 RX ADMIN — ANTACID TABLETS 500 MG: 500 TABLET, CHEWABLE ORAL at 06:24

## 2020-11-11 RX ADMIN — LEVETIRACETAM 500 MG: 500 TABLET ORAL at 06:48

## 2020-11-11 RX ADMIN — DOCUSATE SODIUM 100 MG: 100 CAPSULE ORAL at 06:24

## 2020-11-11 RX ADMIN — INSULIN HUMAN 2 UNITS: 100 INJECTION, SOLUTION PARENTERAL at 17:29

## 2020-11-11 RX ADMIN — METFORMIN HYDROCHLORIDE 1000 MG: 500 TABLET, EXTENDED RELEASE ORAL at 17:41

## 2020-11-11 RX ADMIN — ACETAMINOPHEN 1000 MG: 500 TABLET, FILM COATED ORAL at 14:09

## 2020-11-11 RX ADMIN — LEVETIRACETAM 500 MG: 500 TABLET ORAL at 21:53

## 2020-11-11 ASSESSMENT — LIFESTYLE VARIABLES
HAVE PEOPLE ANNOYED YOU BY CRITICIZING YOUR DRINKING: NO
EVER HAD A DRINK FIRST THING IN THE MORNING TO STEADY YOUR NERVES TO GET RID OF A HANGOVER: NO
AVERAGE NUMBER OF DAYS PER WEEK YOU HAVE A DRINK CONTAINING ALCOHOL: 0
TOTAL SCORE: 0
HOW MANY TIMES IN THE PAST YEAR HAVE YOU HAD 5 OR MORE DRINKS IN A DAY: 0
TOTAL SCORE: 0
EVER_SMOKED: YES
TOTAL SCORE: 0
ALCOHOL_USE: NO
CONSUMPTION TOTAL: NEGATIVE
HAVE YOU EVER FELT YOU SHOULD CUT DOWN ON YOUR DRINKING: NO
EVER FELT BAD OR GUILTY ABOUT YOUR DRINKING: NO
ON A TYPICAL DAY WHEN YOU DRINK ALCOHOL HOW MANY DRINKS DO YOU HAVE: 0

## 2020-11-11 ASSESSMENT — PATIENT HEALTH QUESTIONNAIRE - PHQ9
2. FEELING DOWN, DEPRESSED, IRRITABLE, OR HOPELESS: NOT AT ALL
SUM OF ALL RESPONSES TO PHQ9 QUESTIONS 1 AND 2: 0
1. LITTLE INTEREST OR PLEASURE IN DOING THINGS: NOT AT ALL
1. LITTLE INTEREST OR PLEASURE IN DOING THINGS: NOT AT ALL
SUM OF ALL RESPONSES TO PHQ9 QUESTIONS 1 AND 2: 0
2. FEELING DOWN, DEPRESSED, IRRITABLE, OR HOPELESS: NOT AT ALL

## 2020-11-11 ASSESSMENT — FIBROSIS 4 INDEX: FIB4 SCORE: 3.53

## 2020-11-11 ASSESSMENT — PAIN DESCRIPTION - PAIN TYPE
TYPE: ACUTE PAIN;SURGICAL PAIN
TYPE: ACUTE PAIN;SURGICAL PAIN

## 2020-11-11 NOTE — DISCHARGE INSTRUCTIONS
Discharge Instructions    Discharged to other by medical transportation with escort. Discharged via wheelchair, hospital escort: Yes.  Special equipment needed: C-Collar    Be sure to schedule a follow-up appointment with your primary care doctor or any specialists as instructed.     Discharge Plan:   Influenza Vaccine Indication: Patient Refuses(immunized PTA)    I understand that a diet low in cholesterol, fat, and sodium is recommended for good health. Unless I have been given specific instructions below for another diet, I accept this instruction as my diet prescription.   Other diet:  Easy to chew foods    · Special Instructions:   Patient to follow up with [facility] in two weeks See DC summary for activity restrictions OK to transfer to rehab when following criteria are met: VSS, pain controlled on oral medications, patient ambulating, voiding, passing flatus, drain removed. Keep patient HOB great than 45 degrees for three additional days    · Is patient discharged on Warfarin / Coumadin?   No     Depression / Suicide Risk    As you are discharged from this [facility] facility, it is important to learn how to keep safe from harming yourself.    Recognize the warning signs:  · Abrupt changes in personality, positive or negative- including increase in energy   · Giving away possessions  · Change in eating patterns- significant weight changes-  positive or negative  · Change in sleeping patterns- unable to sleep or sleeping all the time   · Unwillingness or inability to communicate  · Depression  · Unusual sadness, discouragement and loneliness  · Talk of wanting to die  · Neglect of personal appearance   · Rebelliousness- reckless behavior  · Withdrawal from people/activities they love  · Confusion- inability to concentrate     If you or a loved one observes any of these behaviors or has concerns about self-harm, here's what you can do:  · Talk about it- your feelings and reasons for harming  yourself  · Remove any means that you might use to hurt yourself (examples: pills, rope, extension cords, firearm)  · Get professional help from the community (Mental Health, Substance Abuse, psychological counseling)  · Do not be alone:Call your Safe Contact- someone whom you trust who will be there for you.  · Call your local CRISIS HOTLINE 803-6643 or 926-003-9901  · Call your local Children's Mobile Crisis Response Team [place] (689) 094-9821 or [employer]  · Call the toll free National Suicide Prevention Hotlines   · National Suicide Prevention Lifeline 683-476-BLXP (0136)  · National Hope Line Network 800-SUICIDE (104-0125)      Cervical Fusion, Care After  This sheet gives you information about how to care for yourself after your procedure. Your health care provider may also give you more specific instructions. If you have problems or questions, contact your health care provider.  What can I expect after the procedure?  After the procedure, it is common to have:  · Incision area pain.  · Numbness.  · Weakness.  · Sore throat.  · Difficulty swallowing.  Follow these instructions at home:  Medicines  · Take over-the-counter and prescription medicines, including pain medicines, only as told by your health care provider.  · If you were prescribed an antibiotic medicine, take it as told by your health care provider. Do not stop taking the antibiotic even if you start to feel better.  If you have a brace:  · Wear the brace as told by your health care provider. Remove it only as told by your health care provider.  · Keep the brace clean.  Incision care    · Follow instructions from your health care provider about how to take care of your incision. Make sure you:  ? Wash your hands with soap and water before you change your bandage (dressing). If soap and water are not available, use hand .  ? Change your dressing as told by your health care provider.  ? Leave stitches (sutures), skin glue,  or adhesive strips in place. These skin closures may need to be in place for 2 weeks or longer. If adhesive strip edges start to loosen and curl up, you may trim the loose edges. Do not remove adhesive strips completely unless your health care provider tells you to do that.  · Keep your incision clean and dry. Do not take baths, swim, or use a hot tub until your health care provider approves.  · Check your incision area every day for signs of infection. Check for:  ? More redness, swelling, or pain.  ? More fluid or blood.  ? Warmth.  ? Pus or a bad smell.  Activity    · Rest and protect your back as much as possible.  · Do not lift anything that is heavier than 10 lb (4.5 kg) or the limit that you are told by your health care provider.  · Do not twist or bend at the waist until your health care provider approves.  · Avoid:  ? Pushing and pulling motions.  ? Lifting anything over your head.  ? Sitting or lying down in the same position for long periods of time.  · Do not exercise until your health care provider approves. Once your health care provider has approved exercise, ask him or her what kinds of exercises you can do to make your back stronger (physical therapy).  · Do not drive until your health care provider approves.  ? Do not drive for 24 hours if you received a medicine to help you relax (sedative).  ? Do not drive or use heavy machinery while taking prescription pain medicine.  General instructions  · Have someone assist you to turn in bed frequently by moving your whole body without twisting your back (log rolling technique).  · Wear compression stockings as told by your health care provider. These stockings help to prevent blood clots and reduce swelling in your legs.  · Do not use any products that contain nicotine or tobacco, such as cigarettes and e-cigarettes. These can delay bone healing. If you need help quitting, ask your health care provider.  · To prevent or treat constipation while you are  taking prescription pain medicine, your health care provider may recommend that you:  ? Drink enough fluid to keep your urine clear or pale yellow.  ? Take over-the-counter or prescription medicines.  ? Eat foods that are high in fiber, such as fresh fruits and vegetables, whole grains, and beans.  ? Limit foods that are high in fat and processed sugars, such as fried and sweet foods.  · Keep all follow-up visits as told by your health care provider. This is important.  Contact a health care provider if:  · You have pain that gets worse or does not get better with medicine.  · You have more redness, swelling, or pain around your incision.  · You have more fluid or blood coming from your incision.  · Your incision feels warm to the touch.  · You have pus or a bad smell coming from your incision.  · You have a fever.  · You have weakness or numbness in your legs that is new or getting worse.  · You have swelling in your calf or leg.  · The edges of your incision break open.  · You vomit or feel nauseous.  · You have trouble controlling urination or bowel movements.  Get help right away if:  · You develop shortness of breath or chest pain.  · You have trouble swallowing.  · You have trouble breathing.  · You develop a cough.  This information is not intended to replace advice given to you by your health care provider. Make sure you discuss any questions you have with your health care provider.  Document Released: 08/01/2005 Document Revised: 11/30/2018 Document Reviewed: 06/28/2017  [employer] Patient Education © 2020 [employer]      Laminectomy, Care After  This sheet gives you information about how to care for yourself after your procedure. Your health care provider may also give you more specific instructions. If you have problems or questions, contact your health care provider.  What can I expect after the procedure?  After the procedure, it is common to have:  · Some pain around your incision area.  · Muscle  tightening (spasms) across the back.  Follow these instructions at home:  Incision care    · Follow instructions from your health care provider about how to take care of your incision area. Make sure you:  ? Wash your hands with soap and water before and after you apply medicine to the area or change your bandage (dressing). If soap and water are not available, use hand .  ? Change your dressing as told by your health care provider.  ? Leave stitches (sutures), skin glue, or adhesive strips in place. These skin closures may need to stay in place for 2 weeks or longer. If adhesive strip edges start to loosen and curl up, you may trim the loose edges. Do not remove adhesive strips completely unless your health care provider tells you to do that.  · Check your incision area every day for signs of infection. Check for:  ? More redness, swelling, or pain.  ? More fluid or blood.  ? Warmth.  ? Pus or a bad smell.  Medicines  · Take over-the-counter and prescription medicines only as told by your health care provider.  · If you were prescribed an antibiotic medicine, use it as told by your health care provider. Do not stop using the antibiotic even if you start to feel better.  Bathing  · Do not take baths, swim, or use a hot tub for 2 weeks, or until your incision has healed completely.  · If your health care provider approves, you may take showers after your dressing has been removed.  Activity    · Return to your normal activities as told by your health care provider. Ask your health care provider what activities are safe for you.  · Avoid bending or twisting at your waist. Always bend at your knees.  · Do not sit for more than 20-30 minutes at a time. Lie down or walk between periods of sitting.  · Do not lift anything that is heavier than 10 lb (4.5 kg) or the limit that your health care provider tells you, until he or she says that it is safe.  · Do not drive for 2 weeks after your procedure or for as long  as your health care provider tells you.  · Do not drive or use heavy machinery while taking prescription pain medicine.  General instructions  · To prevent or treat constipation while you are taking prescription pain medicine, your health care provider may recommend that you:  ? Drink enough fluid to keep your urine clear or pale yellow.  ? Take over-the-counter or prescription medicines.  ? Eat foods that are high in fiber, such as fresh fruits and vegetables, whole grains, and beans.  ? Limit foods that are high in fat and processed sugars, such as fried and sweet foods.  · Do breathing exercises as told.  · Keep all follow-up visits as told by your health care provider. This is important.  Contact a health care provider if:  · You have more redness, swelling, or pain around your incision area.  · Your incision feels warm to the touch.  · You are not able to return to activities or do exercises as told by your health care provider.  Get help right away if:  · You have:  ? More fluid or blood coming from your incision area.  ? Pus or a bad smell coming from your incision area.  ? Chills or a fever.  ? Episodes of dizziness or fainting while standing.  · You develop a rash.  · You develop shortness of breath or you have difficulty breathing.  · You cannot control when you urinate or have a bowel movement.  · You become weak.  · You are not able to use your legs.  Summary  · After the procedure, it is common to have some pain around your incision area. You may also have muscle tightening (spasms) across the back.  · Follow instructions from your health care provider about how to care for your incision.  · Do not lift anything that is heavier than 10 lb (4.5 kg) or the limit that your health care provider tells you, until he or she says that it is safe.  · Contact your health care provider if you have more redness, swelling, or pain around your incision area or if your incision feels warm to the touch. These can be  signs of infection.  This information is not intended to replace advice given to you by your health care provider. Make sure you discuss any questions you have with your health care provider.  Document Released: 07/07/2006 Document Revised: 11/30/2018 Document Reviewed: 06/04/2017  [employer]vier Patient Education © 2020 [employer]    How to Use a Hard Cervical Collar  A hard cervical collar limits the movement of the top part of your spine (cervical spine). The collar holds your head and neck in a straight position. A cervical collar may be used to treat:  · A fracture in the neck.  · Damage to the ligaments. Ligaments are tissues that connect bones.  · Injury to the spinal cord.  There are several types of hard cervical collars. Follow the 's instructions for use. These are general guidelines.  What are the risks?  Wearing a cervical collar is safe. However, problems may occur, including:  · Skin breakdown.  · Sores that form due to rubbing or pressure on the skin (pressure ulcers).  · Pain.  · Difficulty breathing.  · Worsening of your condition if the collar is not placed correctly.  · Increased risk of inhaling food or liquid into your lungs (aspiration).  Supplies needed:  · Extra cervical collar and replacement pads.  · Ice.  · Plastic bag.  · Towel.  · A watertight covering to put over the collar during bathing, if needed.  How to use a cervical collar  · Wear the cervical collar as told by your health care provider. Do not remove it unless told to do so by your health care provider.  · You may be directed to remove it only when you check your skin and change the pads. While the collar is off:  ? Ask another person to assist you if needed.  ? Keep your head and neck straight.  Do not bend your neck or turn your head.  ? Check your skin daily for red areas. Ask for help or use a mirror to check areas you cannot see.  ? After checking your skin, wear the extra cervical collar while cleaning and  changing the pads of the other collar.  · Change the pads daily or more often if they become wet or dirty. Keep a clean set of replacement pads.  · Do not let hard plastic edges touch your skin. Cover them with a soft pad.  · If your cervical collar is not waterproof:  ? Do not let it get wet.  ? Cover it with a watertight covering when you take a bath or a shower.  Follow these instructions at home:    · Put ice on the injured area to manage pain, stiffness, and swelling.  ? Do not remove your cervical collar unless told to do so by your health care provider.  ? Put ice in a plastic bag.  ? Place a towel between your skin and the bag or between your cervical collar and the bag.  ? Leave the ice on for 20 minutes, 2-3 times a day for the first 2 days after your injury.  · Do not drive any vehicle until your health care provider approves.  · Keep all follow-up visits as told by your health care provider. This is important. Any delay in getting the care that you need can prevent proper healing of your injury.  Contact a health care provider if you have:  · Red areas of skin under your cervical collar.  · Pain that is not controlled with your medicines.  Get help right away if you have:  · Numbness or weakness in your arms or legs.  · Difficulty breathing.  These symptoms may represent a serious problem that is an emergency. Do not wait to see if the symptoms will go away. Get medical help right away. Call your local emergency services (911 in the [place] Do not drive yourself to the hospital.  Summary  · A cervical collar is a device that supports the chin and the back of the head. It restricts movement of the neck to prevent more damage after a severe injury.  · A cervical collar may be used to treat a fracture in the neck, damage to tissues that hold bones together (ligaments), or injury to the spinal cord.  · Wear the cervical collar as told by your health care provider. Ask if you may remove the collar to shower,  bathe, or eat, or to put ice on your neck.  This information is not intended to replace advice given to you by your health care provider. Make sure you discuss any questions you have with your health care provider.  Document Released: 09/09/2005 Document Revised: 06/25/2019 Document Reviewed: 11/09/2018  Elsevier Patient Education © 2020 [employer]

## 2020-11-11 NOTE — THERAPY
"Occupational Therapy   Initial Evaluation     Patient Name: Isaias Ortiz  Age:  80 y.o., Sex:  male  Medical Record #: [id number]  Today's Date: 11/10/2020     Precautions: Fall Risk, Spinal / Back Precautions , Cervical Collar    Comments: on 2L O2; HOB >  45' for 48hrs(Sx:  11/19--posterior cervical fusion and lami:  CSF Leak--r)    Assessment  Patient is 80 y.o. male admitted for C6 fracture and TBI after a ground level fall. He has a PMHx of HTN and DM2. He is now s/p 2 stage C4-T1 laminectomy and fusion and CSF leak repair.   Pts ability to complete ADLs and functional transfers today was limited by pain, limited knowledge of spinal precautions, and poor activity tolerance. His wife was present during the session today to review spinal precautions and discuss strategies for home. Will continue to follow in this setting.     Plan  Recommend Occupational Therapy 4 times per week until therapy goals are met for the following treatments:  Adaptive Equipment, Manual Therapy Techniques, Neuro Re-Education / Balance, Self Care/Activities of Daily Living, Therapeutic Activities and Therapeutic Exercises.    DC Equipment Recommendations:  Front-Wheel Walker  Discharge Recommendations:  Anticipate that the patient will continue to improve in the next 24-48 hours. Recommend home health for continued occupational therapy services     Subjective  \"You ladies are having too much fun\"     Objective   11/10/20 1614   Prior Living Situation   Prior Services None   Housing / Facility 1 Story House   Bathroom Set up Walk In Shower;Shower Chair  (built in bench seat and shower hose)   Equipment Owned 4-Wheel Walker;Hand Held Shower   Lives with - Patient's Self Care Capacity Spouse   Comments Spouse is able to assist, however is not sure how much she can help physically   Prior Level of ADL Function   Self Feeding Independent   Grooming / Hygiene Independent   Bathing Independent   Dressing Independent   Toileting Independent   Prior " Level of IADL Function   Medication Management Independent   Laundry Independent   Kitchen Mobility Independent   Finances Independent   Home Management Independent   Shopping Independent   Prior Level Of Mobility Independent Without Device in Community   Driving / Transportation Driving Independent   Occupation (Pre-Hospital Vocational) Retired Due To Age  (former )   History of Falls   History of Falls Yes   Precautions   Precautions Fall Risk;Spinal / Back Precautions ;Cervical Collar     Comments on 2L O2; HOB >  45' for 48hrs  (Sx:  11/19--posterior cervical fusion and lami:  CSF Leak--r)   Pain 0 - 10 Group   Therapist Pain Assessment Post Activity Pain Same as Prior to Activity;0  (reported shoulder feeling tight)   Cognition    Cognition / Consciousness WDL   Level of Consciousness Alert   Comments pleasant and cooperative; some delayed responses   Active ROM Upper Body   Active ROM Upper Body  X   Comments shoulder internal rotation limited bilaterally`   Strength Upper Body   Upper Body Strength  WDL   Sensation Upper Body   Upper Extremity Sensation  WDL   Upper Body Muscle Tone   Upper Body Muscle Tone  WDL   Coordination Upper Body   Coordination WDL   Balance Assessment   Sitting Balance (Static) Fair +   Sitting Balance (Dynamic) Fair   Standing Balance (Static) Fair   Standing Balance (Dynamic) Fair -   Weight Shift Sitting Fair   Weight Shift Standing Fair   Comments w/ FWW   Bed Mobility    Comments up in chair   ADL Assessment   Lower Body Dressing Minimal Assist   Toileting Moderate Assist   Functional Mobility   Sit to Stand Minimal Assist   Bed, Chair, Wheelchair Transfer Minimal Assist   Mobility walked to sink and back to chair   Activity Tolerance   Comments no c/o pain or fatifue with minimal activity; tolerated sitting in chair    Patient / Family Goals   Patient / Family Goal #1 to go home   Short Term Goals   Short Term Goal # 1 pt will complete toilet transfer and  dajuan care w/ spv and use of AE   Short Term Goal # 2 Pt will complete full body dressing with spv and use of AE   Short Term Goal # 3 pt will don/doff cervical collar with spv   Education Group   Education Provided Back Safety;Home Safety;Role of Occupational Therapist;Activities of Daily Living   Role of Occupational Therapist Patient Response Patient;Acceptance;Explanation   Back Safety Patient Response Patient;Acceptance;Explanation;Verbal Demonstration;Action Demonstration   Home Safety Patient Response Patient;Acceptance;Explanation;Verbal Demonstration   ADL Patient Response Patient;Acceptance;Explanation;Verbal Demonstration;Action Demonstration   Additional Comments Focused on safety with ADLs and discussed home safety and strategies; Spent additional time on caregiver education and demonstration   Problem List   Problem List Decreased Active Daily Living Skills;Decreased Activity Tolerance;Decreased Functional Mobility;Impaired Postural Control / Balance   Interdisciplinary Plan of Care Collaboration   IDT Collaboration with  Nursing   Patient Position at End of Therapy Seated;Chair Alarm On;Call Light within Reach;Tray Table within Reach;Family / Friend in Room   Collaboration Comments RN updated   Session Information   Date / Session Number  11/10 1(1/4, 11/16)   Priority 4

## 2020-11-11 NOTE — THERAPY
Speech Language Pathology  Daily Treatment     Patient Name: Isaias Ortiz  Age:  80 y.o., Sex:  male  Medical Record #: [id number]  Today's Date: 11/11/2020     Precautions: Fall Risk, Spinal / Back Precautions , Swallow Precautions ( See Comments), Cervical Collar    Comments: Sx:  11/19--posterior cervical fusion and lami:  CSF Leak--repaired-- Will need HOB elevated 45 degrees continuously for next 48 hours and must wear collar at all times except when eating.    Assessment    Pt sitting up in chair.  He was awake, alert and agreeable to therapy. Vocal quality is improving.  Presentation of PO consisted of soft and bite sized tray with mildly thick liquids as well as thin liquids, mixed consistencies and regular consistencies. Patient needing min cues to utilize double swallow strategy and not to talk with food in his mouth.  He had 2 episodes of throat clearing and cough x1 following thin liquids, but with strict adherence to single sips and double swallow, S/Sx of aspiration were minimized.  Patient was able to consume all other consistencies with only subtle throat clearing which he again reports as baseline.  Overall swallow function continues to improve.   He remains a high risk for aspiration given the nature of his recent surgeries and inconsistency with adherence to swallowing strategies. Education was again provided regarding importance of adhering to swallowing strategies and aspiration risk as well as the signs and symptoms of aspiration to be aware of.  At this time, would recommend diet upgrade to regular/easy to chew (EC7) with thin liquids (TN0) with close monitoring for any S/Sx of aspiration. Please encourage double swallow.  SLP will continue to follow.      Plan     Regular/easy to chew (EC7) with thin liquids (TN0)  and close monitoring for any S/Sx of aspiration.      90* angle for all PO intake.  Please encourage double swallow.   NO STRAWS.    Continue current treatment plan.    Discharge  Recommendations: Recommend home health for additional speech therapy services.  Per cognitive evaluation on 11/6--consider supervision with IADLs (e.g. medication/financial management, scheduling and cooking, etc.).     Objective     11/11/20 0910   Vitals   O2 (LPM) 2   O2 Delivery Device Nasal Cannula   Pain 0 - 10 Group   Therapist Pain Assessment Nurse Notified;Post Activity Pain Same as Prior to Activity;0   Voice   Comments improving strength   Dysphagia    Positioning / Behavior Modification Modulate Rate or Bite Size;Multiple Swallows;Alternate Solids and Liquids;Cough / Clear after Swallow   Other Treatments PO trials reg and thins in addition to SB6/MT2 diet tray   Diet / Liquid Recommendation Regular - Easy to Chew (7);Thin (0)   Nutritional Liquid Intake Rating Scale Non thickened beverages   Nutritional Food Intake Rating Scale Total oral diet with multiple consistencies without special preparation but with specific food limitations   Nursing Communication Swallow Precaution Sign Posted at Head of Bed   Skilled Intervention Compensatory Strategies;Verbal Cueing   Comments Strict adherence to swallow precautions is needed   Recommended Route of Medication Administration   Medication Administration  Whole with Liquid Wash   Short Term Goals   Short Term Goal # 3 MODIFIED 11/10: Pt will be able to consume SBG6/MT2 diet with use of double swallow and no overt S/Sx of aspiration noted   Goal Outcome  # 3 Goal met   Short Term Goal # 4 11/11: Pt will be able to consume EC7/TN0 diet with independent use of double swallow and no overt S/Sx of aspiration or changes in respiratory status/lung function.   Goal Outcome  # 4 Progressing as expected   Anticipated Discharge Needs   Discharge Recommendations Recommend post-acute placement for additional speech therapy services prior to discharge home   Therapy Recommendations Upon DC Dysphagia Training;Community Re-Integration;Patient / Family / Caregiver  Education;Cognitive-Linguistic Training

## 2020-11-11 NOTE — PROGRESS NOTES
Report given Aliza ORTEGA at [facility], all questions answered. IV d/c'd, drain d/c'd. All belongings gathered. Wife is aware of transport per Case management. Pt waiting for transportation.

## 2020-11-11 NOTE — DISCHARGE PLANNING
Anticipated Discharge Disposition: [facility]    Action: pt accepted to [facility], spoke to both pt and wife who verbalized wanting [facility]. Karmen Whittington with [facility] notified by [employer], who states Dr. Chacon accepting, pickup time at 1130. Pt notified of pickup time.  Cobra completed given to SHANNON Jon. RAMA Bob notified of pt transfer via [employer]    Barriers to Discharge: none    Plan: transfer to [facility] at 1130.

## 2020-11-11 NOTE — DISCHARGE PLANNING
TCC.  Dr Chacon has accepted pt to [facility]  Transport set up for 1130.   I spoke with spouse informing of above, and she is in agreement as well as pt.   infor provided on visitation related to Covid19  Spouse plans to visit @ rehab later today.     Thank you for referral.

## 2020-11-11 NOTE — FACE TO FACE
Face to Face Note  -  Durable Medical Equipment    aSira Bob P.A.-C. - NPI: [id number]  I certify that this patient is under my care and that they have had a durable medical equipment(DME)face to face encounter by myself that meets the physician DME face-to-face encounter requirements with this patient on:    Date of encounter:   Patient:                    MRN:                       YOB: 2018  Isaias Ortiz  [id number]  1940     The encounter with the patient was in whole, or in part, for the following medical condition, which is the primary reason for durable medical equipment:  Post-Op Surgery    I certify that, based on my findings, the following durable medical equipment is medically necessary:  Walkers and Other DME Equipment - Shower chair with back rest.    HOME O2 Saturation Measurements:(Values must be present for Home Oxygen orders)         ,     ,         My Clinical findings support the need for the above equipment due to:  Frequent Falls, Wound/Incision, Other - balance changes    Supporting Symptoms: Cervical myelopathy     ------------------------------------------------------------------------------------------------------------------    Face to Face Supporting Documentation - Home Health    The encounter with this patient was in whole or in part the primary reason for home health admission.    Date of encounter:   Patient:                    MRN:                       YOB: 2018  Isaias Ortiz  [id number]  1940     Home health to see patient for:  Physical Therapy evaluation and treatment, Occupational therapy evaluation and treatment and Speech Language Pathology evaluation and treatment    Skilled need for:  Surgical Aftercare Patient has difficulty with gait, dysphagia, and had a CSF leak intraoperatively andt requires close monitoring    Skilled nursing interventions to include:  Wound Care    Homebound evidenced status by:  Need the aid of  supportive devices such as crutches, canes, wheelchairs or walkers or Needs the assistance of another person in order to leave the home. Leaving home must require a considerable and taxing effort. There must exist a normal inability to leave the home.    Community Physician to provide follow up care: Armida Obregon M.D.     Optional Interventions    Wound information & treatment:    Home Infusion Therapy orders:    Line/Drain/Airway:    I certify the face to face encounter for this home care referral meets the CMS requirements and the encounter/clinical assessment with the patient was, in whole, or in part, for the medical condition(s) listed above, which is the primary reason for home health care. Based on my clinical findings: the service(s) are medically necessary, support the need for home health care, and the homebound criteria are met.  I certify that this patient has had a face to face encounter by myself.  Saira Bob P.A.-C. - NPI: [id number]    *Debility, frailty and advanced age in the absence of an acute deterioration or exacerbation of a condition do not qualify a patient for home health.

## 2020-11-11 NOTE — PREADMISSION SCREENING NOTE
Pre-Admission Screening Form    Patient Information:   Name: Isaias Ortiz     MRN: [id number]       : 1940      Age: 80 y.o.   Gender: male      Race: White [7]       Marital Status:  [2]  Family Contact: Gladys Ray        Relationship: Spouse [17]  Home Phone: [phone]           Cell Phone:   Advanced Directives: None  Code Status:  FULL  Current Attending Provider: Phoenix Alvarez III, M.D.  Referring Physician: JEFF Abreu      Physiatrist Consult: Dr Houston Rivera       Referral Date:  2020    Primary Payor Source:  MEDICARE  Secondary Payor Source:  [facility]    Medical Information:   Date of Admission to Acute Care [place]  Room Number: T305/01  Rehabilitation Diagnosis/  0002.22 - Brain Dysfunction: Traumatic, Closed Injury and 0003.9 - Neurologic Conditions: Other Neurologic    There is no immunization history on file for this patient.  Allergies   Allergen Reactions   • Codeine Anaphylaxis   • Opium Anaphylaxis     Past Medical History:   Diagnosis Date   • Chronic obstructive pulmonary disease (HCC)    • Diabetes (HCC)    • High cholesterol    • Hypertension    • Prostate cancer (HCC)     past hx     Past Surgical History:   Procedure Laterality Date   • PB TUCKER W/O FACETEC FORAMOT/DSKC  VRT SEG, CE*  2020    Procedure: LAMINECTOMY, SPINE, CERVICAL, POSTERIOR APPROACH- C4-T1;  Surgeon: Phoenix Alvarez III, M.D.;  Location: [facility];  Service: Neurosurgery   • CERVICAL FUSION POSTERIOR Bilateral 2020    Procedure: FUSION, SPINE, CERVICAL, POSTERIOR APPROACH- STAGE 2 C4-T1;  Surgeon: Phoenix Alvarez III, M.D.;  Location: [facility];  Service: Neurosurgery   • CERVICAL DISK AND FUSION ANTERIOR  2020    Procedure: DISCECTOMY, SPINE, CERVICAL, ANTERIOR APPROACH, WITH FUSION C5-7;  Surgeon: Phoenix Alvarez III, M.D.;  Location: [facility];  Service: Neurosurgery   • CORPECTOMY  2020    Procedure: CORPECTOMY, SPINE C6;  Surgeon:  Phoenix Alvarez III, M.D.;  [facility]: [facility];  Service: Neurosurgery   • CYST EXCISION      from rectum       History Leading to Admission, Conditions that Caused the Need for Rehab (CMS):     Jitendra Lee M.D.   Physician   Surgery General            Trauma Surgery History and Physical     Chief Complaint: Ground-level fall     History of Present Illness: The patient is a 80-year-old man who sustained a ground-level fall.  He apparently tripped over something in a parking lot and fell and struck his head.  He did not lose consciousness but did experience pain in his head and neck.  He was seen at an outside facility and noted to have a traumatic brain injury as well as a C-spine fracture.  He was transferred here for further pain.  He does complain of neck and head pain.  He denies weakness, tingling, or numbness.  He also denies any abdominal pain.     Triage Category: The patient was triaged as a Trauma Green Transfer activation. The patient was initially evaluated at [facility] in [place], [place] where The above injuries were noted. The patient was transported to [facility] in [place], [place] for a definitive neurotrauma evaluation. An expeditious primary and secondary survey with required adjuncts was conducted. See Trauma Narrator for full details.     Past Medical History:  has a past medical history of Chronic obstructive pulmonary disease (HCC), Diabetes (HCC), High cholesterol, Hypertension, and Prostate cancer (HCC).     Past Surgical History:  has a past surgical history that includes cyst excision.                Problems:     Contraindication to deep vein thrombosis (DVT) prophylaxis  Systemic anticoagulation contraindicated secondary to elevated bleeding risk.  Consider surveillance venous duplex scanning if unable to initiate chemical DVT prophylaxis within 48 hrs of admission.     Screening examination for infectious disease  11/5 COVID-19  specimen sent.     Trauma  GLF.  Trauma Green Transfer Activation.  Jitendra Lee MD. Trauma Surgery.     Subdural hemorrhage (HCC)  Subdural blood tracking along the falx without significant adjacent mass effect.  Repeat interval head CT  TEG with platelet mapping pending  Definitive plan pending.  Post traumatic pharmacologic seizure prophylaxis for 1 week.  Speech Language Pathology cognitive evaluation.  Phoenix Alvarez MD. Neurosurgeon. [facility].     C6 cervical fracture (HCC)  C6 vertebral body fracture with 6mm of retropulsion posteriorly resulting in severe spinal canal narrowing with involvement of pedicles with posterior displacement of the vertebral body.  MRI cervical spine pending  [place] collar.  Definitive plan pending.  Cspine log roll until cleared by neurosurgery  Phoenix Alvarez MD. Neurosurgeon. [facility].     DM2 (diabetes mellitus, type 2) (HCC)  Chronic condition treated with Metformin.  Holding maintenance metformin for 48 hours following intravenous contrast administration.  Insulin sliding scale coverage.     Hypertension  Chronic condition.  Definitive medication reconciliation pending.  Assessment and plan:  80-year-old man status post a ground-level fall after tripping over something in a parking lot.  He does have injuries includin.  C6 cervical fracture-currently in an [place] collar.  Definitive plan is pending.  Dr. Alvarez is consulting  2.  Subdural hemorrhage-no mass-effect.  Baseline of neurologic functioning.  Dr. Alvarez has recommended initial admission to the floor rather than the ICU.  He will be consulting and making definitive recommendations for treatment.     Disposition: [facility].  Trauma tertiary survey.     Critical Care Time: 32 minutes excluding procedures.        ____________________________________     Jitendra Lee M.D.     DD: 2020  6:08 PM          Phoenix Alvarez III, M.D.   Physician   Surgery Neurosurgery     DATE OF SERVICE:   11/05/2020     NEUROSURGICAL INPATIENT CONSULTATION     CHIEF COMPLAINT:  Neck pain, ground level fall, 3-column C6 fracture.     HISTORY OF PRESENT ILLNESS:  This is a 80-year-old man who had a ground level   fall, reports he has had  only mild neck pain, no weakness or numbness.  His   outside hospital CAT scan of the head shows a minimal subdural hematoma   without any midline shift or fracture, but his cervical CT shows a C6 burst   fracture over a C2-C6 anterior cervical disk autofusion with retropulsion,   significant stenosis, and bilateral C6-C7 jumped facet as well as 3-column   fracture.  This is considered unstable.  He has a grade I anterolisthesis.     PAST MEDICAL HISTORY:  Significant for hypertension, hyperlipidemia, prostate   cancer, currently on hormonal treatment     ASSESSMENT AND PLAN:  The patient has an unstable 3-column fracture over a   disk autofusion.  I recommend n.p.o., stat MRI, COVID test.  He should be kept   flat.  We will go today for a C6 anterior corpectomy followed by a few days   rest and likely on Monday, we will do a posterior supplementation due to a   3-column injury and the lever arm that was created by the disk above, but   likely C5-T1 posterior fusion, admitted to the trauma service.  He was kept   flat in a collar.  He is getting a stat MRI, kept n.p.o.     I explained the risks, benefits, and alternatives of the procedure, including   pain, infection, bleeding, CSF leak, failure to completely resolve symptoms,   neurologic deficit including weakness, numbness, bladder or bowel   difficulties, failure of fixation, failure of fusion, need for rostral caudal   extensions due to junctional stenosis, need for posterior fusion, injury to   trachea, carotid, esophagus, temporary or permanent speaking and swallowing   difficulties before surgical scheduling.     Thank you for allow me to participate in his care.        ____________________________________     Phoenix Alvarez  MD Houston HUERTA D.O.   Physician   Physical Medicine & Rehab      Date of Service:  2020  3:14 PM               Phoenix Alvarez III, M.D.   Physician   Surgery Neurosurgery     DATE OF SERVICE:  2020     PREOPERATIVE DIAGNOSES:  1. Cervical spondylosis.  2. Cervical stenosis.  3. Closed cervical C6 fracture.  4. Cervical spondylolisthesis.  5. Cervical stenosis.  6. Diffuse idiopathic skeletal hypertrophy, cervical.     POSTOPERATIVE DIAGNOSES:  1. Cervical spondylosis.  2. Cervical stenosis.  3. Closed cervical C6 fracture.  4. Cervical spondylolisthesis.  5. Cervical stenosis.  6. Diffuse idiopathic skeletal hypertrophy, cervical.     PROCEDURES PERFORMED:  1. C6 anterior corpectomy, decompression of thecal sac.  2. C6 titanium with expandable cage placement.  3. C5, C6, C7 interbody/allograft fusion.  4. C5, C7 anterior plating fixation.  5. Microscopic dissection.  6. Intraoperative monitoring.       Phoenix Alvarez III, MD        DATE OF SERVICE:  2020     PREOPERATIVE DIAGNOSES:  1. Cervical spondylosis.  2. Cervical stenosis.  3. Closed C6 cervical fracture.  4. Cervical instability.  5. Status post C6 corpectomy and anterior fusion several days ago by myself.     POSTOPERATIVE DIAGNOSES:  1. Cervical spondylosis.  2. Cervical stenosis.  3. Closed C6 cervical fracture.  4. Cervical instability.  5. Status post C6 corpectomy and anterior fusion several days ago by myself.     PROCEDURES PERFORMED:  1. C4, C5, C6, C7, T1 laminectomy, decompression of thecal sac and nerve   roots.  2. C4, C5, C6, T1 lateral mass and pedicle screw fixation.  3. C4, C5, C6, C7, T1 posterolateral allograft autograft alexa fusion.  4. Intraoperative monitoring.       ____________________________________     Phoenix Alvarez III, MD      IMAGIN/5 MR C-spine  1.  There is burst fracture of C6 with body with posterior elements. There are also nondisplaced compression fractures of C7 and T1.  2.  There  is discontinuity of anterior and posterior longitudinal ligaments and ligamentum flavum consistent with ligamentous injury.  3.  There is also discontinuity of ligamentum flavum at the level of C1-C2. There is increased T2 signal intensity concerning for ligamentous injury at this level.  4.   There is abnormal T2 hyperintensity in the posterior paraspinal muscles likely representing contusion and edema. There is mild amount of anterior prevertebral hematoma at the levels of C7, T1 and T2.  5.  There is mild amount of posterior lateral epidural hemorrhage noted extending from the skull base to the level of C6. There is anterior epidural hemorrhage at C5 and C6.  6.  At the level of C5-6 and C6-7, there is effacement of the dorsal and ventral subarachnoid spaces secondary to the posterior retropulsion of posterior fracture and epidural hemorrhage. There is severe central canal stenosis at this level. There is no   abnormal intramedullary T2 signal intensity at this level.  7.  Moderate central canal stenosis at the levels of C2-3, C3-4 and C4-5 secondary to the posterior lateral epidural hemorrhage.  8.  There is nonvisualization of the flow void of the right vertebral artery. This is uncertain whether this is a chronic finding or secondary to the vertebral dissection. The left vertebral artery flow-void is widely patent.  9.  There is an approximately 9 mm left thyroid nodule.     Co-morbidities: Please see below.   Potential Risk - Complications: Cognitive Impairment, Deep Vein Thrombosis, Malnutrition, Pain, Pneumonia, Pressure Ulcer and Urinary Tract Infection  Level of Risk: High    Ongoing Medical Management Needed (Medical/Nursing Needs):   Patient Active Problem List    Diagnosis Date Noted   • Oropharyngeal dysphagia 11/09/2020     Priority: High   • Discharge planning issues 11/09/2020     Priority: High   • Injury of vertebral artery, right, initial encounter 11/06/2020     Priority: High   • Traumatic  "subdural hemorrhage (HCC) 11/05/2020     Priority: High   • C6 cervical fracture ([facility]) 11/05/2020     Priority: High   • Syncope and collapse 11/06/2020     Priority: Medium   • DM2 (diabetes mellitus, type 2) ([facility]) 11/05/2020     Priority: Medium   • Traumatic closed nondisplaced fracture of proximal end of left humerus, initial encounter 11/06/2020     Priority: Low   • Trauma 11/05/2020     Priority: Low   • Screening examination for infectious disease 11/05/2020     Priority: Low   • No contraindication to deep vein thrombosis (DVT) prophylaxis 11/05/2020     Priority: Low   • Dyslipidemia      Priority: Low       Current Vital Signs:   Temperature: 36.3 °C (97.3 °F) Pulse: 73 Respiration: 16 Blood Pressure : 148/75  Weight: 99.2 kg (218 lb 11.1 oz) Height: 172.7 cm (5' 8\")  Pulse Oximetry: 99 % O2 (LPM): 2      Completed Laboratory Reports:  Recent Labs     11/08/20  1202 11/08/20  1630 11/08/20  2145 11/09/20  0504 11/09/20  0720 11/09/20  1222 11/09/20  1805 11/10/20  0049 11/10/20  0810 11/10/20  1210 11/10/20  1748 11/10/20  2140 11/11/20  0824   POCGLUCOSE 211* 146* 159* 177*  --  171* 149* 238* 206* 239* 191* 206* 250*   INR  --   --   --   --  1.09  --   --   --   --   --   --   --   --      Additional Labs: Not Applicable    Prior Living Situation:   Housing / Facility: 1 Story House  Steps Into Home: 2  Steps In Home: 0  Lives with - Patient's Self Care Capacity: Spouse  Equipment Owned: 4-Wheel Walker, Hand Held Shower    Prior Level of Function / Living Situation:   Physical Therapy: Prior Services: None  Housing / Facility: 1 Story House  Steps Into Home: 2  Steps In Home: 0  Bathroom Set up: Walk In Shower, Shower Chair(built in bench seat and shower hose)  Equipment Owned: 4-Wheel Walker, Hand Held Shower  Lives with - Patient's Self Care Capacity: Spouse  Bed Mobility: Independent  Transfer Status: Independent  Ambulation: Independent  Distance Ambulation (Feet): 600  Assistive Devices Used: " None  Stairs: Independent  Current Level of Function:   Gait Level Of Assist: Supervised  Assistive Device: Front Wheel Walker  Distance (Feet): 250  Deviation: Bradykinetic  # of Stairs Climbed: 0  Weight Bearing Status: FWB  Comments: up in chair  Sit to Stand: Minimal Assist  Bed, Chair, Wheelchair Transfer: Minimal Assist  Transfer Method: Stand Step  Standing: 15 min total  Occupational Therapy:   Self Feeding: Independent  Grooming / Hygiene: Independent  Bathing: Independent  Dressing: Independent  Toileting: Independent  Medication Management: Independent  Laundry: Independent  Kitchen Mobility: Independent  Finances: Independent  Home Management: Independent  Shopping: Independent  Prior Level Of Mobility: Independent Without Device in Community  Driving / Transportation: Driving Independent  Prior Services: None  Housing / Facility: [address]  Occupation (Pre-Hospital Vocational): Retired Due To Age(former )  Current Level of Function:   Upper Body Dressing: Moderate Assist(for collar managment )  Lower Body Dressing: Minimal Assist  Toileting: Moderate Assist  Speech Language Pathology:   Problem List: Dysphagia, Cognitive-Linguistic Deficits  Diet / Liquid Recommendation: Soft & Bite-Sized (6) - (Dysphagia III), Mildly Thick (2) - ([place]le Thick)  Rehabilitation Prognosis/Potential: Good  Estimated Length of Stay: 10 -14  days    Nursing:   Orientation : Oriented x 4  Continent       Scope/Intensity of Services Recommended:  Physical Therapy: 1 hr / day  5 days / week. Therapeutic Interventions Required: Maximize Endurance, Mobility, Strength and Safety  Occupational Therapy: 1 hr / day 5 days / week. Therapeutic Interventions Required: Maximize Self Care, ADLs, IADLs, Energy Conservation and family training  Speech & Language Pathology: 1 hr / day 5 days / week. Therapeutic Interventions Required: Maximize Cognition, Swallowing, Safety and family training  Rehabilitation Nursing:  24/7. Please see below.   Rehabilitation Physician: 3 - 5 days / week. Therapeutic Interventions Required: Medical Management  Respiratory Care: Eval and Tx. Therapeutic Interventions Required: Per Protocol.  Dietician: Eval and Tx.  Therapeutic Interventions Required:  Per Protocol.     He/She requires 24-hour rehabilitation nursing to manage bowel and bladder function, skin care, surgical incision, nutrition and fluid intake, pain control, safety, medication management and patient/family goals. In addition, rehabilitation nursing will reiterate and reinforce therapy skills and equipment use, including ADLs, as well as provide education to the patient and family. He/She is willing to participate in and is able to tolerate the proposed plan of care.    Rehabilitation Goals and Plan (Expected frequency & duration of treatment in the IRF):   Return to the Community  Anticipated Date of Rehabilitation Admission:  Wednesday 11/11/2020   Patient/Family oriented IRF level of care/facility/plan: Yes  Patient/Family willing to participate in IRF care/facility/plan: YES  Patient able to tolerate IRF level of care proposed: YES  Patient has potential to benefit IRF level of care proposed: yes        Special Needs or Precautions - Medical Necessity:  Precautions: Fall Risk, Spinal / Back Precautions , Cervical Collar    Diet:   DIET ORDERS (From admission to next 24h)     Start     Ordered    11/11/20 1100  Diet Order Diet: Level 7 - Easy to Chew; Liquid level: Level 0 - Thin; Second Modifier: (optional): Consistent CHO (Diabetic); Tray Modifications (optional): No Straws  START AT LUNCH     Question Answer Comment   Diet: Level 7 - Easy to Chew    Liquid level Level 0 - Thin    Second Modifier: (optional) Consistent CHO (Diabetic)    Tray Modifications (optional) No Straws        11/11/20 0909                Anticipated Discharge Destination / Patient/Family Goal:  Destination: Home with Assistance ; [facility] w/ 2 steps to enter  in [place]. +walk in shower w built n bench seat and hand held shower.     Support System: Spouse Gladys Ortiz   Anticipated home health services: OT, PT, SLP, Nursing and Aide  Previously used HH service/ provider: Not Applicable  Anticipated DME Needs: PT has fww @ home.   Outpatient Services: To be determined.     Alternative resources to address additional identified needs:   Follow up w/ PCP Dr Armida Obregon and Dr. Alvarez.   Pre-Screen Completed: 11/11/2020 10:34 AM Karmen Whittington

## 2020-11-11 NOTE — DISCHARGE SUMMARY
Discharge Summary    CHIEF COMPLAINT ON ADMISSION  Chief Complaint   Patient presents with   • Trauma Green     Transfer from [facility] for SAH and C6 fracture       Reason for Admission  80M [facility]     Admission Date  11/5/2020    CODE STATUS  Full Code    HPI & HOSPITAL COURSE  This is a 80 y.o. male here with    []Hide copied text    []Claudia for details     HISTORY OF PRESENT ILLNESS:  This is a 80-year-old man who had a ground level   fall, reported he has had  only mild neck pain, no weakness or numbness.  His outside hospital CAT scan of the head shows a minimal subdural hematoma without any midline shift or fracture, but his cervical CT shows a C6 burst fracture over a C2-C6 anterior cervical disk autofusion with retropulsion, significant stenosis, and bilateral C6-C7 jumped facet as well as 3-column  fracture.  This is considered unstable.  He had a grade I anterolisthesis. He underwent a C6 corpectomy with anterior cervical fusion C5-T1 for C6 burst fracture on 11/5/20 by Dr. Alvarez. After surgery the patient had dysphagia and was placed NPO.  He needed direct decompression as well due to degree of stenosis.   On 11/9/20 the patient underwent 1. C4, C5, C6, C7, T1 laminectomy, decompression of thecal sac and nerve  roots. C4, C5, C6, T1 lateral mass and pedicle screw fixation.C4, C5, C6, C7, T1 posterolateral allograft autograft alexa fusion. There was a CSF leak which was repaired intraoperatively. After the second stage he developed some difficulty breathing. He was given racemic epi and decadron, which improved his condition. He has been continued on decadron since the second stage of surgery. He was seen on post operative day one with improved swallowing, breathing, and strength. He worked with PT/OT and was given a FWW. He continues to require 2lpm of oxygen.   The patient has been cleared by speech therapy for a soft diet and can advance as tolerated.  He will be sent to rehab with pain  medications, home medications, a medrol dosepak, and robaxin.     Therefore, he is discharged in fair and stable condition to an inpatient rehabilitation hospital.    The patient met 2-midnight criteria for an inpatient stay at the time of discharge.    Discharge Date  11/11/20    FOLLOW UP ITEMS POST DISCHARGE  Patient to follow up with Anahy King at our office in TWO WEEKS post op    Keep incision covered for 6-8 days, or until well healed    Leave steri strips in place until 2 week post operative visit    No lifting greater than 10-15 pounds    Keep aspen collar in place to be worn at all times, to be adjusted in office at post operative visit    No bending, twisting of neck     Encourage icing incision and incentive spirometer     Please call with changes in patient condition     DISCHARGE DIAGNOSES  Active Problems:    Traumatic subdural hemorrhage (HCC) POA: Yes    C6 cervical fracture (HCC) POA: Yes    Injury of vertebral artery, right, initial encounter POA: Yes    Oropharyngeal dysphagia POA: Yes    Discharge planning issues POA: Yes    DM2 (diabetes mellitus, type 2) (HCC) POA: Yes    Syncope and collapse POA: Yes    Trauma POA: Yes    Screening examination for infectious disease POA: Yes    No contraindication to deep vein thrombosis (DVT) prophylaxis POA: Yes    Dyslipidemia POA: Yes    Traumatic closed nondisplaced fracture of proximal end of left humerus, initial encounter POA: Yes  Resolved Problems:    * No resolved hospital problems. *      FOLLOW UP  No future appointments.  Phoenix Alvarez III, M.D.  [address] [facility] [id number]  [place] [address]  [phone]    In 2 weeks  Follow up      MEDICATIONS ON DISCHARGE     Medication List      ASK your doctor about these medications      Instructions   albuterol 108 (90 Base) MCG/ACT Aers inhalation aerosol   Inhale 2 Puffs by mouth every 6 hours as needed for Shortness of Breath.  Dose: 2 Puff     Calcium 600 MG Tabs   Take 600 mg by mouth every  morning.  Dose: 600 mg     Januvia 100 MG Tabs  Generic drug: SITagliptin   Take 100 mg by mouth every day.  Dose: 100 mg     lovastatin 40 MG tablet  Commonly known as: MEVACOR   Take 40 mg by mouth every day.  Dose: 40 mg     metFORMIN ER 1000 MG Tb24   Take 1,000 mg by mouth.  Dose: 1,000 mg     MULTIVITAMIN PO   Take 1 Tab by mouth every morning.  Dose: 1 Tab     Trelegy Ellipta 100-62.5-25 MCG/INH Aepb  Generic drug: Fluticasone-Umeclidin-Vilant   Take 1 Puff by mouth every morning.  Dose: 1 Puff     VITAMIN D3 PO   Take 1 Tab by mouth every morning.  Dose: 1 Tab            Allergies  Allergies   Allergen Reactions   • Codeine Anaphylaxis   • Opium Anaphylaxis       DIET  Orders Placed This Encounter   Procedures   • Diet Order Diet: Level 7 - Easy to Chew; Liquid level: Level 0 - Thin; Second Modifier: (optional): Consistent CHO (Diabetic); Tray Modifications (optional): No Straws     Standing Status:   Standing     Number of Occurrences:   1     Order Specific Question:   Diet:     Answer:   Level 7 - Easy to Chew [22]     Order Specific Question:   Liquid level     Answer:   Level 0 - Thin     Order Specific Question:   Second Modifier: (optional)     Answer:   Consistent CHO (Diabetic) [4]     Order Specific Question:   Tray Modifications (optional)     Answer:   No Straws       ACTIVITY  As tolerated and directed by rehab.  Weight bearing as tolerated    CONSULTATIONS  Trauma  RT  PT/OT  Physiatry     PROCEDURES  Two stage cervical surgery including   C5-T1 ACDF with C6 corpectomy  C4-T1 posterior cervical decompression and fusion   Durotomy with intraoperative repair    LABORATORY  Lab Results   Component Value Date    SODIUM 136 11/07/2020    POTASSIUM 4.2 11/07/2020    CHLORIDE 100 11/07/2020    CO2 25 11/07/2020    GLUCOSE 196 (H) 11/07/2020    BUN 19 11/07/2020    CREATININE 0.98 11/07/2020        Lab Results   Component Value Date    WBC 12.0 (H) 11/07/2020    HEMOGLOBIN 12.4 (L) 11/07/2020     HEMATOCRIT 38.1 (L) 11/07/2020    PLATELETCT 173 11/07/2020        Total time of the discharge process exceeds 45 minutes.

## 2020-11-11 NOTE — FLOWSHEET NOTE
11/11/20 1324   Patient History   Pulmonary Diagnosis COPD, RACQUEL   Procedures Relevant to Respiratory Status cervical fracture and surgery   Home O2 No   Nocturnal CPAP Yes   Nocturnal CPAP Oxygen liter flow no   Home Treatments/Frequency Yes   MDI 1/Frequency Albuterol rarely   DPI 1/Frequency   (not sure, wife will bring in)   Sleep Apnea Screening   Have you had a sleep study? Yes   Have you been diagnosed with sleep apnea? Yes   Do you use a CPAP/BIPAP/AUTOPAP? Yes   COPD Risk Screening   Do you have a history of COPD? Yes

## 2020-11-11 NOTE — CARE PLAN
Problem: Communication  Goal: The ability to communicate needs accurately and effectively will improve  Outcome: PROGRESSING AS EXPECTED     Problem: Safety  Goal: Will remain free from injury  Outcome: PROGRESSING AS EXPECTED  Goal: Will remain free from falls  Outcome: PROGRESSING AS EXPECTED     Problem: Infection  Goal: Will remain free from infection  Outcome: PROGRESSING AS EXPECTED     Problem: Venous Thromboembolism (VTW)/Deep Vein Thrombosis (DVT) Prevention:  Goal: Patient will participate in Venous Thrombosis (VTE)/Deep Vein Thrombosis (DVT)Prevention Measures  Outcome: PROGRESSING AS EXPECTED     Problem: Bowel/Gastric:  Goal: Normal bowel function is maintained or improved  Outcome: PROGRESSING AS EXPECTED  Goal: Will not experience complications related to bowel motility  Outcome: PROGRESSING AS EXPECTED     Problem: Knowledge Deficit  Goal: Knowledge of disease process/condition, treatment plan, diagnostic tests, and medications will improve  Outcome: PROGRESSING AS EXPECTED  Goal: Knowledge of the prescribed therapeutic regimen will improve  Outcome: PROGRESSING AS EXPECTED     Problem: Discharge Barriers/Planning  Goal: Patient's continuum of care needs will be met  Outcome: PROGRESSING AS EXPECTED     Problem: Fluid Volume:  Goal: Will maintain balanced intake and output  Outcome: PROGRESSING AS EXPECTED     Problem: Skin Integrity  Goal: Risk for impaired skin integrity will decrease  Outcome: PROGRESSING AS EXPECTED     Problem: Pain Management  Goal: Pain level will decrease to patient's comfort goal  Outcome: PROGRESSING AS EXPECTED     Problem: Respiratory:  Goal: Respiratory status will improve  Outcome: PROGRESSING AS EXPECTED

## 2020-11-11 NOTE — FLOWSHEET NOTE
"   11/11/20 1328   Incentive Spirometry Treatment   Height 1.753 m (5' 9.02\")   Predicted Inspiratory Capacity 2800   60% of predicted IS capacity 1680 mL   40% of predicted IS capacity 1120 mL   Incentive Spirometer Volume 2500 mL     "

## 2020-11-11 NOTE — H&P
REHABILITATION HISTORY AND PHYSICAL/POST ADMISSION EVALUATION    11/11/2020  Isaias Ortiz  RH02/[id number]  Admission  11/11/2020 12:25 PM    PRIMARY REHAB DIAGNOSIS:    This patient is a 80 y.o. male admitted for acute inpatient rehabilitation with reason for admission/[facility] code of 0002.22 - Brain Dysfunction: Traumatic, Closed Injury due to the etiologic diagnosis of subdural hematoma  HPI:  The patient is a 80 y.o. right hand dominant male with a past medical history of hypertension, diabetes, hyperlipidemia, prostate cancer, COPD, RACQUEL on CPAP at night, who was admitted to [facility] on 11/5 after mechanical ground-level fall after tripping over something in the parking lot of a golf course.  He was found to have subdural hematoma and C6 burst fracture.    Patient reportedly did have head strike during fall but with loss of consciousness.  He was initially seen at [facility] and transferred to [facility] [place] [place] for neurosurgical intervention.    His outside CT scan of the cervical spine shows C6 burst fracture over C2-C6 anterior cervical disc autofusion, with retropulsion, significant stenosis and bilateral C6-7 jumped facets, status post phase 1: C5-7 anterior discectomy and fusion 11/5, followed by C4-T1 laminectomy and C4-T1 posterior spinal fusion on 11/9 by Dr. Alvarez.  He was noted to have difficulty swallowing and breathing after the second surgery.  He was given epi and dexamethasone with improvement in ability to breeze.    He was noted to have CSF leak during surgical intervention, this was repaired surgically.    He was reported to have left upper extremity weakness and paresthesias.    Patient was evaluated by physiatry and Physical Therapy, Occupational Therapy and Speech Therapy and determined to be appropriate for acute inpatient rehab and was admitted to [facility] on 11/11    Pre-mobidly, the patient lived in a single level home with spouse.  With this  acute therapeutic intervention, this patient hopes to improve his functional status, and return to independent living with the supportive care of spouse.        REVIEW OF SYSTEMS:     Gen: No fatigue, confusion, significant weight loss  Eyes: no blurry vision, double vision or pain in eyes  ENT: no changes in hearing, runny nose, nose bleeds, sinus pain  CV: No CP, palpitations, symptoms of AUTONOMIC DYSREFLEXIA  Lungs: no shortness of breath, changes in secretions, changes in cough, pain with coughing  Abd: No abd pain, nausea, vomiting, pain with eating  : no blood in urine, pain during storage phase, bladder spasms, suprapubic pain  Ext: No swelling in legs, asymmetric atrophy  Neuro: no changes in strength or sensation  Skin: no new ulcers/skin breakdown appreciated by patient  Mood: No changes in mood today, increase in depression or anxiety  Heme: no bruising, or bleeding  Rest of 14 point review of systems is negative      PMH:  Past Medical History:   Diagnosis Date   • Chronic obstructive pulmonary disease (HCC)    • Diabetes (HCC)    • High cholesterol    • Hypertension    • Prostate cancer (HCC)     past hx       PSH:  Past Surgical History:   Procedure Laterality Date   • PB TUCKER W/O FACETEC FORAMOT/DSKC 1/2 VRT SEG, CE*  11/9/2020    Procedure: LAMINECTOMY, SPINE, CERVICAL, POSTERIOR APPROACH- C4-T1;  Surgeon: Phoenix Alvarez III, M.D.;  Location: [facility];  Service: Neurosurgery   • CERVICAL FUSION POSTERIOR Bilateral 11/9/2020    Procedure: FUSION, SPINE, CERVICAL, POSTERIOR APPROACH- STAGE 2 C4-T1;  Surgeon: Phoenix Alvarez III, M.D.;  Location: [facility];  Service: Neurosurgery   • CERVICAL DISK AND FUSION ANTERIOR  11/5/2020    Procedure: DISCECTOMY, SPINE, CERVICAL, ANTERIOR APPROACH, WITH FUSION C5-7;  Surgeon: Phoenix Alvarez III, M.D.;  Location: [facility];  Service: Neurosurgery   • CORPECTOMY  11/5/2020    Procedure: CORPECTOMY, SPINE C6;  Surgeon: Phoenix Alvarez  III, M.D.;  Location: [facility];  Service: Neurosurgery   • CYST EXCISION      from rectum       FAMILY HISTORY:  No family history of traumatic spinal cord injury traumatic brain injury, rest of family history is noncontributory    MEDICATIONS:  Current Facility-Administered Medications   Medication Dose   • calcium carbonate (TUMS) chewable tab 500 mg  500 mg   • dexamethasone (DECADRON) injection 4 mg  4 mg   • enoxaparin (LOVENOX) inj 40 mg  40 mg   • famotidine (PEPCID) tablet 20 mg  20 mg   • ketorolac (TORADOL) injection 15 mg  15 mg   • levETIRAcetam (KEPPRA) tablet 500 mg  500 mg   • [START ON 11/12/2020] lovastatin (MEVACOR) tablet 40 mg  40 mg   • metFORMIN ER (GLUCOPHAGE XR) tablet 1,000 mg  1,000 mg   • methocarbamol (ROBAXIN) tablet 750 mg  750 mg   • [START ON 11/12/2020] multivitamin (THERAGRAN) tablet 1 Tab  1 Tab   • [START ON 11/12/2020] vitamin D (cholecalciferol) tablet 2,000 Units  2,000 Units   • [START ON 11/12/2020] umeclidinium-vilanterol (ANORO ELLIPTA) inhaler 1 Puff  1 Puff    And   • fluticasone (FLOVENT HFA) 44 MCG/ACT inhaler 88 mcg  2 Puff   • [START ON 11/12/2020] SITagliptin (JANUVIA) tablet 100 mg  100 mg   • albuterol inhaler 2 Puff  2 Puff   • Pharmacy Consult Request ...Pain Management Review 1 Each  1 Each   • Respiratory Therapy Consult     • acetaminophen (TYLENOL) tablet 1,000 mg  1,000 mg   • oxyCODONE immediate-release (ROXICODONE) tablet 5 mg  5 mg   • oxyCODONE immediate release (ROXICODONE) tablet 10 mg  10 mg   • acetaminophen (Tylenol) tablet 650 mg  650 mg   • insulin regular (HumuLIN R,NovoLIN R) injection  2-9 Units    And   • glucose 4 g chewable tablet 16 g  16 g    And   • dextrose 50% (D50W) injection 50 mL  50 mL   • enoxaparin (LOVENOX) inj 40 mg  40 mg   • [START ON 11/12/2020] therapeutic multivitamin-minerals (THERAGRAN-M) tablet 1 Tab  1 Tab   • artificial tears ophthalmic solution 1 Drop  1 Drop   • benzocaine-menthol (CEPACOL) lozenge 1  Lozenge  1 Lozenge   • mag hydrox-al hydrox-simeth (MAALOX PLUS ES or MYLANTA DS) suspension 20 mL  20 mL   • ondansetron (ZOFRAN ODT) dispertab 4 mg  4 mg    Or   • ondansetron (ZOFRAN) syringe/vial injection 4 mg  4 mg   • traZODone (DESYREL) tablet 50 mg  50 mg   • sodium chloride (OCEAN) 0.65 % nasal spray 2 Spray  2 Spray   • midazolam (VERSED) 5 mg/mL (1 mL vial)  5 mg   • [START ON 11/12/2020] lidocaine (LIDODERM) 5 % 2 Patch  2 Patch   • lidocaine (LIDODERM) 5 % 1 Patch  1 Patch   • midodrine (PROAMATINE) tablet 10 mg  10 mg   • sennosides (SENOKOT) 8.6 MG tablet 17.2 mg  17.2 mg    And   • docusate sodium (COLACE) capsule 200 mg  200 mg    And   • bisacodyl (THE MAGIC BULLET) suppository 10 mg  10 mg    And   • [START ON 11/12/2020] magnesium hydroxide (MILK OF MAGNESIA) suspension 30 mL  30 mL   • gabapentin (NEURONTIN) capsule 300 mg  300 mg       ALLERGIES:  Codeine and Opium    PSYCHOSOCIAL HISTORY:  Living Site:  Home  Living With:  spouse  Caregiver's availability:  24/7  Number of stairs:  2  Substance use history:  denies      LEVEL OF FUNCTION PRIOR TO DISABILTY:  Mod Independent, owns front wheel walker    LEVEL OF FUNCTION PRIOR TO ADMISSION to [facility]:  Gait Analysis   Gait Level Of Assist Supervised   Assistive Device Front Wheel Walker   Distance (Feet) 250   # of Times Distance was Traveled 1   Deviation Bradykinetic   # of Stairs Climbed 0   Weight Bearing Status FWB   Bed Mobility    Comments Pt up in chair upon arrival   Functional Mobility   Sit to Stand Minimal Assist   Bed, Chair, Wheelchair Transfer Minimal Assist   Transfer Method Stand Step   Mobility gait in ferrera   Comments Assist for B UE placement and use during STS   Activity Tolerance   Standing 15 min total   Patient / Family Goals    Patient / Family Goal #1 Play golf again           SLP:  11/10:  At this time, would recommend initiation of soft and bite sized diet (SB6) with mildly thick liquids (MT2)  "and close monitoring for any S/Sx of aspiration. Please encourage double swallow.    CURRENT LEVEL OF FUNCTION:   Same as level of function prior to admission to [facility]    PHYSICAL EXAM:     VITAL SIGNS:   height is 1.753 m (5' 9.02\") and weight is 99.3 kg (219 lb). His temporal temperature is 36.8 °C (98.3 °F). His blood pressure is 146/79 and his pulse is 68. His respiration is 18 and oxygen saturation is 94%.     GENERAL: No apparent distress  HEENT: Normocephalic/atraumatic, EOMI, PERRL and No nystagmus  CARDIAC: Regular rate and rhythm, normal S1, S2   LUNGS: Clear to auscultation bilaterally.  Cough force is strong enough to independently clear secretions.  There is no sign of accessory muscle use.    ABDOMINAL: bowel sounds present, soft, nontender and nondistended    EXTREMITIES: no contractures, spasticity, or edema.  No calf tenderness bilaterally, 2+ bilateral DP/PT pulses.    SKIN:  There is no sign of pressure injury or skin breakdown.  The surgical incisions are clean, dry and intact, without signs of dehiscence.      NEURO:    Mental status: follows commands  Speech/language: fluent, no aphasia or dysarthria    CRANIAL NERVES:  2,3: visual acuity grossly intact, PERRL  3,4,6: EOMI bilaterally, no nystagmus or diplopia  5: intact in all branches  7: no facial asymmetry  8: hearing grossly intact  9,10: symmetric palate elevation  11: SCM/Trapezius strength 5/5 bilaterally  12: tongue protrudes midline    Motor:                             Upper Extremity  Myotome R L   Shoulder flexion C5 5 4   Elbow flexion C5 5 5   Wrist extension C6 4 4   Elbow extension C7 5 4   Finger flexion C8 5 5   Finger abduction T1 5 5      Lower Extremity Myotome R L   Hip flexion L2 5 5   Knee extension L3 5 5   Ankle dorsiflexion L4 5 5   Toe extension L5 5 5   Ankle plantarflexion S1 5 5        Sensory: With light touch/pin prick, last normal sensory level is   C4    Anorectal examination: " Sensation in the S4-5 dermatomes is present and Voluntary anal contraction is present       DTRs: 3+ in left biceps, brachioradialis, 2+ right biceps and brachioradialis, 2+ patellar, and Achilles tendons  Babinski: neg bilaterally  Trimble: neg bilaterally    Tone: no spasticity noted      RADIOLOGY:  11/5 MR C-spine  1.  There is burst fracture of C6 with body with posterior elements. There are also nondisplaced compression fractures of C7 and T1.  2.  There is discontinuity of anterior and posterior longitudinal ligaments and ligamentum flavum consistent with ligamentous injury.  3.  There is also discontinuity of ligamentum flavum at the level of C1-C2. There is increased T2 signal intensity concerning for ligamentous injury at this level.  4.   There is abnormal T2 hyperintensity in the posterior paraspinal muscles likely representing contusion and edema. There is mild amount of anterior prevertebral hematoma at the levels of C7, T1 and T2.  5.  There is mild amount of posterior lateral epidural hemorrhage noted extending from the skull base to the level of C6. There is anterior epidural hemorrhage at C5 and C6.  6.  At the level of C5-6 and C6-7, there is effacement of the dorsal and ventral subarachnoid spaces secondary to the posterior retropulsion of posterior fracture and epidural hemorrhage. There is severe central canal stenosis at this level. There is no   abnormal intramedullary T2 signal intensity at this level.  7.  Moderate central canal stenosis at the levels of C2-3, C3-4 and C4-5 secondary to the posterior lateral epidural hemorrhage.  8.  There is nonvisualization of the flow void of the right vertebral artery. This is uncertain whether this is a chronic finding or secondary to the vertebral dissection. The left vertebral artery flow-void is widely patent.  9.  There is an approximately 9 mm left thyroid nodule.      Recent Labs     11/09/20  0720   INR 1.09         IMPAIRMENTS:    Cognitive  ADLs/IADLs  Mobility  Swallow      RELEVANT CHANGES SINCE PREADMISSION EVALUATION:    Status unchanged    The patient's rehabilitation potential is Excellent  The patient's medical prognosis is excellent    PLAN:   Discussion and Recommendations:   1. The patient requires an acute inpatient rehabilitation program with a coordinated program of care at an intensity and frequency not available at a lower level of care. This recommendation is substantiated by the patient's medical physicians who recommend that the patient's intervention and assessment of medical issues needs to be done at an acute level of care for patient's safety and maximum outcome.   2. A coordinated program of care will be supplied by an interdisciplinary team of physical therapy, occupational therapy, rehab physician, rehab nursing, and, if needed, speech therapy and rehab psychology. Rehab team presents a patient-specific rehabilitation and education program concentrating on prevention of future problems related to accessibility, mobility, skin, bowel, bladder, sexuality, and psychosocial and medical/surgical problems.   3. Need for Rehabilitation Physician: The rehab physician will be evaluating the patient on a multi-weekly basis to help coordinate the program of care. The rehab physician communicates between medical physicians, therapists, and nurses to maximize the patient's potential outcome. Specific areas in which the rehab physician will be providing daily assessment include the following:   A. Assessing the patient's heart rate and blood pressure response (vitals monitoring) to activity and making adjustments in medications or conservative measures as needed.   B. The rehab physician will be assessing the frequency at which the program can be increased to allow the patient to reach optimal functional outcome.   C. The rehab physician will also provide assessments in daily skin care, especially in light of patient's  impairments in mobility.   D. The rehab physician will provide special expertise in understanding how to work with functional impairment and recommend appropriate interventions, compensatory techniques, and education that will facilitate the patient's outcome.   4. Rehab R.N.   The rehab RN will be working with patient to carry over in room mobility and activities of daily living when the patient is not in 3 hours of skilled therapy. Rehab nursing will be working in conjunction with rehab physician to address all the medical issues above and continue to assess laboratory work and discuss abnormalities with the treating physicians, assess vitals, and response to activity, and discuss and report abnormalities with the rehab physician. Rehab RN will also continue daily skin care, supervise bladder/bowel program, instruct in medication administration, and ensure patient safety.     5. Therapies to treat at intensity and frequency of (may change after completion of evaluation by all therapeutic disciplines):       PT:  Physical therapy to address mobility, transfer, gait training and evaluation for adaptive equipment needs 1hour/day at least 5 days/week for the duration of the ELOS (see below)       OT:  Occupational therapy to address ADLs, self-care, home management training, functional mobility/transfers and assistive device evaluation, and community re-integration 1hour/day at least 5 days/week for the duration of the ELOS (see below).        ST/Dysphagia:  Speech therapy to address speech, language, and cognitive deficits as well as swallowing difficulties with retraining/dysphagia management and community re-integration with comprehension, expression, cognitive training 1hour/day at least 5 days/week for the duration of the ELOS (see below).     6. 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 Alvarez.  Left upper extremity weakness and paresthesias.  -Dexamethasone 4 mg IV every 6 hours x4 doses  The patient will begin a comprehensive interdisciplinary rehabilitation program.    COPD: will f/u with family on his home inhalers  -Consult respiratory therapy  -Supplemental oxygen as per guidelines  -Anoro Ellipta 1 puff every day  -Flovent 88 mcg every 12 hours    RACQUEL: f/u with CPAP settings  -CPAP on at night, home settings    Diabetes: Type II,  -Januvia 100 mg daily  -Metformin ER 1000 mg every afternoon with meal  -Sliding scale insulin  -FSBG before every meal and nightly    Neurogenic bladder: Monitor PVRs  -Start voiding trial, if can't void in 6 hours or prn check pvr and if >500cc then ICP,if able to void then check PVR, if PVR is >200cc then ICP    Neurogenic bowel:  -Upper motor neuron neurogenic bowel program with Colace 200 mg twice daily, senna 2 tablets q. noon Dulcolax  -Check KUB, to evaluate stool load and colonic dilation    Potential for orthostatic hypotension: With cervical spine injury patient is at high risk for orthostatic hypotension  -Midodrine 5 mg every 4 hours as needed systolic blood pressure less than 100    Dysphagia  Following an anterior approach for cervical spinal fusion, dysphasia is very common. Plan to have the speech-language pathology team continue to evaluate the patient and make recommendations for the proper diet as the patient continues to progress.    Skin  Due to the sensory impairments following spinal cord injury, skin protection principles are of the utmost importance.     Plan to institute an every  two-hour turning pattern overnight while the patient is in bed. When the patient is out of bed, an every 15 minute repositioning or weight shifting pattern will be utilized in order to help train the patient for long-term skin protection. We will also discuss skin monitoring and its importance with the patient and the caregivers.    Pain  #Neuropathic pain:  -Start gabapentin 300 mg 3 times daily    #Acute pain, posttraumatic  -Tylenol 1000 mg 3 times daily  -Oxycodone 5-10 mg every 3 hours as needed pain  -Lidocaine patch either side of posterior neck, on for 12 hours off for 12 hours    Vitamin deficiency  In the posttraumatic setting, there is a high likelihood of vitamin D deficiency. We will plan to check a vitamin D level on admission and supplement as necessary to improve fusion and wound healing.    Nutrition  With the assistance of our dietitian team, we will work to optimize nutrition for wound healing and recovery from spinal cord injury. We will also discussed long-term dietary needs following a spinal injury in order to prevent excessive weight gain in the future.    DVT prophylaxis  In the setting of a traumatic spinal cord injury, the patient is at high risk of deep venous thrombosis. Because of this we have elected to pursue prophylactic anticoagulation utilizing Lovenox 40 mg daily.  This medication may be necessary for up to 3 months depending on the functional status and clinical situation of the patient as the injury evolves.      Estimated discharge: 10-14 days    Verified CODE STATUS as full on admission     I completed medication reconciliation on admission and did not identify any clinically significant medication issues    Admission care coordination time today was 75 min, and entire time spent in face-to-face contact was >50% in counseling and coordination of care as detailed in A/P above.        GOALS/EXPECTED LEVEL OF FUNCTION BASED ON CURRENT MEDICAL AND FUNCTIONAL STATUS (may change  based on patient's medical status and rate of impairment recovery):  Transfers:   Modified Independent  Mobility/Gait:   Modified Independent  ADL's:   Modified Independent  Cognition: Least amount of verbal cues  Swallowing: Least restrictive diet    DISPOSITION: Discharge to pre-morbid independent living setting with the supportive care of patient's spouse.      Wilfredo Chacon D.O.  11/11/2020  12:43 PM

## 2020-11-11 NOTE — PROGRESS NOTES
Neurosurgery Progress Note    Subjective:  Postop day 6 C6 corpectomy with anterior cervical fusion C5-T1 for C6 burst fracture  Postop day 2 C5-T1 posterior fusion      Reports today minimal neck pain  Denies radicular arm pain  Speech following for advancement of diet- patient on softs and thick liquids  Aspen collar worn   Requiring 2lpm of O2    Exam:  Anterior cervical clean dry and intact, no evidence of hematoma or seroma  Posterior cervical with drain in place, no swelling appreciated, minimal output  Motor strength 5/5  Sensation intact  Voice slightly hoarse.   Dysphagia present    BP  Min: 100/66  Max: 136/61  Pulse  Av.9  Min: 66  Max: 89  Resp  Av.9  Min: 15  Max: 19  Temp  Av.4 °C (97.5 °F)  Min: 36.1 °C (97 °F)  Max: 36.6 °C (97.8 °F)  SpO2  Av.4 %  Min: 93 %  Max: 98 %    No data recorded            Recent Labs     20  0720   INR 1.09           Intake/Output       11/10/20 0700 - 20 0659 20 0700 - 20 0659      [id number] [id number] Total [id number] [id number] Total       Intake    Total Intake -- -- -- -- -- --       Output    Drains  40  35 75  --  -- --    Output (mL) (Closed/Suction Drain 1 Posterior Back Kody Johnson 7 Fr.) 40 35 75 -- -- --    Total Output 40 35 75 -- -- --       Net I/O     -40 -35 -75 -- -- --            Intake/Output Summary (Last 24 hours) at 2020 0759  Last data filed at 2020 0400  Gross per 24 hour   Intake --   Output 75 ml   Net -75 ml            • famotidine  20 mg BID   • dexamethasone  4 mg Q6HRS   • Respiratory Therapy Consult   Continuous RT   • calcium carbonate  500 mg QAM   • vitamin D  2,000 Units QAM   • SITagliptin  100 mg DAILY   • metFORMIN ER  1,000 mg PM MEAL   • Pharmacy Consult Request  1 Each PHARMACY TO DOSE   • docusate sodium  100 mg BID   • senna-docusate  1 Tab Nightly   • senna-docusate  1 Tab Q24HRS PRN   • polyethylene glycol/lytes  1 Packet BID PRN   • magnesium hydroxide  30 mL QDAY PRN   •  bisacodyl  10 mg Q24HRS PRN   • fleet  1 Each Once PRN   • NS   Continuous   • enoxaparin  40 mg DAILY AT 1800   • ketorolac  15 mg Q6HRS   • acetaminophen  1,000 mg Q4HRS PRN   • diphenhydrAMINE  25 mg Q6HRS PRN    Or   • diphenhydrAMINE  25 mg Q6HRS PRN   • ondansetron  4 mg Q4HRS PRN   • ondansetron  4 mg Q4HRS PRN   • methocarbamol  750 mg 4X/DAY   • ALPRAZolam  0.5 mg TID PRN   • labetalol  10 mg Q HOUR PRN   • benzocaine-menthol  1 Lozenge Q2HRS PRN   • albuterol  2 Puff Q6HRS PRN   • multivitamin  1 Tab QAM   • lovastatin  40 mg DAILY   • umeclidinium-vilanterol  1 Puff QDAILY (RT)    And   • fluticasone  2 Puff Q12HRS (RT)   • levETIRAcetam  500 mg BID   • insulin regular  2-9 Units 4X/DAY ACHS    And   • glucose  16 g Q15 MIN PRN    And   • dextrose 50%  50 mL Q15 MIN PRN   • Pharmacy Consult Request  1 Each PHARMACY TO DOSE   • MD ALERT...DO NOT ADMINISTER NSAIDS or ASPIRIN unless ORDERED By Neurosurgery  1 Each PRN   • senna-docusate  1 Tab Q24HRS PRN   • fleet  1 Each Once PRN   • methocarbamol  750 mg Q8HRS PRN   • oxyCODONE immediate-release  2.5 mg Q3HRS PRN       Assessment and Plan:  POD #6 C6 corpectomy with anterior cervical fusion C5-T1 for C6 burst fracture  POD #2 C5-T1 posterior fusion     OK to DC hemovac  Speech following for continued diet recommendations, ok to advance as tolerated  Inpatient rehab consult placed  Home health order started for DC planning  FWW and shower chair ordered for patient per PT/OT request  Continue to work with PT/OT  Continue O2 as needed, respiratory therapy to evaluate patient. Possible home O2 needed  Maintain adequate hydration  Continue decadron   Keep head of bed greater than 45 degrees for 5 days due to intraoperative CSF leak   Likely DC home tomorrow or the next day if patient's dysphagia improved and DC care needs are able to be met

## 2020-11-11 NOTE — FLOWSHEET NOTE
11/11/20 1326   Events/Summary/Plan   Events/Summary/Plan RT assessment (wife is going to bring in unopened COPD med which pt prefers to use)   Vital Signs   Pulse 68   Respiration 18   Pulse Oximetry 94 %   $ Pulse Oximetry (Spot Check) Yes   Respiratory Assessment   Level of Consciousness Alert   Breath Sounds   RUL Breath Sounds   (clear throughout)   Oxygen   O2 (LPM) 2   O2 Delivery Device Silicone Nasal Cannula   Smoking History   Have you ever smoked Yes   Have you smoked in the last 12 months No   Confirm Quit Date 11/11/80

## 2020-11-12 LAB
25(OH)D3 SERPL-MCNC: 36 NG/ML (ref 30–100)
ALBUMIN SERPL BCP-MCNC: 3.5 G/DL (ref 3.2–4.9)
ALBUMIN/GLOB SERPL: 1.5 G/DL
ALP SERPL-CCNC: 46 U/L (ref 30–99)
ALT SERPL-CCNC: 14 U/L (ref 2–50)
ANION GAP SERPL CALC-SCNC: 7 MMOL/L (ref 7–16)
AST SERPL-CCNC: 7 U/L (ref 12–45)
BASOPHILS # BLD AUTO: 0.1 % (ref 0–1.8)
BASOPHILS # BLD: 0.01 K/UL (ref 0–0.12)
BILIRUB SERPL-MCNC: 0.4 MG/DL (ref 0.1–1.5)
BUN SERPL-MCNC: 24 MG/DL (ref 8–22)
CALCIUM SERPL-MCNC: 9.1 MG/DL (ref 8.5–10.5)
CHLORIDE SERPL-SCNC: 103 MMOL/L (ref 96–112)
CHOLEST SERPL-MCNC: 129 MG/DL (ref 100–199)
CO2 SERPL-SCNC: 29 MMOL/L (ref 20–33)
COVID ORDER STATUS COVID19: NORMAL
CREAT SERPL-MCNC: 0.86 MG/DL (ref 0.5–1.4)
EOSINOPHIL # BLD AUTO: 0.01 K/UL (ref 0–0.51)
EOSINOPHIL NFR BLD: 0.1 % (ref 0–6.9)
ERYTHROCYTE [DISTWIDTH] IN BLOOD BY AUTOMATED COUNT: 47.4 FL (ref 35.9–50)
EST. AVERAGE GLUCOSE BLD GHB EST-MCNC: 154 MG/DL
GLOBULIN SER CALC-MCNC: 2.4 G/DL (ref 1.9–3.5)
GLUCOSE BLD-MCNC: 177 MG/DL (ref 65–99)
GLUCOSE BLD-MCNC: 189 MG/DL (ref 65–99)
GLUCOSE BLD-MCNC: 200 MG/DL (ref 65–99)
GLUCOSE BLD-MCNC: 209 MG/DL (ref 65–99)
GLUCOSE SERPL-MCNC: 182 MG/DL (ref 65–99)
HBA1C MFR BLD: 7 % (ref 0–5.6)
HCT VFR BLD AUTO: 35 % (ref 42–52)
HDLC SERPL-MCNC: 35 MG/DL
HGB BLD-MCNC: 11.2 G/DL (ref 14–18)
IMM GRANULOCYTES # BLD AUTO: 0.16 K/UL (ref 0–0.11)
IMM GRANULOCYTES NFR BLD AUTO: 1.7 % (ref 0–0.9)
INR PPP: 1.16 (ref 0.87–1.13)
LDLC SERPL CALC-MCNC: 51 MG/DL
LYMPHOCYTES # BLD AUTO: 1.46 K/UL (ref 1–4.8)
LYMPHOCYTES NFR BLD: 15.7 % (ref 22–41)
MAGNESIUM SERPL-MCNC: 1.8 MG/DL (ref 1.5–2.5)
MCH RBC QN AUTO: 29 PG (ref 27–33)
MCHC RBC AUTO-ENTMCNC: 32 G/DL (ref 33.7–35.3)
MCV RBC AUTO: 90.7 FL (ref 81.4–97.8)
MONOCYTES # BLD AUTO: 0.61 K/UL (ref 0–0.85)
MONOCYTES NFR BLD AUTO: 6.6 % (ref 0–13.4)
NEUTROPHILS # BLD AUTO: 7.04 K/UL (ref 1.82–7.42)
NEUTROPHILS NFR BLD: 75.8 % (ref 44–72)
NRBC # BLD AUTO: 0 K/UL
NRBC BLD-RTO: 0 /100 WBC
PHOSPHATE SERPL-MCNC: 3.2 MG/DL (ref 2.5–4.5)
PLATELET # BLD AUTO: 241 K/UL (ref 164–446)
PMV BLD AUTO: 10.4 FL (ref 9–12.9)
POTASSIUM SERPL-SCNC: 4 MMOL/L (ref 3.6–5.5)
PROT SERPL-MCNC: 5.9 G/DL (ref 6–8.2)
PROTHROMBIN TIME: 15.1 SEC (ref 12–14.6)
RBC # BLD AUTO: 3.86 M/UL (ref 4.7–6.1)
SODIUM SERPL-SCNC: 139 MMOL/L (ref 135–145)
T4 FREE SERPL-MCNC: 1.58 NG/DL (ref 0.93–1.7)
TRIGL SERPL-MCNC: 216 MG/DL (ref 0–149)
TSH SERPL DL<=0.005 MIU/L-ACNC: 0.09 UIU/ML (ref 0.38–5.33)
WBC # BLD AUTO: 9.3 K/UL (ref 4.8–10.8)

## 2020-11-12 PROCEDURE — 770010 HCHG ROOM/CARE - REHAB SEMI PRIVAT*

## 2020-11-12 PROCEDURE — 83036 HEMOGLOBIN GLYCOSYLATED A1C: CPT

## 2020-11-12 PROCEDURE — 97161 PT EVAL LOW COMPLEX 20 MIN: CPT

## 2020-11-12 PROCEDURE — 83735 ASSAY OF MAGNESIUM: CPT

## 2020-11-12 PROCEDURE — 94760 N-INVAS EAR/PLS OXIMETRY 1: CPT

## 2020-11-12 PROCEDURE — 700101 HCHG RX REV CODE 250: Performed by: PHYSICAL MEDICINE & REHABILITATION

## 2020-11-12 PROCEDURE — 700111 HCHG RX REV CODE 636 W/ 250 OVERRIDE (IP): Performed by: PHYSICAL MEDICINE & REHABILITATION

## 2020-11-12 PROCEDURE — 97166 OT EVAL MOD COMPLEX 45 MIN: CPT

## 2020-11-12 PROCEDURE — 92523 SPEECH SOUND LANG COMPREHEN: CPT

## 2020-11-12 PROCEDURE — 97530 THERAPEUTIC ACTIVITIES: CPT

## 2020-11-12 PROCEDURE — 82962 GLUCOSE BLOOD TEST: CPT

## 2020-11-12 PROCEDURE — 97535 SELF CARE MNGMENT TRAINING: CPT

## 2020-11-12 PROCEDURE — 84100 ASSAY OF PHOSPHORUS: CPT

## 2020-11-12 PROCEDURE — 99233 SBSQ HOSP IP/OBS HIGH 50: CPT | Performed by: PHYSICAL MEDICINE & REHABILITATION

## 2020-11-12 PROCEDURE — 700102 HCHG RX REV CODE 250 W/ 637 OVERRIDE(OP): Performed by: PHYSICAL MEDICINE & REHABILITATION

## 2020-11-12 PROCEDURE — 92610 EVALUATE SWALLOWING FUNCTION: CPT

## 2020-11-12 PROCEDURE — 80053 COMPREHEN METABOLIC PANEL: CPT

## 2020-11-12 PROCEDURE — A9270 NON-COVERED ITEM OR SERVICE: HCPCS | Performed by: PHYSICAL MEDICINE & REHABILITATION

## 2020-11-12 PROCEDURE — 85610 PROTHROMBIN TIME: CPT

## 2020-11-12 PROCEDURE — U0003 INFECTIOUS AGENT DETECTION BY NUCLEIC ACID (DNA OR RNA); SEVERE ACUTE RESPIRATORY SYNDROME CORONAVIRUS 2 (SARS-COV-2) (CORONAVIRUS DISEASE [COVID-19]), AMPLIFIED PROBE TECHNIQUE, MAKING USE OF HIGH THROUGHPUT TECHNOLOGIES AS DESCRIBED BY CMS-2020-01-R: HCPCS

## 2020-11-12 PROCEDURE — 80061 LIPID PANEL: CPT

## 2020-11-12 PROCEDURE — 85025 COMPLETE CBC W/AUTO DIFF WBC: CPT

## 2020-11-12 PROCEDURE — 36415 COLL VENOUS BLD VENIPUNCTURE: CPT

## 2020-11-12 PROCEDURE — 82306 VITAMIN D 25 HYDROXY: CPT

## 2020-11-12 PROCEDURE — 84439 ASSAY OF FREE THYROXINE: CPT

## 2020-11-12 PROCEDURE — 84443 ASSAY THYROID STIM HORMONE: CPT

## 2020-11-12 RX ORDER — GABAPENTIN 400 MG/1
400 CAPSULE ORAL 3 TIMES DAILY
Status: DISCONTINUED | OUTPATIENT
Start: 2020-11-12 | End: 2020-11-13

## 2020-11-12 RX ORDER — DEXAMETHASONE 4 MG/1
4 TABLET ORAL EVERY 12 HOURS
Status: DISCONTINUED | OUTPATIENT
Start: 2020-11-12 | End: 2020-11-16

## 2020-11-12 RX ORDER — METHOCARBAMOL 750 MG/1
750 TABLET, FILM COATED ORAL 3 TIMES DAILY
Status: DISCONTINUED | OUTPATIENT
Start: 2020-11-12 | End: 2020-11-13

## 2020-11-12 RX ORDER — FLUTICASONE PROPIONATE 44 UG/1
2 AEROSOL, METERED RESPIRATORY (INHALATION) 2 TIMES DAILY
Status: DISCONTINUED | OUTPATIENT
Start: 2020-11-12 | End: 2020-11-20 | Stop reason: HOSPADM

## 2020-11-12 RX ADMIN — LEVETIRACETAM 500 MG: 500 TABLET ORAL at 20:53

## 2020-11-12 RX ADMIN — DOCUSATE SODIUM 200 MG: 100 CAPSULE ORAL at 20:52

## 2020-11-12 RX ADMIN — ACETAMINOPHEN 1000 MG: 500 TABLET, FILM COATED ORAL at 08:11

## 2020-11-12 RX ADMIN — METHOCARBAMOL TABLETS 750 MG: 750 TABLET, COATED ORAL at 20:53

## 2020-11-12 RX ADMIN — DEXAMETHASONE 4 MG: 4 TABLET ORAL at 20:53

## 2020-11-12 RX ADMIN — INSULIN HUMAN 2 UNITS: 100 INJECTION, SOLUTION PARENTERAL at 08:01

## 2020-11-12 RX ADMIN — THERA TABS 1 TABLET: TAB at 08:12

## 2020-11-12 RX ADMIN — LEVETIRACETAM 500 MG: 500 TABLET ORAL at 08:11

## 2020-11-12 RX ADMIN — DEXAMETHASONE 4 MG: 4 TABLET ORAL at 05:49

## 2020-11-12 RX ADMIN — INSULIN HUMAN 2 UNITS: 100 INJECTION, SOLUTION PARENTERAL at 20:58

## 2020-11-12 RX ADMIN — FAMOTIDINE 20 MG: 20 TABLET, FILM COATED ORAL at 20:52

## 2020-11-12 RX ADMIN — Medication 2000 UNITS: at 08:12

## 2020-11-12 RX ADMIN — FLUTICASONE PROPIONATE 88 MCG: 44 AEROSOL, METERED RESPIRATORY (INHALATION) at 08:06

## 2020-11-12 RX ADMIN — GABAPENTIN 400 MG: 400 CAPSULE ORAL at 15:35

## 2020-11-12 RX ADMIN — GABAPENTIN 300 MG: 300 CAPSULE ORAL at 08:12

## 2020-11-12 RX ADMIN — LOVASTATIN 40 MG: 20 TABLET ORAL at 08:11

## 2020-11-12 RX ADMIN — GABAPENTIN 400 MG: 400 CAPSULE ORAL at 20:52

## 2020-11-12 RX ADMIN — SENNOSIDES 17.2 MG: 8.6 TABLET, FILM COATED ORAL at 11:25

## 2020-11-12 RX ADMIN — DOCUSATE SODIUM 200 MG: 100 CAPSULE ORAL at 08:11

## 2020-11-12 RX ADMIN — FAMOTIDINE 20 MG: 20 TABLET, FILM COATED ORAL at 08:11

## 2020-11-12 RX ADMIN — INSULIN HUMAN 3 UNITS: 100 INJECTION, SOLUTION PARENTERAL at 11:28

## 2020-11-12 RX ADMIN — ENOXAPARIN SODIUM 40 MG: 40 INJECTION SUBCUTANEOUS at 17:15

## 2020-11-12 RX ADMIN — BISACODYL 10 MG: 10 SUPPOSITORY RECTAL at 20:53

## 2020-11-12 RX ADMIN — METFORMIN HYDROCHLORIDE 1000 MG: 500 TABLET, EXTENDED RELEASE ORAL at 17:15

## 2020-11-12 RX ADMIN — FLUTICASONE PROPIONATE 88 MCG: 44 AEROSOL, METERED RESPIRATORY (INHALATION) at 20:51

## 2020-11-12 RX ADMIN — INSULIN HUMAN 2 UNITS: 100 INJECTION, SOLUTION PARENTERAL at 17:16

## 2020-11-12 RX ADMIN — ACETAMINOPHEN 1000 MG: 500 TABLET, FILM COATED ORAL at 15:35

## 2020-11-12 RX ADMIN — ACETAMINOPHEN 1000 MG: 500 TABLET, FILM COATED ORAL at 20:53

## 2020-11-12 RX ADMIN — SITAGLIPTIN 100 MG: 50 TABLET, FILM COATED ORAL at 08:11

## 2020-11-12 RX ADMIN — METHOCARBAMOL TABLETS 750 MG: 750 TABLET, COATED ORAL at 15:36

## 2020-11-12 ASSESSMENT — BRIEF INTERVIEW FOR MENTAL STATUS (BIMS)
WHAT YEAR IS IT: CORRECT
ASKED TO RECALL BED: YES, NO CUE REQUIRED
BIMS SUMMARY SCORE: 15
WHAT MONTH IS IT: ACCURATE WITHIN 5 DAYS
ASKED TO RECALL BLUE: YES, NO CUE REQUIRED
WHAT DAY OF THE WEEK IS IT: CORRECT
BIMS SUMMARY SCORE: 11
WHAT MONTH IS IT: ACCURATE WITHIN 5 DAYS
ASKED TO RECALL SOCK: YES, NO CUE REQUIRED
WHAT DAY OF THE WEEK IS IT: CORRECT
INITIAL REPETITION OF BED BLUE SOCK - FIRST ATTEMPT: 3
INITIAL REPETITION OF BED BLUE SOCK - FIRST ATTEMPT: 3
WHAT YEAR IS IT: CORRECT
ASKED TO RECALL BED: YES, NO CUE REQUIRED
ASKED TO RECALL BLUE: NO, COULD NOT RECALL
ASKED TO RECALL SOCK: NO, COULD NOT RECALL

## 2020-11-12 ASSESSMENT — PATIENT HEALTH QUESTIONNAIRE - PHQ9
SUM OF ALL RESPONSES TO PHQ9 QUESTIONS 1 AND 2: 0
2. FEELING DOWN, DEPRESSED, IRRITABLE, OR HOPELESS: NOT AT ALL
1. LITTLE INTEREST OR PLEASURE IN DOING THINGS: NOT AT ALL

## 2020-11-12 ASSESSMENT — GAIT ASSESSMENTS
DISTANCE (FEET): 220
DEVIATION: BRADYKINETIC;INCREASED BASE OF SUPPORT;DECREASED HEEL STRIKE;DECREASED TOE OFF
GAIT LEVEL OF ASSIST: CONTACT GUARD ASSIST

## 2020-11-12 ASSESSMENT — ACTIVITIES OF DAILY LIVING (ADL)
TOILETING: INDEPENDENT
TUB_SHOWER_TRANSFER_DESCRIPTION: GRAB BAR;SHOWER BENCH;INCREASED TIME;SET-UP OF EQUIPMENT;SUPERVISION FOR SAFETY

## 2020-11-12 ASSESSMENT — PAIN DESCRIPTION - PAIN TYPE: TYPE: ACUTE PAIN

## 2020-11-12 NOTE — DISCHARGE PLANNING
CASE MANAGEMENT INITIAL ASSESSMENT    Admit Date:  11/5/2020     I called patient's wife to discuss role of case management / discharge planning / team conference.  The cell phone listed for patient was her phone.  His cell is [phone].  Patient is a  80 y.o. male transferred from [facility].  Wife states he is doing well.  His cerfical fusion was performed by Dr. Alvarez.  Patient's admitting physician for rehab is Dr. Chacon.    Diagnosis: C6 fracture  Trauma    Co-morbidities:   Patient Active Problem List    Diagnosis Date Noted   • Oropharyngeal dysphagia 11/09/2020     Priority: High   • Discharge planning issues 11/09/2020     Priority: High   • Injury of vertebral artery, right, initial encounter 11/06/2020     Priority: High   • Traumatic subdural hemorrhage (HCC) 11/05/2020     Priority: High   • C6 cervical fracture (HCC) 11/05/2020     Priority: High   • Syncope and collapse 11/06/2020     Priority: Medium   • DM2 (diabetes mellitus, type 2) (HCC) 11/05/2020     Priority: Medium   • Traumatic closed nondisplaced fracture of proximal end of left humerus, initial encounter 11/06/2020     Priority: Low   • Trauma 11/05/2020     Priority: Low   • Screening examination for infectious disease 11/05/2020     Priority: Low   • No contraindication to deep vein thrombosis (DVT) prophylaxis 11/05/2020     Priority: Low   • Dyslipidemia      Priority: Low   • Neurogenic bladder 11/11/2020   • Neurogenic bowel 11/11/2020   • Neuropathic pain 11/11/2020   • Acute pain due to trauma 11/11/2020     Prior Living Situation:  Housing / Facility: 1 Story House  Lives with - Patient's Self Care Capacity: Spouse    Prior Level of Function:  Medication Management: Independent  Finances: Independent  Home Management: Independent  Shopping: Independent  Prior Level Of Mobility: Independent Without Device in Community  Driving / Transportation: Driving Independent    Support Systems:  Primary : Wife Gladys Ray at  "[phone]  Other support systems:      Previous Services Utilized:   Equipment Owned: 4-Wheel Walker  Prior Services: None, Home-Independent    Other Information:  Occupation (Pre-Hospital Vocational): Retired Due To Age  Primary Payor Source: Medicare A, Medicare B  Secondary Payor Source: Supplemental Insurance  Primary Care Practitioner : Armida Obregon M.D.  Other MDs: Dr. Alvarez for neurosurgery    Additional Case Management Questions:  Have you ever received case management services for yourself or a family member?  No    Do you feel you have and an understanding of what services  provide?  Explained to patient's wife and she verbalizes understanding.    Do you have any additional questions regarding case management?    \"Do you know when my  will be able to come home?\"  I reviewed team conference and how this will be determined.      CASE MANAGEMENT PLAN OF CARE   Individualized Goals:   1. Would like to have a fww to take home.  2. Will want follow up scheduled with PCP or her PA    Barriers:   1. None at this time.    Patient / Family Goal:  Patient / Family Goal: Patient will return home with his wife.  She is very supportive.  He has 2 entry steps.  He uses C pap at night with no oxygen at home.  He does not have a glucometer or check his blood sugars.  He has a 4ww but his wife states they have been advised that he will need a fww.  We will assist with this.  Will follow.    Plan:  1. Continue to follow patient through hospitalization and provide discharge planning in collaboration with patient, family, physicians and ancillary services.     2. Utilize community resources to ensure a safe discharge.       "

## 2020-11-12 NOTE — FLOWSHEET NOTE
11/11/20 1711   Events/Summary/Plan   Events/Summary/Plan CPAP set up at bedside, no oxygen   Non-Invasive Ventilation [facility]   Nocturnal CPAP or BIPAP CPAP - [facility]   $ System Evaluation Yes   Settings (If Known) 8

## 2020-11-12 NOTE — PROGRESS NOTES
"Rehab Progress Note     Encounter Date: 11/12/2020    CC: LUE weakness and TBI    Interval Events (Subjective)  He reports pins-and-needles burning sensation and left greater than right hands, constant, not progressing up to the elbow or going down the legs.  Does not seem to be worse in the morning or at night, is not waking him up at night, he does report feeling weird sensations in his hands.  No other associated symptoms    He denies any headaches, or blurry vision, he was set up with his CPAP last night.  He is having difficulty with the TV.  Staff reported patient got out of his bed multiple times standing next to the edge of the bed, no falls.    Bowel: Medium soft BM last night with BTP, no incontinence  Bladder: PVRs 15-40, voiding well with no incontinence    ROS:  Gen: No fatigue, confusion, significant weight loss  Eyes: no blurry vision, double vision or pain in eyes  ENT: no changes in hearing, runny nose, nose bleeds, sinus pain  CV: No CP, palpitations, symptoms of AUTONOMIC DYSREFLEXIA  Lungs: no shortness of breath, changes in secretions, changes in cough, pain with coughing  Abd: No abd pain, nausea, vomiting, pain with eating  : no blood in urine, pain during storage phase, bladder spasms, suprapubic pain  Ext: No swelling in legs, asymmetric atrophy  Neuro: no changes in strength or sensation  Skin: no new ulcers/skin breakdown appreciated by patient  Mood: No changes in mood today, increase in depression or anxiety  Heme: no bruising, or bleeding  Rest of 14 point review of systems is negative    Objective:  Vitals: /83   Pulse 65   Temp 36.6 °C (97.9 °F) (Oral)   Resp 18   Ht 1.753 m (5' 9.02\")   Wt 99.3 kg (219 lb)   SpO2 92%   Gen: NAD, Body mass index is 32.33 kg/m².  HEENT:  NC/AT, PERRLA, moist mucous membranes  Cardio: RRR, no mumurs  Pulm: CTAB, with normal respiratory effort  Abd: Soft NTND, active bowel sounds  Ext: No peripheral edema. No calf tenderness. No " clubbing/cyanosis. +dorsal pedalis pulses bilaterally.                              Upper Extremity  Myotome R L   Shoulder flexion C5 5 4   Elbow flexion C5 5 5   Wrist extension C6 4 4   Elbow extension C7 5 4   Finger flexion C8 5 5   Finger abduction T1 5 5          Recent Results (from the past 72 hour(s))   ACCU-CHEK GLUCOSE    Collection Time: 11/09/20 12:22 PM   Result Value Ref Range    Glucose - Accu-Ck 171 (H) 65 - 99 mg/dL   ACCU-CHEK GLUCOSE    Collection Time: 11/09/20  6:05 PM   Result Value Ref Range    Glucose - Accu-Ck 149 (H) 65 - 99 mg/dL   ACCU-CHEK GLUCOSE    Collection Time: 11/10/20 12:49 AM   Result Value Ref Range    Glucose - Accu-Ck 238 (H) 65 - 99 mg/dL   ACCU-CHEK GLUCOSE    Collection Time: 11/10/20  8:10 AM   Result Value Ref Range    Glucose - Accu-Ck 206 (H) 65 - 99 mg/dL   ACCU-CHEK GLUCOSE    Collection Time: 11/10/20 12:10 PM   Result Value Ref Range    Glucose - Accu-Ck 239 (H) 65 - 99 mg/dL   ACCU-CHEK GLUCOSE    Collection Time: 11/10/20  5:48 PM   Result Value Ref Range    Glucose - Accu-Ck 191 (H) 65 - 99 mg/dL   ACCU-CHEK GLUCOSE    Collection Time: 11/10/20  9:40 PM   Result Value Ref Range    Glucose - Accu-Ck 206 (H) 65 - 99 mg/dL   ACCU-CHEK GLUCOSE    Collection Time: 11/11/20  8:24 AM   Result Value Ref Range    Glucose - Accu-Ck 250 (H) 65 - 99 mg/dL   ACCU-CHEK GLUCOSE    Collection Time: 11/11/20 12:54 PM   Result Value Ref Range    Glucose - Accu-Ck 232 (H) 65 - 99 mg/dL   ACCU-CHEK GLUCOSE    Collection Time: 11/11/20  5:27 PM   Result Value Ref Range    Glucose - Accu-Ck 183 (H) 65 - 99 mg/dL   ACCU-CHEK GLUCOSE    Collection Time: 11/11/20  8:19 PM   Result Value Ref Range    Glucose - Accu-Ck 177 (H) 65 - 99 mg/dL   CBC with Differential    Collection Time: 11/12/20  7:07 AM   Result Value Ref Range    WBC 9.3 4.8 - 10.8 K/uL    RBC 3.86 (L) 4.70 - 6.10 M/uL    Hemoglobin 11.2 (L) 14.0 - 18.0 g/dL    Hematocrit 35.0 (L) 42.0 - 52.0 %    MCV 90.7 81.4 - 97.8 fL     MCH 29.0 27.0 - 33.0 pg    MCHC 32.0 (L) 33.7 - 35.3 g/dL    RDW 47.4 35.9 - 50.0 fL    Platelet Count 241 164 - 446 K/uL    MPV 10.4 9.0 - 12.9 fL    Neutrophils-Polys 75.80 (H) 44.00 - 72.00 %    Lymphocytes 15.70 (L) 22.00 - 41.00 %    Monocytes 6.60 0.00 - 13.40 %    Eosinophils 0.10 0.00 - 6.90 %    Basophils 0.10 0.00 - 1.80 %    Immature Granulocytes 1.70 (H) 0.00 - 0.90 %    Nucleated RBC 0.00 /100 WBC    Neutrophils (Absolute) 7.04 1.82 - 7.42 K/uL    Lymphs (Absolute) 1.46 1.00 - 4.80 K/uL    Monos (Absolute) 0.61 0.00 - 0.85 K/uL    Eos (Absolute) 0.01 0.00 - 0.51 K/uL    Baso (Absolute) 0.01 0.00 - 0.12 K/uL    Immature Granulocytes (abs) 0.16 (H) 0.00 - 0.11 K/uL    NRBC (Absolute) 0.00 K/uL   Comp Metabolic Panel (CMP)    Collection Time: 11/12/20  7:07 AM   Result Value Ref Range    Sodium 139 135 - 145 mmol/L    Potassium 4.0 3.6 - 5.5 mmol/L    Chloride 103 96 - 112 mmol/L    Co2 29 20 - 33 mmol/L    Anion Gap 7.0 7.0 - 16.0    Glucose 182 (H) 65 - 99 mg/dL    Bun 24 (H) 8 - 22 mg/dL    Creatinine 0.86 0.50 - 1.40 mg/dL    Calcium 9.1 8.5 - 10.5 mg/dL    AST(SGOT) 7 (L) 12 - 45 U/L    ALT(SGPT) 14 2 - 50 U/L    Alkaline Phosphatase 46 30 - 99 U/L    Total Bilirubin 0.4 0.1 - 1.5 mg/dL    Albumin 3.5 3.2 - 4.9 g/dL    Total Protein 5.9 (L) 6.0 - 8.2 g/dL    Globulin 2.4 1.9 - 3.5 g/dL    A-G Ratio 1.5 g/dL   Lipid Profile (Lipid Panel)    Collection Time: 11/12/20  7:07 AM   Result Value Ref Range    Cholesterol,Tot 129 100 - 199 mg/dL    Triglycerides 216 (H) 0 - 149 mg/dL    HDL 35 (A) >=40 mg/dL    LDL 51 <100 mg/dL   Phosphorus    Collection Time: 11/12/20  7:07 AM   Result Value Ref Range    Phosphorus 3.2 2.5 - 4.5 mg/dL   Magnesium    Collection Time: 11/12/20  7:07 AM   Result Value Ref Range    Magnesium 1.8 1.5 - 2.5 mg/dL   TSH with Reflex to FT4    Collection Time: 11/12/20  7:07 AM   Result Value Ref Range    TSH 0.087 (L) 0.380 - 5.330 uIU/mL   Prothrombin time    Collection Time:  11/12/20  7:07 AM   Result Value Ref Range    PT 15.1 (H) 12.0 - 14.6 sec    INR 1.16 (H) 0.87 - 1.13   Vitamin D, 25-hydroxy (blood)    Collection Time: 11/12/20  7:07 AM   Result Value Ref Range    25-Hydroxy   Vitamin D 25 36 30 - 100 ng/mL   ESTIMATED GFR    Collection Time: 11/12/20  7:07 AM   Result Value Ref Range    GFR If African American >60 >60 mL/min/1.73 m 2    GFR If Non African American >60 >60 mL/min/1.73 m 2   FREE THYROXINE    Collection Time: 11/12/20  7:07 AM   Result Value Ref Range    Free T-4 1.58 0.93 - 1.70 ng/dL       Current Facility-Administered Medications   Medication Frequency   • calcium carbonate (TUMS) chewable tab 500 mg QAM   • enoxaparin (LOVENOX) inj 40 mg DAILY AT 1800   • famotidine (PEPCID) tablet 20 mg BID   • ketorolac (TORADOL) injection 15 mg Q6HRS   • levETIRAcetam (KEPPRA) tablet 500 mg BID   • lovastatin (MEVACOR) tablet 40 mg DAILY   • metFORMIN ER (GLUCOPHAGE XR) tablet 1,000 mg PM MEAL   • methocarbamol (ROBAXIN) tablet 750 mg 4X/DAY   • multivitamin (THERAGRAN) tablet 1 Tab QAM   • vitamin D (cholecalciferol) tablet 2,000 Units QAM   • umeclidinium-vilanterol (ANORO ELLIPTA) inhaler 1 Puff QDAILY (RT)    And   • fluticasone (FLOVENT HFA) 44 MCG/ACT inhaler 88 mcg Q12HRS (RT)   • SITagliptin (JANUVIA) tablet 100 mg DAILY   • albuterol inhaler 2 Puff Q6HRS PRN   • Pharmacy Consult Request ...Pain Management Review 1 Each PHARMACY TO DOSE   • Respiratory Therapy Consult Continuous RT   • acetaminophen (TYLENOL) tablet 1,000 mg TID   • oxyCODONE immediate-release (ROXICODONE) tablet 5 mg Q3HRS PRN   • oxyCODONE immediate release (ROXICODONE) tablet 10 mg Q3HRS PRN   • acetaminophen (Tylenol) tablet 650 mg Q4HRS PRN   • insulin regular (HumuLIN R,NovoLIN R) injection 4X/DAY ACHS    And   • glucose 4 g chewable tablet 16 g Q15 MIN PRN    And   • dextrose 50% (D50W) injection 50 mL Q15 MIN PRN   • therapeutic multivitamin-minerals (THERAGRAN-M) tablet 1 Tab DAILY WITH  LUNCH   • artificial tears ophthalmic solution 1 Drop PRN   • benzocaine-menthol (CEPACOL) lozenge 1 Lozenge Q2HRS PRN   • mag hydrox-al hydrox-simeth (MAALOX PLUS ES or MYLANTA DS) suspension 20 mL Q2HRS PRN   • ondansetron (ZOFRAN ODT) dispertab 4 mg 4X/DAY PRN    Or   • ondansetron (ZOFRAN) syringe/vial injection 4 mg 4X/DAY PRN   • traZODone (DESYREL) tablet 50 mg QHS PRN   • sodium chloride (OCEAN) 0.65 % nasal spray 2 Spray PRN   • midazolam (VERSED) 5 mg/mL (1 mL vial) PRN   • lidocaine (LIDODERM) 5 % 2 Patch Q24HR   • lidocaine (LIDODERM) 5 % 1 Patch Once   • midodrine (PROAMATINE) tablet 10 mg Q4HRS PRN   • sennosides (SENOKOT) 8.6 MG tablet 17.2 mg DAILY AT NOON    And   • docusate sodium (COLACE) capsule 200 mg BID    And   • bisacodyl (THE MAGIC BULLET) suppository 10 mg QDAY    And   • magnesium hydroxide (MILK OF MAGNESIA) suspension 30 mL QDAY PRN   • gabapentin (NEURONTIN) capsule 300 mg TID   • dexamethasone (DECADRON) tablet 4 mg Q6HRS       Orders Placed This Encounter   Procedures   • Diet Order Diet: Level 7 - Easy to Chew; Liquid level: Level 0 - Thin; Second Modifier: (optional): Consistent CHO (Diabetic); Tray Modifications (optional): No Straws     Standing Status:   Standing     Number of Occurrences:   1     Order Specific Question:   Diet:     Answer:   Level 7 - Easy to Chew [22]     Order Specific Question:   Liquid level     Answer:   Level 0 - Thin     Order Specific Question:   Second Modifier: (optional)     Answer:   Consistent CHO (Diabetic) [4]     Order Specific Question:   Tray Modifications (optional)     Answer:   No Straws       Assessment:  Active Hospital Problems    Diagnosis   • *Traumatic subdural hemorrhage (HCC)   • Oropharyngeal dysphagia   • Injury of vertebral artery, right, initial encounter   • C6 cervical fracture (HCC)   • Syncope and collapse   • DM2 (diabetes mellitus, type 2) ([facility])   • Traumatic closed nondisplaced fracture of proximal end of left humerus,  initial encounter   • Dyslipidemia   • Neurogenic bladder   • Neurogenic bowel   • Neuropathic pain   • Acute pain due to trauma       Medical Decision Making and Plan:  TBI: Subdural hematoma, mechanical fall, no fractures  -Nonsurgical management  -Limit Haldol or benzodiazepines as these been shown to alter CNS recovery  -Keppra 500 mg twice daily for posttraumatic seizure prophylaxis, total of 7 days, no seizure activity reported, DC on 11/13  -No need for neuro stimulants at this time  -Continue comprehensive acute inpatient rehabilitation    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 Alvarez.  Left upper extremity weakness and paresthesias.  -Dexamethasone 4 mg IV every 6 hours x4 doses, transitioned to p.o. on 11/11.  Decreased dose to 4 mg every 12 hours on 11/12.   -Continue comprehensive acute inpatient rehabilitation     Agitation: Impulsive behavior, pleasantly confused  -Consider propanolol in the future if impulsivity continues, will discuss with Dr. Rios, brain injury specialist    COPD: will f/u with family on his home inhalers  -Consult respiratory therapy  -Supplemental oxygen as per guidelines  -Anoro Ellipta 1 puff every day  -Flovent 88 mcg every 12 hours    [facility]: f/u with CPAP settings  -CPAP on at night, home settings     Diabetes: Type II,  -Januvia 100 mg daily  -Metformin ER 1000 mg every afternoon with meal  -Sliding scale insulin  -FSBG before every meal and nightly     Neurogenic bladder: Monitor PVRs  -Successful voiding trial with minimal PVRs, less than 50 cc, DC voiding trial    Neurogenic bowel:  -Upper motor neuron neurogenic bowel program with Colace 200 mg twice daily, senna 2 tablets q. noon Dulcolax  -KUB 11/11 shows no significant stool load, moderately hard stool present in the descending colon.     Potential for orthostatic hypotension: With cervical spine injury patient is  at high risk for orthostatic hypotension  -Midodrine 5 mg every 4 hours as needed systolic blood pressure less than 100     Dysphagia  Following an anterior approach for cervical spinal fusion, dysphasia is very common. Plan to have the speech-language pathology team continue to evaluate the patient and make recommendations for the proper diet as the patient continues to progress.     Pain  #Neuropathic pain:  -Increase gabapentin to 400 mg 3 times daily     #Acute pain, posttraumatic  -Tylenol 1000 mg 3 times daily  -Oxycodone 5-10 mg every 3 hours as needed pain  -Lidocaine patch either side of posterior neck, on for 12 hours off for 12 hours     Vitamin D deficiency: Vitamin D level of 36 on admission, goal of greater than 30  -No need for cholecalciferol supplementation at this time     DVT prophylaxis  -Lovenox 40 mg daily.  This medication may be necessary for up to 3 months depending on the functional status and clinical situation of the patient as the injury evolves.        Total time:  36 minutes.  I spent greater than 50% of the time for patient care and coordination on this date, including unit/floor time, and face-to-face time with the patient as per assessment and plan above including neuropathic pain, increase gabapentin to 400 mg 3 times a day, vitamin D deficiency, DC cholecalciferol, agitation, discussed initiating propanolol, neurogenic bladder, DC voiding trial, neurogenic bowel, evaluation of KUB, continue current BTP.    Wilfredo Chacon D.O.

## 2020-11-12 NOTE — THERAPY
"Speech Language Pathology   Initial Assessment     Patient Name: Isaias Ortiz  AGE:  80 y.o., SEX:  male  Medical Record #: [id number]  Today's Date: 11/12/2020     Subjective    The patient is a 80 y.o. admitted with  subdural hematoma and C6 burst fracture and fusion post ground-level fall. Past medical history includes:  hypertension, diabetes, hyperlipidemia, prostate cancer, COPD, RACQUEL on CPAP at night.  Patient lives with spouse and was previously independent for IADL's, although spouse manages finances.   Patient was willing to participate in cognitive linguistic and clinical swallow evaluations.   Objective       11/12/20 0802   Prior Living Situation   Prior Services None;Home-Independent   Housing / Facility 1 Story House   Lives with - Patient's Self Care Capacity Spouse   Prior Level Of Function   Communication Within Functional Limits   Swallow Within Functional Limits   Dentition Edentulous   Dentures Uppers;Lowers   Hearing Impaired Both Ears   Hearing Aid Right;Left   Vision Wears Corrective Lenses;Reading ;Distance   Patient's Primary Language English   Occupation (Pre-Hospital Vocational) Retired Due To Age   Cognition   Moderate Attention Minimal (4)   Verbal Short Term Memory 10 Minutes   Planning / Decision Making Minimal (4)   Executive Functioning / Organization Minimal (4)   Medication Management  To Be Assessed   Cognitive Pattern Assessment   Cognitive Pattern Assessment Used BIMS   Brief Interview for Mental Status (BIMS)   Repetition of Three Words (First Attempt) 3   Temporal Orientation: Year Correct   Temporal Orientation: Month Accurate within 5 days   Temporal Orientation: Day Correct   Recall: \"Sock\" No, could not recall   Recall: \"Blue\" No, could not recall   Recall: \"Bed\" Yes, no cue required   BIMS Summary Score 11   Tracheostomy   Tracheostomy No   Speech Mechanisms / Voice Production   Speech Mechanisms / Voice Production (WDL) WDL   Labial Function   Labial Function (WDL) WDL "   Lingual Function   Lingual Function (WDL) WDL   Jaw Function   Jaw Function (WDL) WDL   Velar Function   Velar Function (WDL) WDL   Laryngeal Function   Laryngeal Function (WDL) WDL   Swallowing   Pureed (4) Within Functional Limits   Thin Liquid Minimal   Masticated Foods Minimal   Dysphagia Strategies / Recommendations   Compensatory Strategies Monitor During Meals;Cough / Clear Wet Vocal Quality Post Swallow;Liquids Via Cup;Multiple Swallows;Single Sips / Bites   Diet / Liquid Recommendation Regular - Easy to Chew (7);Thin (0)   Medication Administration  Whole with Liquid Wash   Therapy Interventions Dysphagia Therapy By Speech Language Pathologist;Therapeutic Dining For Meals   Barriers To Oral Feeding   Barriers To Oral Feeding Impaired Cognition / Attention;Prior Dysphagia   Cervical Ausculatation Not Tested   Pre-Feeding Oral Stimulation Trial Intact   Functional Level of Assist   Eating Supervision   Comprehension Supervision   Comprehension Description Verbal cues;Glasses;Hearing aids/amplifiers   Expression Modified Independent   Social Interaction Modified Independent   Problem Solving Supervision   Memory Minimal Assist   Memory Description Verbal cueing;Therapy schedule;Increased time   SCCAN (Scales of Cognitive and Communicative Ability for Neurorehabilitation)   Oral Expression - Raw Score 19   Oral Expression - Scale Performance Score 100   Orientation - Raw Score 12   Orientation - Scale Performance Score 100   Memory - Raw Score 13   Memory - Scale Performance Score 68   Speech Comprehension - Raw Score 13   Speech Comprehension - Scale Performance Score 100   Reading Comprehension - Raw Score 11   Reading Comprehension - Scale Performance Score 92   Writing - Raw Score 7   Writing - Scale Performance Score 100   Attention - Raw Score 13   Attention - Scale Performance Score 81   Problem Solving - Raw Score 19   Problem Solving - Scale Performance Score 83   SCCAN Total Raw Score 84   SCCAN  Degree of Severity Mild Impairment   Problem List   Problem List Dysphagia;Cognitive-Linguistic Deficits   Current Discharge Plan   Current Discharge Plan Return to Prior Living Situation   Benefit   Therapy Benefit Patient would benefit from Inpatient Rehab Speech-Language Pathology to address above identified deficits.   Strengths & Barriers   Strengths Supportive family;Willingly participates in therapeutic activities;Pleasant and cooperative;Motivated for self care and independence   Speech Language Pathologist Assigned   Assigned SLP / Extension CL/MP 60 tdine, NO SPLIT   SLP Total Time Spent   SLP Individual Total Time Spent (Mins) 90   Evaluation Charges   SLP Speech Language Evaluation Speech Sound Language Comprehension   SLP Oral Pharyngeal Evaluation Oral Pharyngeal Evaluation       Assessment    Patient is 80 y.o. male with a diagnosis of SDH.  Additional factors influencing patient status/progress (ie: cognitive factors, co-morbidities, social support, etc): IPLOF, supportive spouse.    Patient participated in SCCAN with a final raw score of 84 indicating mild cognitive deficits. Difficulties noted in the areas of memory and attention. Recommend SLP to follow cognition.    Clinical swallow evaluation was also completed. Isolated oral mech exam unremarkable. Patient was assessed with current diet textures. Intermittent throat clearing noted with all textures. Patient required minimal cues for use of safe swallow strategies. Recommend  Continue easy to chew diet with thin liquids, t-dine, SLP to follow.     Plan  Recommend Speech Therapy 30-60 minutes per day 5-6 days per week for 1 weeks for the following treatments:  SLP Speech Language Treatment, SLP Swallowing Dysfunction Treatment, SLP Oral Pharyngeal Evaluation, SLP Video Swallow Evaluation, SLP Self Care / ADL Training , SLP Cognitive Skill Development and SLP Group Treatment.    Goals:  Long term and short term goals have been discussed with  patient and they are in agreement.    Speech Therapy Problems     Problem: Memory STGs     Dates: Start: 11/12/20       Goal: STG-Within one week, patient will     Dates: Start: 11/12/20       Description: 1) Individualized goal:  demonstrate use of memory strategies in order to recall daily events 80% of the time.   2) Interventions:  SLP Speech Language Treatment, SLP Self Care / ADL Training , SLP Cognitive Skill Development, and SLP Group Treatment                Problem: Problem Solving STGs     Dates: Start: 11/12/20       Goal: STG-Within one week, patient will solve complex problems     Dates: Start: 11/12/20       Description: 1) Individualized goal:  related to Medication management with 90% accuracy given SPV  2) Interventions:  SLP Speech Language Treatment, SLP Self Care / ADL Training , SLP Cognitive Skill Development, and SLP Group Treatment                Problem: Speech/Swallowing LTGs     Dates: Start: 11/12/20       Goal: LTG-By discharge, patient will solve complex problem     Dates: Start: 11/12/20       Description: 1) Individualized goal:  related to IADL's in order to safely d/c home with mod I  2) Interventions:  SLP Speech Language Treatment, SLP Self Care / ADL Training , SLP Cognitive Skill Development, and SLP Group Treatment

## 2020-11-12 NOTE — CARE PLAN
Problem: Safety  Goal: Will remain free from falls  Outcome: PROGRESSING AS EXPECTED  Intervention: Implement fall precautions  Flowsheets (Taken 11/12/2020 0246)  Bed Alarm: Yes - Alarm On  Environmental Precautions:   Treaded Slipper Socks on Patient   Transferred to Stronger Side   Bed in Low Position  Note: Pt on spinal precautions, C collar on at all times, Safety measures observed , call light in reach, needs anticipated and attended.Pt free from fall and injury.     Problem: Bowel/Gastric:  Goal: Normal bowel function is maintained or improved  Outcome: PROGRESSING AS EXPECTED  Note: Pt on bowel program, with good result , had bm to soft stool in moderate amount.

## 2020-11-12 NOTE — FLOWSHEET NOTE
11/12/20 1543   Events/Summary/Plan   Events/Summary/Plan Begin room air trial, SpO2 check, 7.2 hrs CPAP use last night   Vital Signs   Pulse 66   Respiration 16   Pulse Oximetry 94 %   $ Pulse Oximetry (Spot Check) Yes   Oxygen   O2 Delivery Device Room air w/o2 available

## 2020-11-12 NOTE — THERAPY
Occupational Therapy   Initial Evaluation     Patient Name: Isaias Ortiz  Age:  80 y.o., Sex:  male  Medical Record #: [id number]  Today's Date: 11/12/2020     Subjective    Patient in bed upon arrival. Agreeable to participate in OT. Reports he was independent with all ADLs, IADLs and functional mobility prior to fall/T SCI/TBI.      Objective     11/12/20 0701   Prior Living Situation   Prior Services None;Home-Independent   Housing / Facility 1 Story House  ([place])   Steps Into Home 2   Steps In Home 0   Rail None   Bathroom Set up Walk In Shower  (built-in seat, hand held shower head)   Equipment Owned 4-Wheel Walker  (reports he has 4WW in storage )   Lives with - Patient's Self Care Capacity Spouse   Prior Level of ADL Function   Self Feeding Independent   Grooming / Hygiene Independent   Bathing Independent   Dressing Independent   Toileting Independent   Comments Patient previously independent with all ADLs without AD and AE.    Prior Level of IADL Function   Medication Management Independent   Laundry Independent   Kitchen Mobility Independent   Finances   (spouse manages finances )   Home Management Independent   Shopping Independent   Prior Level Of Mobility Independent Without Device in Community;Independent Without Device in Home   Driving / Transportation Driving Independent   Occupation (Pre-Hospital Vocational) Retired Due To Age  (Retired  )   Leisure Interests Sports  (Enjoys golfing )   Prior Functioning: Everyday Activities   Self Care Independent   Indoor Mobility (Ambulation) Independent   Stairs Independent   Functional Cognition Independent   Prior Device Use None of the given options   Cognition    Level of Consciousness Alert   Cognitive Pattern Assessment   Cognitive Pattern Assessment Used BIMS   Brief Interview for Mental Status (BIMS)   Repetition of Three Words (First Attempt) 3   Temporal Orientation: Year Correct   Temporal Orientation: Month Accurate within 5 days  "  Temporal Orientation: Day Correct   Recall: \"Sock\" Yes, no cue required   Recall: \"Blue\" Yes, no cue required   Recall: \"Bed\" Yes, no cue required   BIMS Summary Score 15   Vision Screen   Vision Not tested   Active ROM Upper Body   Active ROM Upper Body  X   Dominant Hand Right   Comments  Mild decrease in end range shoulder flexion AROM due to neck pain (BUEs)    Strength Upper Body   Upper Body Strength  X   Comments Decreased LUE strength ~3+ to 4/5   Sensation Upper Body   Upper Extremity Sensation  X   Comments Decreased sensation in LUE    Upper Body Muscle Tone   Upper Body Muscle Tone  WDL   Balance Assessment   Sitting Balance (Static) Good   Sitting Balance (Dynamic) Fair +   Bed Mobility    Supine to Sit Contact Guard Assist   Sit to Stand Contact Guard Assist   Scooting Supervised   Rolling Supervised   Coordination Upper Body   Coordination Not Tested   Comments Nor formally assessed however appears mildly impaired in LUE   Eating   Assistance Needed Set-up / clean-up   CARE Score 5   Eating Discharge Goal   Discharge Goal Independent   Oral Hygiene   Assistance Needed Set-up / clean-up   CARE Score 5   Oral Hygiene Discharge Goal   Discharge Goal Independent   Shower/Bathe Self   Assistance Needed Incidental touching   CARE Score 4   Shower/Bathe Self Discharge Goal   Discharge Goal Independent   Upper Body Dressing   Assistance Needed Set-up / clean-up   CARE Score 5   Upper Body Dressing Discharge Goal   Discharge Goal Independent   Lower Body Dressing   Assistance Needed Incidental touching   CARE Score 4   Lower Body Dressing Discharge Goal   Discharge Goal Independent   Putting On/Taking Off Footwear   Assistance Needed Set-up / clean-up   CARE Score 5   Putting On/Taking Off Footwear Discharge Goal   Discharge Goal Independent   Toileting Hygiene   Assistance Needed Incidental touching   CARE Score 4   Toileting Hygiene Discharge Goal   Discharge Goal Independent   Toilet Transfer   Assistance " Needed Incidental touching   CARE Score 4   Toilet Transfer Discharge Goal   Discharge Goal Independent   Hearing, Speech, and Vision   Expression of Ideas and Wants Without difficulty   Understanding Verbal and Non-Verbal Content Understands   Functional Level of Assist   Eating Stand by Assist   Grooming Stand by Assist   Bathing Contact Guard Assist   Upper Body Dressing Stand by Assist   Lower Body Dressing Contact Guard Assist   Toileting Contact Guard Assist   Bed, Chair, Wheelchair Transfer Contact Guard Assist   Toilet Transfers Contact Guard Assist   Tub / Shower Transfers Contact Guard Assist   Tub Shower Transfer Description Grab bar;Shower bench;Increased time;Set-up of equipment;Supervision for safety   Problem List   Problem List Decreased Active Daily Living Skills;Decreased Homemaking Skills;Decreased Upper Extremity Strength Left;Decreased Upper Extremity AROM Left;Decreased Functional Mobility;Decreased Activity Tolerance;Impaired Posture / Trunk Alignment;Impaired Sensation Left Upper Extremity;Impaired Coordination Left Upper Extremity;Impaired Postural Control / Balance;Limited Knowledge of Post Op Precautions   Precautions   Precautions Fall Risk;Spinal / Back Precautions ;Cervical Collar     Comments C-collar on at all times except for shower/meals   Current Discharge Plan   Current Discharge Plan Return to Prior Living Situation   Benefit    Therapy Benefit Patient Would Benefit from Inpatient Rehab Occupational Therapy to Maximize [place] with ADLs, IADLs and Functional Mobility.   Interdisciplinary Plan of Care Collaboration   IDT Collaboration with  Speech Therapist   Patient Position at End of Therapy Seated  (in T-dine w/ ST)   Collaboration Comments Transferred care to ST   Equipment Needs   Assistive Device / DME   (TBA)   Adaptive Equipment   (TBA)   Strengths & Barriers   Strengths Able to follow instructions;Alert and oriented;Effective communication skills;Good balance;Making  steady progress towards goals;Independent prior level of function;Motivated for self care and independence;Pleasant and cooperative;Supportive family;Willingly participates in therapeutic activities   Barriers Generalized weakness;Impaired balance;Home accessibility;Impaired activity tolerance;Limited mobility  (decreased LUE strength, coordination, sensation)   OT Total Time Spent   OT Individual Total Time Spent (Mins) 60   OT Charge Group   OT Self Care / ADL 1   OT Evaluation OT Evaluation Mod       Assessment  Patient is 80 y.o. 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  Additional factors influencing patient status / progress (ie: cognitive factors, co-morbidities, social support, etc): Per H&P, past medical history includes hypertension, diabetes, hyperlipidemia, prostate cancer, COPD, RACQUEL on CPAP at night, who was admitted to [facility] on 11/5 after mechanical ground-level fall after tripping over something in the parking lot of a golf course.  He was found to have subdural hematoma and C6 burst fracture. Patient reportedly did have head strike during fall but with loss of consciousness.  He was initially seen at [facility] and transferred to [facility] for neurosurgical intervention.      Patient presents below functional baseline with primary barriers being decreased balance and impaired LUE strength, sensation, and coordination. Highly motivated to participate in therapy and improve overall functional independence with ADLs, IADLs and functional mobility.      Plan    **Assess  strength and coodination ( box/blocks, 9-hole peg)  Recommend Occupational Therapy  minutes per day 5-7 days per week for 1 week for the following treatments:  OT Orthotics Training, OT Self Care/ADL, OT Community Reintegration, OT Manual Ther Technique, OT Neuro Re-Ed/Balance, OT Sensory Int Techniques, OT Therapeutic Activity, OT Evaluation and OT Therapeutic  Exercise.    Goals:  Long term and short term goals have been discussed with patient and they are in agreement.    Occupational Therapy Goals     Problem: Bathing     Dates: Start: 11/12/20       Goal: STG-Within one week, patient will bathe     Dates: Start: 11/12/20       Description: 1) Individualized Goal:  SBA   2) Interventions:  OT Orthotics Training, OT Self Care/ADL, OT Manual Ther Technique, OT Neuro Re-Ed/Balance, OT Sensory Int Techniques, OT Therapeutic Activity, OT Evaluation, and OT Therapeutic Exercise                  Problem: Dressing     Dates: Start: 11/12/20       Goal: STG-Within one week, patient will dress LB     Dates: Start: 11/12/20       Description: 1) Individualized Goal:  SBA   2) Interventions:  OT Orthotics Training, OT Self Care/ADL, OT Manual Ther Technique, OT Neuro Re-Ed/Balance, OT Sensory Int Techniques, OT Therapeutic Activity, OT Evaluation, and OT Therapeutic Exercise                  Problem: IADL's     Dates: Start: 11/12/20       Goal: STG-Within one week, patient will prepare a meal     Dates: Start: 11/12/20       Description: 1) Individualized Goal:  SBA while standing     2) Interventions:  OT Orthotics Training, OT Self Care/ADL, OT Manual Ther Technique, OT Neuro Re-Ed/Balance, OT Sensory Int Techniques, OT Therapeutic Activity, OT Evaluation, and OT Therapeutic Exercise                  Problem: OT Long Term Goals     Dates: Start: 11/12/20       Goal: LTG-By discharge, patient will complete basic self care tasks     Dates: Start: 11/12/20       Description: 1) Individualized Goal:  Mod I   2) Interventions:  OT Orthotics Training, OT Self Care/ADL, OT Manual Ther Technique, OT Neuro Re-Ed/Balance, OT Sensory Int Techniques, OT Therapeutic Activity, OT Evaluation, and OT Therapeutic Exercise            Goal: LTG-By discharge, patient will perform bathroom transfers     Dates: Start: 11/12/20       Description: 1) Individualized Goal:  Mod I    2) Interventions:  OT  Orthotics Training, OT Self Care/ADL, OT Manual Ther Technique, OT Neuro Re-Ed/Balance, OT Sensory Int Techniques, OT Therapeutic Activity, OT Evaluation, and OT Therapeutic Exercise

## 2020-11-12 NOTE — CARE PLAN
Problem: Discharge Barriers/Planning  Goal: Patient's continuum of care needs will be met  Outcome: PROGRESSING AS EXPECTED  Note: Patient admitted to facility at 1215 via wheelchair; accompanied by hospital transport.  Patient assisted to room and positioned in bed for comfort and safety; call light within reach.  Patient assisted with stowing belongings and oriented to room and facility.  Admission assessment performed and documented in computer.  Admission paperwork completed; signed copies placed in chart.  Will continue to monitor.      Problem: Skin Integrity  Goal: Risk for impaired skin integrity will decrease  Outcome: PROGRESSING AS EXPECTED  Note: 2 RN skin check done with admitting RN and RN Jennie. Face photo and skin photos documented in media. Appropriate LDAs opened.   Pt with Shahzad score of 19, RN wound protocol not indicated, orders current.

## 2020-11-13 LAB
GLUCOSE BLD-MCNC: 143 MG/DL (ref 65–99)
GLUCOSE BLD-MCNC: 178 MG/DL (ref 65–99)
GLUCOSE BLD-MCNC: 186 MG/DL (ref 65–99)
GLUCOSE BLD-MCNC: 187 MG/DL (ref 65–99)
GLUCOSE BLD-MCNC: 209 MG/DL (ref 65–99)
SARS-COV-2 RNA RESP QL NAA+PROBE: NOTDETECTED
SPECIMEN SOURCE: NORMAL

## 2020-11-13 PROCEDURE — 700102 HCHG RX REV CODE 250 W/ 637 OVERRIDE(OP): Performed by: PHYSICAL MEDICINE & REHABILITATION

## 2020-11-13 PROCEDURE — A9270 NON-COVERED ITEM OR SERVICE: HCPCS | Performed by: PHYSICAL MEDICINE & REHABILITATION

## 2020-11-13 PROCEDURE — 97110 THERAPEUTIC EXERCISES: CPT | Mod: CQ

## 2020-11-13 PROCEDURE — 97129 THER IVNTJ 1ST 15 MIN: CPT

## 2020-11-13 PROCEDURE — 97530 THERAPEUTIC ACTIVITIES: CPT

## 2020-11-13 PROCEDURE — 700101 HCHG RX REV CODE 250: Performed by: PHYSICAL MEDICINE & REHABILITATION

## 2020-11-13 PROCEDURE — 97130 THER IVNTJ EA ADDL 15 MIN: CPT

## 2020-11-13 PROCEDURE — 99232 SBSQ HOSP IP/OBS MODERATE 35: CPT | Performed by: PHYSICAL MEDICINE & REHABILITATION

## 2020-11-13 PROCEDURE — 97535 SELF CARE MNGMENT TRAINING: CPT

## 2020-11-13 PROCEDURE — 770010 HCHG ROOM/CARE - REHAB SEMI PRIVAT*

## 2020-11-13 PROCEDURE — 94760 N-INVAS EAR/PLS OXIMETRY 1: CPT

## 2020-11-13 PROCEDURE — 82962 GLUCOSE BLOOD TEST: CPT | Mod: 91

## 2020-11-13 PROCEDURE — 97116 GAIT TRAINING THERAPY: CPT

## 2020-11-13 PROCEDURE — 700111 HCHG RX REV CODE 636 W/ 250 OVERRIDE (IP): Performed by: PHYSICAL MEDICINE & REHABILITATION

## 2020-11-13 RX ORDER — GABAPENTIN 300 MG/1
600 CAPSULE ORAL 3 TIMES DAILY
Status: DISCONTINUED | OUTPATIENT
Start: 2020-11-13 | End: 2020-11-16

## 2020-11-13 RX ORDER — METHOCARBAMOL 500 MG/1
500 TABLET, FILM COATED ORAL 3 TIMES DAILY
Status: DISCONTINUED | OUTPATIENT
Start: 2020-11-13 | End: 2020-11-16

## 2020-11-13 RX ADMIN — THERA TABS 1 TABLET: TAB at 08:35

## 2020-11-13 RX ADMIN — DEXAMETHASONE 4 MG: 4 TABLET ORAL at 08:35

## 2020-11-13 RX ADMIN — ACETAMINOPHEN 1000 MG: 500 TABLET, FILM COATED ORAL at 08:34

## 2020-11-13 RX ADMIN — DOCUSATE SODIUM 200 MG: 100 CAPSULE ORAL at 21:00

## 2020-11-13 RX ADMIN — FAMOTIDINE 20 MG: 20 TABLET, FILM COATED ORAL at 08:35

## 2020-11-13 RX ADMIN — METHOCARBAMOL 500 MG: 500 TABLET, FILM COATED ORAL at 20:59

## 2020-11-13 RX ADMIN — GABAPENTIN 600 MG: 300 CAPSULE ORAL at 21:00

## 2020-11-13 RX ADMIN — BISACODYL 10 MG: 10 SUPPOSITORY RECTAL at 20:00

## 2020-11-13 RX ADMIN — FLUTICASONE PROPIONATE 88 MCG: 44 AEROSOL, METERED RESPIRATORY (INHALATION) at 20:00

## 2020-11-13 RX ADMIN — DEXAMETHASONE 4 MG: 4 TABLET ORAL at 20:59

## 2020-11-13 RX ADMIN — DOCUSATE SODIUM 200 MG: 100 CAPSULE ORAL at 08:35

## 2020-11-13 RX ADMIN — ACETAMINOPHEN 1000 MG: 500 TABLET, FILM COATED ORAL at 20:59

## 2020-11-13 RX ADMIN — METFORMIN HYDROCHLORIDE 1000 MG: 500 TABLET, EXTENDED RELEASE ORAL at 17:30

## 2020-11-13 RX ADMIN — GABAPENTIN 600 MG: 300 CAPSULE ORAL at 15:07

## 2020-11-13 RX ADMIN — FLUTICASONE PROPIONATE 88 MCG: 44 AEROSOL, METERED RESPIRATORY (INHALATION) at 08:40

## 2020-11-13 RX ADMIN — METHOCARBAMOL 500 MG: 500 TABLET, FILM COATED ORAL at 15:07

## 2020-11-13 RX ADMIN — INSULIN HUMAN 2 UNITS: 100 INJECTION, SOLUTION PARENTERAL at 21:04

## 2020-11-13 RX ADMIN — LEVETIRACETAM 500 MG: 500 TABLET ORAL at 08:35

## 2020-11-13 RX ADMIN — SENNOSIDES 17.2 MG: 8.6 TABLET, FILM COATED ORAL at 12:21

## 2020-11-13 RX ADMIN — CALCIUM CARBONATE (ANTACID) CHEW TAB 500 MG 500 MG: 500 CHEW TAB at 08:35

## 2020-11-13 RX ADMIN — FAMOTIDINE 20 MG: 20 TABLET, FILM COATED ORAL at 20:59

## 2020-11-13 RX ADMIN — INSULIN HUMAN 3 UNITS: 100 INJECTION, SOLUTION PARENTERAL at 11:21

## 2020-11-13 RX ADMIN — UMECLIDINIUM BROMIDE AND VILANTEROL TRIFENATATE 1 PUFF: 62.5; 25 POWDER RESPIRATORY (INHALATION) at 09:38

## 2020-11-13 RX ADMIN — LIDOCAINE 2 PATCH: 50 PATCH TOPICAL at 08:36

## 2020-11-13 RX ADMIN — SITAGLIPTIN 100 MG: 50 TABLET, FILM COATED ORAL at 08:35

## 2020-11-13 RX ADMIN — INSULIN HUMAN 2 UNITS: 100 INJECTION, SOLUTION PARENTERAL at 17:32

## 2020-11-13 RX ADMIN — METHOCARBAMOL TABLETS 750 MG: 750 TABLET, COATED ORAL at 08:35

## 2020-11-13 RX ADMIN — LOVASTATIN 40 MG: 20 TABLET ORAL at 08:35

## 2020-11-13 RX ADMIN — ACETAMINOPHEN 1000 MG: 500 TABLET, FILM COATED ORAL at 15:07

## 2020-11-13 RX ADMIN — MULTIPLE VITAMINS W/ MINERALS TAB 1 TABLET: TAB at 12:21

## 2020-11-13 RX ADMIN — ENOXAPARIN SODIUM 40 MG: 40 INJECTION SUBCUTANEOUS at 18:01

## 2020-11-13 RX ADMIN — GABAPENTIN 400 MG: 400 CAPSULE ORAL at 08:35

## 2020-11-13 ASSESSMENT — GAIT ASSESSMENTS
ASSISTIVE DEVICE: FRONT WHEEL WALKER
GAIT LEVEL OF ASSIST: CONTACT GUARD ASSIST
DEVIATION: ATAXIC;INCREASED BASE OF SUPPORT;BRADYKINETIC;DECREASED HEEL STRIKE
DISTANCE (FEET): 375

## 2020-11-13 ASSESSMENT — PAIN DESCRIPTION - PAIN TYPE
TYPE: ACUTE PAIN
TYPE: ACUTE PAIN

## 2020-11-13 ASSESSMENT — ACTIVITIES OF DAILY LIVING (ADL)
BED_CHAIR_WHEELCHAIR_TRANSFER_DESCRIPTION: OTHER (COMMENT)
TUB_SHOWER_TRANSFER_DESCRIPTION: GRAB BAR;SHOWER BENCH;SET-UP OF EQUIPMENT;SUPERVISION FOR SAFETY
BED_CHAIR_WHEELCHAIR_TRANSFER_DESCRIPTION: SET-UP OF EQUIPMENT;SUPERVISION FOR SAFETY

## 2020-11-13 NOTE — PROGRESS NOTES
"Rehab Progress Note     Encounter Date: 11/13/2020    CC: LUE weakness and TBI    Interval Events (Subjective)    He reports he feels much better today, improving memory, improving strength in the left upper extremity.  He is very happy with the current level of care.    Staff reporting he was found sitting on the floor in the bathroom.  He reports he did not fall he just sat down.  He denies any pain and lower extremities denies hitting his head.  No episodes of seizures    Bowel: Large formed BM 11/13  Bladder: Volitional voiding, no incontinence, no frequency or urgency    ROS:  Gen: No fatigue, confusion, significant weight loss  Eyes: no blurry vision, double vision or pain in eyes  ENT: no changes in hearing, runny nose, nose bleeds, sinus pain  CV: No CP, palpitations, symptoms of AUTONOMIC DYSREFLEXIA  Lungs: no shortness of breath, changes in secretions, changes in cough, pain with coughing  Abd: No abd pain, nausea, vomiting, pain with eating  : no blood in urine, pain during storage phase, bladder spasms, suprapubic pain  Ext: No swelling in legs, asymmetric atrophy  Neuro: no changes in strength or sensation  Skin: no new ulcers/skin breakdown appreciated by patient  Mood: No changes in mood today, increase in depression or anxiety  Heme: no bruising, or bleeding  Rest of 14 point review of systems is negative    Objective:  Vitals: /73   Pulse 63   Temp 36.2 °C (97.1 °F) (Temporal)   Resp 18   Ht 1.753 m (5' 9.02\")   Wt 99.3 kg (219 lb)   SpO2 93%   Gen: NAD, Body mass index is 32.33 kg/m².  HEENT: NC/AT, PERRLA, moist mucous membranes, Aspen collar in place, patient currently wearing mask  Cardio: RRR, no mumurs  Pulm: CTAB, with normal respiratory effort  Abd: Soft NTND, active bowel sounds  Ext: No peripheral edema. No calf tenderness. No clubbing/cyanosis. +dorsal pedalis pulses bilaterally.                                Upper Extremity  Myotome R L   Shoulder flexion C5 5 4   Elbow " flexion C5 5 5   Wrist extension C6 4 4   Elbow extension C7 5 4   Finger flexion C8 5 5   Finger abduction T1 5 5          Recent Results (from the past 72 hour(s))   ACCU-CHEK GLUCOSE    Collection Time: 11/10/20 12:10 PM   Result Value Ref Range    Glucose - Accu-Ck 239 (H) 65 - 99 mg/dL   ACCU-CHEK GLUCOSE    Collection Time: 11/10/20  5:48 PM   Result Value Ref Range    Glucose - Accu-Ck 191 (H) 65 - 99 mg/dL   ACCU-CHEK GLUCOSE    Collection Time: 11/10/20  9:40 PM   Result Value Ref Range    Glucose - Accu-Ck 206 (H) 65 - 99 mg/dL   ACCU-CHEK GLUCOSE    Collection Time: 11/11/20  8:24 AM   Result Value Ref Range    Glucose - Accu-Ck 250 (H) 65 - 99 mg/dL   ACCU-CHEK GLUCOSE    Collection Time: 11/11/20 12:54 PM   Result Value Ref Range    Glucose - Accu-Ck 232 (H) 65 - 99 mg/dL   ACCU-CHEK GLUCOSE    Collection Time: 11/11/20  5:27 PM   Result Value Ref Range    Glucose - Accu-Ck 183 (H) 65 - 99 mg/dL   ACCU-CHEK GLUCOSE    Collection Time: 11/11/20  8:19 PM   Result Value Ref Range    Glucose - Accu-Ck 177 (H) 65 - 99 mg/dL   CBC with Differential    Collection Time: 11/12/20  7:07 AM   Result Value Ref Range    WBC 9.3 4.8 - 10.8 K/uL    RBC 3.86 (L) 4.70 - 6.10 M/uL    Hemoglobin 11.2 (L) 14.0 - 18.0 g/dL    Hematocrit 35.0 (L) 42.0 - 52.0 %    MCV 90.7 81.4 - 97.8 fL    MCH 29.0 27.0 - 33.0 pg    MCHC 32.0 (L) 33.7 - 35.3 g/dL    RDW 47.4 35.9 - 50.0 fL    Platelet Count 241 164 - 446 K/uL    MPV 10.4 9.0 - 12.9 fL    Neutrophils-Polys 75.80 (H) 44.00 - 72.00 %    Lymphocytes 15.70 (L) 22.00 - 41.00 %    Monocytes 6.60 0.00 - 13.40 %    Eosinophils 0.10 0.00 - 6.90 %    Basophils 0.10 0.00 - 1.80 %    Immature Granulocytes 1.70 (H) 0.00 - 0.90 %    Nucleated RBC 0.00 /100 WBC    Neutrophils (Absolute) 7.04 1.82 - 7.42 K/uL    Lymphs (Absolute) 1.46 1.00 - 4.80 K/uL    Monos (Absolute) 0.61 0.00 - 0.85 K/uL    Eos (Absolute) 0.01 0.00 - 0.51 K/uL    Baso (Absolute) 0.01 0.00 - 0.12 K/uL    Immature  Granulocytes (abs) 0.16 (H) 0.00 - 0.11 K/uL    NRBC (Absolute) 0.00 K/uL   Comp Metabolic Panel (CMP)    Collection Time: 11/12/20  7:07 AM   Result Value Ref Range    Sodium 139 135 - 145 mmol/L    Potassium 4.0 3.6 - 5.5 mmol/L    Chloride 103 96 - 112 mmol/L    Co2 29 20 - 33 mmol/L    Anion Gap 7.0 7.0 - 16.0    Glucose 182 (H) 65 - 99 mg/dL    Bun 24 (H) 8 - 22 mg/dL    Creatinine 0.86 0.50 - 1.40 mg/dL    Calcium 9.1 8.5 - 10.5 mg/dL    AST(SGOT) 7 (L) 12 - 45 U/L    ALT(SGPT) 14 2 - 50 U/L    Alkaline Phosphatase 46 30 - 99 U/L    Total Bilirubin 0.4 0.1 - 1.5 mg/dL    Albumin 3.5 3.2 - 4.9 g/dL    Total Protein 5.9 (L) 6.0 - 8.2 g/dL    Globulin 2.4 1.9 - 3.5 g/dL    A-G Ratio 1.5 g/dL   HEMOGLOBIN A1C    Collection Time: 11/12/20  7:07 AM   Result Value Ref Range    Glycohemoglobin 7.0 (H) 0.0 - 5.6 %    Est Avg Glucose 154 mg/dL   Lipid Profile (Lipid Panel)    Collection Time: 11/12/20  7:07 AM   Result Value Ref Range    Cholesterol,Tot 129 100 - 199 mg/dL    Triglycerides 216 (H) 0 - 149 mg/dL    HDL 35 (A) >=40 mg/dL    LDL 51 <100 mg/dL   Phosphorus    Collection Time: 11/12/20  7:07 AM   Result Value Ref Range    Phosphorus 3.2 2.5 - 4.5 mg/dL   Magnesium    Collection Time: 11/12/20  7:07 AM   Result Value Ref Range    Magnesium 1.8 1.5 - 2.5 mg/dL   TSH with Reflex to FT4    Collection Time: 11/12/20  7:07 AM   Result Value Ref Range    TSH 0.087 (L) 0.380 - 5.330 uIU/mL   Prothrombin time    Collection Time: 11/12/20  7:07 AM   Result Value Ref Range    PT 15.1 (H) 12.0 - 14.6 sec    INR 1.16 (H) 0.87 - 1.13   Vitamin D, 25-hydroxy (blood)    Collection Time: 11/12/20  7:07 AM   Result Value Ref Range    25-Hydroxy   Vitamin D 25 36 30 - 100 ng/mL   ESTIMATED GFR    Collection Time: 11/12/20  7:07 AM   Result Value Ref Range    GFR If African American >60 >60 mL/min/1.73 m 2    GFR If Non African American >60 >60 mL/min/1.73 m 2   FREE THYROXINE    Collection Time: 11/12/20  7:07 AM   Result  Value Ref Range    Free T-4 1.58 0.93 - 1.70 ng/dL   ACCU-CHEK GLUCOSE    Collection Time: 11/12/20  7:59 AM   Result Value Ref Range    Glucose - Accu-Ck 189 (H) 65 - 99 mg/dL   ACCU-CHEK GLUCOSE    Collection Time: 11/12/20 11:26 AM   Result Value Ref Range    Glucose - Accu-Ck 209 (H) 65 - 99 mg/dL   COVID/SARS CoV-2 PCR    Collection Time: 11/12/20 12:43 PM    Specimen: Nasopharyngeal; Respirate   Result Value Ref Range    COVID Order Status Received    SARS-CoV-2, PCR (In-House)    Collection Time: 11/12/20 12:43 PM   Result Value Ref Range    SARS-CoV-2 Source NP Swab     SARS-CoV-2 by PCR NotDetected    ACCU-CHEK GLUCOSE    Collection Time: 11/12/20  5:14 PM   Result Value Ref Range    Glucose - Accu-Ck 200 (H) 65 - 99 mg/dL   ACCU-CHEK GLUCOSE    Collection Time: 11/12/20  8:52 PM   Result Value Ref Range    Glucose - Accu-Ck 178 (H) 65 - 99 mg/dL   ACCU-CHEK GLUCOSE    Collection Time: 11/13/20  7:28 AM   Result Value Ref Range    Glucose - Accu-Ck 143 (H) 65 - 99 mg/dL       Current Facility-Administered Medications   Medication Frequency   • dexamethasone (DECADRON) tablet 4 mg Q12HRS   • methocarbamol (ROBAXIN) tablet 750 mg TID   • gabapentin (NEURONTIN) capsule 400 mg TID   • umeclidinium-vilanterol (ANORO ELLIPTA) inhaler 1 Puff Daily-0800    And   • fluticasone (FLOVENT HFA) 44 MCG/ACT inhaler 88 mcg BID   • calcium carbonate (TUMS) chewable tab 500 mg QAM   • enoxaparin (LOVENOX) inj 40 mg DAILY AT 1800   • famotidine (PEPCID) tablet 20 mg BID   • levETIRAcetam (KEPPRA) tablet 500 mg BID   • lovastatin (MEVACOR) tablet 40 mg DAILY   • metFORMIN ER (GLUCOPHAGE XR) tablet 1,000 mg PM MEAL   • multivitamin (THERAGRAN) tablet 1 Tab QAM   • SITagliptin (JANUVIA) tablet 100 mg DAILY   • albuterol inhaler 2 Puff Q6HRS PRN   • Pharmacy Consult Request ...Pain Management Review 1 Each PHARMACY TO DOSE   • Respiratory Therapy Consult Continuous RT   • acetaminophen (TYLENOL) tablet 1,000 mg TID   • oxyCODONE  immediate-release (ROXICODONE) tablet 5 mg Q3HRS PRN   • oxyCODONE immediate release (ROXICODONE) tablet 10 mg Q3HRS PRN   • acetaminophen (Tylenol) tablet 650 mg Q4HRS PRN   • insulin regular (HumuLIN R,NovoLIN R) injection 4X/DAY ACHS    And   • glucose 4 g chewable tablet 16 g Q15 MIN PRN    And   • dextrose 50% (D50W) injection 50 mL Q15 MIN PRN   • therapeutic multivitamin-minerals (THERAGRAN-M) tablet 1 Tab DAILY WITH LUNCH   • artificial tears ophthalmic solution 1 Drop PRN   • benzocaine-menthol (CEPACOL) lozenge 1 Lozenge Q2HRS PRN   • mag hydrox-al hydrox-simeth (MAALOX PLUS ES or MYLANTA DS) suspension 20 mL Q2HRS PRN   • ondansetron (ZOFRAN ODT) dispertab 4 mg 4X/DAY PRN    Or   • ondansetron (ZOFRAN) syringe/vial injection 4 mg 4X/DAY PRN   • traZODone (DESYREL) tablet 50 mg QHS PRN   • sodium chloride (OCEAN) 0.65 % nasal spray 2 Spray PRN   • midazolam (VERSED) 5 mg/mL (1 mL vial) PRN   • lidocaine (LIDODERM) 5 % 2 Patch Q24HR   • midodrine (PROAMATINE) tablet 10 mg Q4HRS PRN   • sennosides (SENOKOT) 8.6 MG tablet 17.2 mg DAILY AT NOON    And   • docusate sodium (COLACE) capsule 200 mg BID    And   • bisacodyl (THE MAGIC BULLET) suppository 10 mg QDAY    And   • magnesium hydroxide (MILK OF MAGNESIA) suspension 30 mL QDAY PRN       Orders Placed This Encounter   Procedures   • Diet Order Diet: Level 7 - Easy to Chew; Liquid level: Level 0 - Thin; Second Modifier: (optional): Consistent CHO (Diabetic); Tray Modifications (optional): No Straws     Standing Status:   Standing     Number of Occurrences:   1     Order Specific Question:   Diet:     Answer:   Level 7 - Easy to Chew [22]     Order Specific Question:   Liquid level     Answer:   Level 0 - Thin     Order Specific Question:   Second Modifier: (optional)     Answer:   Consistent CHO (Diabetic) [4]     Order Specific Question:   Tray Modifications (optional)     Answer:   No Straws       Assessment:  Active Hospital Problems    Diagnosis   •  *Traumatic subdural hemorrhage ([facility])   • Oropharyngeal dysphagia   • Injury of vertebral artery, right, initial encounter   • C6 cervical fracture ([facility])   • Syncope and collapse   • DM2 (diabetes mellitus, type 2) ([facility])   • Traumatic closed nondisplaced fracture of proximal end of left humerus, initial encounter   • Dyslipidemia   • Neurogenic bladder   • Neurogenic bowel   • Neuropathic pain   • Acute pain due to trauma       Medical Decision Making and Plan:  TBI: Subdural hematoma, mechanical fall, no fractures  -Nonsurgical management  -Limit Haldol or benzodiazepines as these been shown to alter CNS recovery  -Keppra 500 mg twice daily for posttraumatic seizure prophylaxis, total of 7 days, no seizure activity reported, DC Keppra as no episodes of seizure  -No need for neuro stimulants at this time  -Continue comprehensive acute inpatient rehabilitation    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 Alvarez.  Left upper extremity weakness and paresthesias.  -Dexamethasone 4 mg IV every 6 hours x4 doses, transitioned to p.o. on 11/11.  Decreased dose to 4 mg every 12 hours on 11/12.   -Continue comprehensive acute inpatient rehabilitation     Agitation: Impulsive behavior, pleasantly confused  -Consider propanolol in the future if impulsivity continues, will discuss with Dr. Rios, brain injury specialist    COPD: will f/u with family on his home inhalers  -Consult respiratory therapy  -Supplemental oxygen as per guidelines  -Anoro Ellipta 1 puff every day  -Flovent 88 mcg every 12 hours    RACQUEL: f/u with CPAP settings  -CPAP on at night, home settings     Diabetes: Type II, With hyperglycemia, worsened by dexamethasone  -Januvia 100 mg daily  -Metformin ER 1000 mg every afternoon with meal  -Sliding scale insulin  -FSBG before every meal and nightly     Neurogenic bladder: Monitor PVRs  -Successful voiding trial  with minimal PVRs, less than 50 cc, DC voiding trial    Neurogenic bowel:  -Upper motor neuron neurogenic bowel program with Colace 200 mg twice daily, senna 2 tablets q. noon Dulcolax  -KUB 11/11 shows no significant stool load, moderately hard stool present in the descending colon.     Potential for orthostatic hypotension: With cervical spine injury patient is at high risk for orthostatic hypotension  -Midodrine 5 mg every 4 hours as needed systolic blood pressure less than 100     Dysphagia  Following an anterior approach for cervical spinal fusion, dysphasia is very common. Plan to have the speech-language pathology team continue to evaluate the patient and make recommendations for the proper diet as the patient continues to progress.    Pain  #Neuropathic pain:  -Increase gabapentin to 600 mg 3 times a day     #Acute pain, posttraumatic  -Tylenol 1000 mg 3 times daily  -Oxycodone 5-10 mg every 3 hours as needed pain  -Lidocaine patch either side of posterior neck, on for 12 hours off for 12 hours  -Decrease Robaxin to 500 mg 3 times daily, could alter cognitive status    Vitamin D deficiency: Vitamin D level of 36 on admission, goal of greater than 30  -No need for cholecalciferol supplementation at this time     DVT prophylaxis  -Lovenox 40 mg daily.  This medication may be necessary for up to 3 months depending on the functional status and clinical situation of the patient as the injury evolves.        Patient was seen for 28 minutes on unit/floor of which > 50% of time was spent on counseling and coordination of care regarding the above, including prognosis, risk reduction, benefits of treatment, and options for next stage of care including neuropathic pain, increase gabapentin to 600 mg 3 times a day, posttraumatic pain, decrease Robaxin to 500 mg 3 times a day, TBI, DC Keppra for posttraumatic seizure prophylactic after 7 days.      Wilfredo Chacon D.O.

## 2020-11-13 NOTE — THERAPY
Speech Language Pathology  Daily Treatment     Patient Name: Isaias Ortiz  Age:  80 y.o., Sex:  male  Medical Record #: [id number]  Today's Date: 11/13/2020     Precautions  Precautions: Fall Risk, Spinal / Back Precautions , Cervical Collar    Comments: C-collar on at all times except for shower/meals    Subjective    15 minutes missed tx due to mandatory COVID-19 meeting.      Objective       11/13/20 0845   Cognition   Functional Memory Activities Minimal (4)   Therapy Missed   Missed Therapy (Minutes) 15   Reason For Missed Therapy Non-Medical - Other (Please Comment)  (COVID MTG)   SLP Total Time Spent   SLP Individual Total Time Spent (Mins) 45   Treatment Charges   SLP Cognitive Skill Development First 15 Minutes 1   SLP Cognitive Skill Development Additional 15 Minutes 2       Assessment    Memory book introduced this day.  Pt verbalized understanding, and recalled daily events given min verbal cues.  RWAVS memory strategies introduced.  Pt verbalized understanding.  Pt reported intermittent word finding difficulties.  SLP recommended circumlocution method (describe target word) to aid in word finding.      Strengths: Supportive family, Willingly participates in therapeutic activities, Pleasant and cooperative, Motivated for self care and independence    Plan    Target recall, memory book, RWAVS, med management.     Speech Therapy Problems     Problem: Memory STGs     Dates: Start: 11/12/20       Goal: STG-Within one week, patient will     Dates: Start: 11/12/20       Description: 1) Individualized goal:  demonstrate use of memory strategies in order to recall daily events 80% of the time.   2) Interventions:  SLP Speech Language Treatment, SLP Self Care / ADL Training , SLP Cognitive Skill Development, and SLP Group Treatment                Problem: Problem Solving STGs     Dates: Start: 11/12/20       Goal: STG-Within one week, patient will solve complex problems     Dates: Start: 11/12/20       Description: 1)  Individualized goal:  related to Medication management with 90% accuracy given SPV  2) Interventions:  SLP Speech Language Treatment, SLP Self Care / ADL Training , SLP Cognitive Skill Development, and SLP Group Treatment                Problem: Speech/Swallowing LTGs     Dates: Start: 11/12/20       Goal: LTG-By discharge, patient will solve complex problem     Dates: Start: 11/12/20       Description: 1) Individualized goal:  related to IADL's in order to safely d/c home with mod I  2) Interventions:  SLP Speech Language Treatment, SLP Self Care / ADL Training , SLP Cognitive Skill Development, and SLP Group Treatment

## 2020-11-13 NOTE — CARE PLAN
Problem: Infection  Goal: Will remain free from infection  Outcome: PROGRESSING AS EXPECTED  Note: Patient remains free from s/s infection; afebrile.  Will continue to monitor.       Problem: Communication  Goal: The ability to communicate needs accurately and effectively will improve  Outcome: PROGRESSING AS EXPECTED  Note: Patient uses call light consistently and appropriately this shift.  Waits for assistance when needed and does not attempt self transfer.  Able to verbalize needs.  Will continue to monitor.

## 2020-11-13 NOTE — CARE PLAN
Problem: Communication  Goal: The ability to communicate needs accurately and effectively will improve  Outcome: PROGRESSING AS EXPECTED     Problem: Safety  Goal: Will remain free from injury  Outcome: PROGRESSING AS EXPECTED     Problem: Skin Integrity  Goal: Risk for impaired skin integrity will decrease  Outcome: PROGRESSING AS EXPECTED

## 2020-11-13 NOTE — THERAPY
Physical Therapy   Initial Evaluation     Patient Name: Isaias Ortiz  Age:  80 y.o., Sex:  male  Medical Record #: [id number]  Today's Date: 11/12/2020     Subjective    Pt lives in single level house in [place], [place]. Both pt and spouse are retired. He enjoys helping with household chores including laundry and cooking, golfing, fishing, helping with yard work and going on walks.      Objective       11/12/20 1331   Prior Living Situation   Prior Services None;Home-Independent   Housing / Facility 1 Story House   Steps Into Home 2   Steps In Home 0   Rail None   Elevator No   Bathroom Set up Walk In Shower   Equipment Owned 4-Wheel Walker   Lives with - Patient's Self Care Capacity Spouse   Comments Pt lives in single level house in [place], [place]. Both pt and spouse are retired. He enjoys helping with household chorese including laundry and cooking, golfing, fishing, helping with yard work and going on walks.    Prior Level of Functional Mobility   Bed Mobility Independent   Transfer Status Independent   Ambulation Independent   Distance Ambulation (Feet)   (community)   Assistive Devices Used None   Wheelchair Unable To Determine At This Time   Stairs Independent   Prior Functioning: Everyday Activities   Self Care Independent   Indoor Mobility (Ambulation) Independent   Stairs Independent   Functional Cognition Independent   Prior Device Use None of the given options   Vitals   O2 (LPM) 2   O2 Delivery Device Silicone Nasal Cannula   Pain   Intervention Declines   Cognition    Level of Consciousness Alert   Passive ROM Lower Body   Passive ROM Lower Body WDL   Active ROM Lower Body    Active ROM Lower Body  WDL   Strength Lower Body   Lower Body Strength  WDL   Sensation Lower Body   Lower Extremity Sensation   WDL   Comments light touch, deep pressure, temperature, proprioception B LE   Lower Body Muscle Tone   Lower Body Muscle Tone  WDL   Balance Assessment   Sitting Balance (Static) Normal   Sitting Balance (Dynamic)  Normal   Standing Balance (Static) Fair +   Standing Balance (Dynamic) Fair   Weight Shift Sitting Good   Weight Shift Standing Fair   Bed Mobility    Supine to Sit Minimal Assist   Sit to Supine Minimal Assist   Sit to Stand Contact Guard Assist   Scooting Supervised   Rolling Supervised   Neurological Concerns   Comments impaired standing balance   Coordination Lower Body    Coordination Lower Body  WDL   Roll Left and Right   Assistance Needed Supervision   Physical Assistance Level No physical assistance   CARE Score 4   Roll Left and Right Discharge Goal   Discharge Goal Independent   Sit to Lying   Assistance Needed Physical assistance   Physical Assistance Level 26%-50%   CARE Score 3   Sit to Lying Discharge Goal   Discharge Goal Independent   Lying to Sitting on Side of Bed   Assistance Needed Physical assistance   Physical Assistance Level 26%-50%   CARE Score 3   Lying to Sitting on Side of Bed Discharge Goal   Discharge Goal Independent   Sit to Stand   Assistance Needed Incidental touching   Physical Assistance Level No physical assistance   CARE Score 4   Sit to Stand Discharge Goal   Discharge Goal Independent   Chair/Bed-to-Chair Transfer   Assistance Needed Incidental touching   Physical Assistance Level No physical assistance   CARE Score 4   Chair/Bed-to-Chair Transfer Discharge Goal   Discharge Goal Independent   Car Transfer   Assistance Needed Incidental touching   Physical Assistance Level No physical assistance   CARE Score 4   Car Transfer Discharge Goal   Discharge Goal Independent   Walk 10 Feet   Assistance Needed Incidental touching   Physical Assistance Level No physical assistance   CARE Score 4   Walk 10 Feet Discharge Goal   Discharge Goal Independent   Walk 50 Feet with Two Turns   Assistance Needed Incidental touching   Physical Assistance Level No physical assistance   CARE Score 4   Walk 50 Feet with Two Turns Discharge Goal   Discharge Goal Independent   Walk 150 Feet    Assistance Needed Incidental touching   Physical Assistance Level No physical assistance   CARE Score 4   Walk 150 Feet Discharge Goal   Discharge Goal Independent   Walking 10 Feet on Uneven Surfaces   Assistance Needed Incidental touching   Physical Assistance Level No physical assistance   CARE Score 4   Walking 10 Feet on Uneven Surfaces Discharge Goal   Discharge Goal Independent   1 Step (Curb)   Assistance Needed Incidental touching   Physical Assistance Level No physical assistance   CARE Score 4   1 Step (Curb) Discharge Goal   Discharge Goal Independent   4 Steps   Assistance Needed Incidental touching;Adaptive equipment   Physical Assistance Level No physical assistance   CARE Score 4   4 Steps Discharge Goal   Discharge Goal Independent   12 Steps   Assistance Needed Incidental touching;Adaptive equipment   Physical Assistance Level No physical assistance   CARE Score 4   12 Steps Discharge Goal   Discharge Goal Independent   Picking Up Object   Reason if not Attempted Safety concerns   CARE Score 88   Picking Up Object Discharge Goal   Discharge Goal Independent   Wheel 50 Feet with Two Turns   Assistance Needed Supervision   Physical Assistance Level No physical assistance   CARE Score 4   Type of Wheelchair/Scooter Manual   Wheel 50 Feet with Two Turns Discharge Goal   Discharge Goal Independent   Wheel 150 Feet   Assistance Needed Supervision   Physical Assistance Level No physical assistance   CARE Score 4   Type of Wheelchair/Scooter Manual   Wheel 150 Feet Discharge Goal   Discharge Goal Independent   Gait Functional Level of Assist    Gait Level Of Assist Contact Guard Assist   Assistive Device None  (220)   Distance (Feet) 220   # of Times Distance was Traveled 2   Deviation Bradykinetic;Increased Base Of Support;Decreased Heel Strike;Decreased Toe Off   Wheelchair Functional Level of Assist   Wheelchair Assist Supervised   Distance Wheelchair (Feet or Distance) 170   Wheelchair Description  "Extra time  (B UE)   Stairs Functional Level of Assist   Level of Assist with Stairs Contact Guard Assist   # of Stairs Climbed 12  (12 x 1 6\" steps B HR step through pattern)   Stairs Description Oxygen;Hand rails;Extra time   Transfer Functional Level of Assist   Bed, Chair, Wheelchair Transfer Contact Guard Assist   Bed Chair Wheelchair Transfer Description   (SPT no AD)   Problem List    Problems Limited Knowledge of Post-Op Precautions;Decreased Activity Tolerance;Impaired Balance;Impaired Ambulation;Impaired Transfers;Impaired Bed Mobility   Precautions   Precautions Fall Risk;Spinal / Back Precautions ;Cervical Collar     Comments C-collar on at all times except for shower/meals   Current Discharge Plan   Current Discharge Plan Return to Prior Living Situation   Interdisciplinary Plan of Care Collaboration   IDT Collaboration with  Nursing;Family / Caregiver   Patient Position at End of Therapy In Bed;Call Light within Reach;Tray Table within Reach;Phone within Reach;Family / Friend in Room   Collaboration Comments re: CLOF   Benefit   Therapy Benefit Patient Would Benefit from Inpatient Rehabilitation Physical Therapy to Maximize Functional [place] with ADLs, IADLs and Mobility.   Strengths & Barriers   Strengths Willingly participates in therapeutic activities;Pleasant and cooperative;Supportive family;Motivated for self care and independence;Manages pain appropriately;Independent prior level of function;Good insight into deficits/needs;Good endurance;Good carryover of learning;Effective communication skills;Alert and oriented;Adequate strength   Barriers Impaired activity tolerance;Impaired balance;Home accessibility;Fatigue;Decreased endurance   PT Total Time Spent   PT Individual Total Time Spent (Mins) 60   PT Charge Group   PT Therapeutic Activities 1   PT Evaluation PT Evaluation Low       Assessment  Patient is 80 y.o. 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  Additional factors " "influencing patient status / progress (ie: cognitive factors, co-morbidities, social support, etc): Per H&P, past medical history includes hypertension, diabetes, hyperlipidemia, prostate cancer, COPD, RACQUEL on CPAP at night, who was admitted to [facility] on 11/5 after mechanical ground-level fall after tripping over something in the parking lot of a golf course.  He was found to have subdural hematoma and C6 burst fracture. Patient reportedly did have head strike during fall but with loss of consciousness.  He was initially seen at [facility] and transferred to [facility] [place] [place] for neurosurgical intervention. Pt presents with impaired standing balance, knowledge of precautions and endurance.  Pt would benefit from skilled PT services to address these concerns prior to d/c home with support of wife.    Plan  Recommend Physical Therapy  minutes per day 5-7 days per week for 7-10 days for the following treatments:  PT Gait Training, PT Therapeutic Exercises, PT Neuro Re-Ed/Balance, PT Therapeutic Activity, PT Manual Therapy and PT Evaluation.    Goals:  Long term and short term goals have been discussed with patient and they are in agreement.    Physical Therapy Problems     Problem: Mobility     Dates: Start: 11/12/20       Goal: STG-Within one week, patient will ambulate community distances     Dates: Start: 11/12/20       Description: 1) Individualized goal:  400ft x 1 no AD SBA  2) Interventions:  PT Gait Training, PT Therapeutic Exercises, PT Neuro Re-Ed/Balance, PT Therapeutic Activity, PT Manual Therapy, and PT Evaluation          Goal: STG-Within one week, patient will ambulate up/down a curb     Dates: Start: 11/12/20       Description: 1) Individualized goal:  2 x 1 6\" curb with no AD SBA to mimic home entrance   2) Interventions:  PT Gait Training, PT Therapeutic Exercises, PT Neuro Re-Ed/Balance, PT Therapeutic Activity, PT Manual Therapy, and PT Evaluation         " "   Goal: STG-Within one week, patient will ambulate up/down flight of stairs     Dates: Start: 11/12/20       Description: 1) Individualized goal:  12 x 1 6\" steps mod I  2) Interventions:  PT Gait Training, PT Therapeutic Exercises, PT Neuro Re-Ed/Balance, PT Therapeutic Activity, PT Manual Therapy, and PT Evaluation                  Problem: Mobility Transfers     Dates: Start: 11/12/20       Goal: STG-Within one week, patient will transfer bed to chair     Dates: Start: 11/12/20       Description: 1) Individualized goal:  mod I with no AD  2) Interventions:  PT Gait Training, PT Therapeutic Exercises, PT Neuro Re-Ed/Balance, PT Therapeutic Activity, PT Manual Therapy, and PT Evaluation                Problem: PT-Long Term Goals     Dates: Start: 11/12/20       Goal: LTG-By discharge, patient will ambulate     Dates: Start: 11/12/20       Description: 1) Individualized goal: 400ft x 1 mod I no AD  2) Interventions:  PT Gait Training, PT Therapeutic Exercises, PT Neuro Re-Ed/Balance, PT Therapeutic Activity, PT Manual Therapy, and PT Evaluation            Goal: LTG-By discharge, patient will perform home exercise program     Dates: Start: 11/12/20       Description: 1) Individualized goal:  for standing balance, mod I  2) Interventions:  PT Gait Training, PT Therapeutic Exercises, PT Neuro Re-Ed/Balance, PT Therapeutic Activity, PT Manual Therapy, and PT Evaluation            Goal: LTG-By discharge, patient will ambulate up/down flight of stairs     Dates: Start: 11/12/20       Description: 1) Individualized goal:  4 x 1 6\" steps no HR CGA  2) Interventions:  PT Gait Training, PT Therapeutic Exercises, PT Neuro Re-Ed/Balance, PT Therapeutic Activity, PT Manual Therapy, and PT Evaluation            Goal: LTG-By discharge, patient will transfer in/out of a car     Dates: Start: 11/12/20       Description: 1) Individualized goal:  mod I no AD  2) Interventions:  PT Gait Training, PT Therapeutic Exercises, PT Neuro " Re-Ed/Balance, PT Therapeutic Activity, PT Manual Therapy, and PT Evaluation

## 2020-11-14 LAB
GLUCOSE BLD-MCNC: 186 MG/DL (ref 65–99)
GLUCOSE BLD-MCNC: 188 MG/DL (ref 65–99)
GLUCOSE BLD-MCNC: 192 MG/DL (ref 65–99)
GLUCOSE BLD-MCNC: 209 MG/DL (ref 65–99)

## 2020-11-14 PROCEDURE — 700101 HCHG RX REV CODE 250: Performed by: PHYSICAL MEDICINE & REHABILITATION

## 2020-11-14 PROCEDURE — 700102 HCHG RX REV CODE 250 W/ 637 OVERRIDE(OP): Performed by: PHYSICAL MEDICINE & REHABILITATION

## 2020-11-14 PROCEDURE — 94660 CPAP INITIATION&MGMT: CPT

## 2020-11-14 PROCEDURE — 94760 N-INVAS EAR/PLS OXIMETRY 1: CPT

## 2020-11-14 PROCEDURE — 770010 HCHG ROOM/CARE - REHAB SEMI PRIVAT*

## 2020-11-14 PROCEDURE — 700111 HCHG RX REV CODE 636 W/ 250 OVERRIDE (IP): Performed by: PHYSICAL MEDICINE & REHABILITATION

## 2020-11-14 PROCEDURE — 82962 GLUCOSE BLOOD TEST: CPT | Mod: 91

## 2020-11-14 PROCEDURE — A9270 NON-COVERED ITEM OR SERVICE: HCPCS | Performed by: PHYSICAL MEDICINE & REHABILITATION

## 2020-11-14 RX ADMIN — FAMOTIDINE 20 MG: 20 TABLET, FILM COATED ORAL at 20:58

## 2020-11-14 RX ADMIN — METHOCARBAMOL 500 MG: 500 TABLET, FILM COATED ORAL at 20:58

## 2020-11-14 RX ADMIN — LOVASTATIN 40 MG: 20 TABLET ORAL at 08:25

## 2020-11-14 RX ADMIN — INSULIN HUMAN 2 UNITS: 100 INJECTION, SOLUTION PARENTERAL at 17:04

## 2020-11-14 RX ADMIN — CALCIUM CARBONATE (ANTACID) CHEW TAB 500 MG 500 MG: 500 CHEW TAB at 08:27

## 2020-11-14 RX ADMIN — LIDOCAINE 2 PATCH: 50 PATCH TOPICAL at 08:27

## 2020-11-14 RX ADMIN — METFORMIN HYDROCHLORIDE 1000 MG: 500 TABLET, EXTENDED RELEASE ORAL at 17:34

## 2020-11-14 RX ADMIN — METHOCARBAMOL 500 MG: 500 TABLET, FILM COATED ORAL at 15:35

## 2020-11-14 RX ADMIN — FAMOTIDINE 20 MG: 20 TABLET, FILM COATED ORAL at 08:26

## 2020-11-14 RX ADMIN — INSULIN HUMAN 3 UNITS: 100 INJECTION, SOLUTION PARENTERAL at 21:00

## 2020-11-14 RX ADMIN — MULTIPLE VITAMINS W/ MINERALS TAB 1 TABLET: TAB at 12:08

## 2020-11-14 RX ADMIN — UMECLIDINIUM BROMIDE AND VILANTEROL TRIFENATATE 1 PUFF: 62.5; 25 POWDER RESPIRATORY (INHALATION) at 08:33

## 2020-11-14 RX ADMIN — INSULIN HUMAN 2 UNITS: 100 INJECTION, SOLUTION PARENTERAL at 11:28

## 2020-11-14 RX ADMIN — DEXAMETHASONE 4 MG: 4 TABLET ORAL at 08:26

## 2020-11-14 RX ADMIN — INSULIN HUMAN 2 UNITS: 100 INJECTION, SOLUTION PARENTERAL at 07:20

## 2020-11-14 RX ADMIN — DEXAMETHASONE 4 MG: 4 TABLET ORAL at 20:57

## 2020-11-14 RX ADMIN — ENOXAPARIN SODIUM 40 MG: 40 INJECTION SUBCUTANEOUS at 17:34

## 2020-11-14 RX ADMIN — BISACODYL 10 MG: 10 SUPPOSITORY RECTAL at 20:08

## 2020-11-14 RX ADMIN — DOCUSATE SODIUM 200 MG: 100 CAPSULE ORAL at 20:57

## 2020-11-14 RX ADMIN — SENNOSIDES 17.2 MG: 8.6 TABLET, FILM COATED ORAL at 12:08

## 2020-11-14 RX ADMIN — GABAPENTIN 600 MG: 300 CAPSULE ORAL at 15:34

## 2020-11-14 RX ADMIN — THERA TABS 1 TABLET: TAB at 08:26

## 2020-11-14 RX ADMIN — FLUTICASONE PROPIONATE 88 MCG: 44 AEROSOL, METERED RESPIRATORY (INHALATION) at 08:33

## 2020-11-14 RX ADMIN — METHOCARBAMOL 500 MG: 500 TABLET, FILM COATED ORAL at 08:26

## 2020-11-14 RX ADMIN — ACETAMINOPHEN 1000 MG: 500 TABLET, FILM COATED ORAL at 08:26

## 2020-11-14 RX ADMIN — DOCUSATE SODIUM 200 MG: 100 CAPSULE ORAL at 08:26

## 2020-11-14 RX ADMIN — GABAPENTIN 600 MG: 300 CAPSULE ORAL at 08:25

## 2020-11-14 RX ADMIN — FLUTICASONE PROPIONATE 88 MCG: 44 AEROSOL, METERED RESPIRATORY (INHALATION) at 20:08

## 2020-11-14 RX ADMIN — SITAGLIPTIN 100 MG: 50 TABLET, FILM COATED ORAL at 08:25

## 2020-11-14 RX ADMIN — ACETAMINOPHEN 1000 MG: 500 TABLET, FILM COATED ORAL at 20:57

## 2020-11-14 RX ADMIN — GABAPENTIN 600 MG: 300 CAPSULE ORAL at 20:57

## 2020-11-14 ASSESSMENT — PAIN DESCRIPTION - PAIN TYPE
TYPE: ACUTE PAIN
TYPE: ACUTE PAIN

## 2020-11-14 NOTE — FLOWSHEET NOTE
11/14/20 1516   Events/Summary/Plan   Events/Summary/Plan SpO2 check,    Vital Signs   Pulse 74   Respiration 18   Pulse Oximetry 94 %   $ Pulse Oximetry (Spot Check) Yes   Oxygen   O2 Delivery Device None - Room Air

## 2020-11-14 NOTE — FLOWSHEET NOTE
11/13/20 1657   Events/Summary/Plan   Events/Summary/Plan Room air trial, SpO2 check,  DC Oxygen   Vital Signs   Pulse 72   Respiration 16   Pulse Oximetry 96 %   $ Pulse Oximetry (Spot Check) Yes   Oxygen   O2 Delivery Device None - Room Air

## 2020-11-14 NOTE — THERAPY
Physical Therapy   Daily Treatment     Patient Name: Isaias Ortiz  Age:  80 y.o., Sex:  male  Medical Record #: [id number]  Today's Date: 11/13/2020     Precautions  Precautions: Fall Risk, Cervical Collar  , Spinal / Back Precautions   Comments: C-collar on all times execpt fot meals/showers    Subjective    Patient agreeable to PT.     Objective       11/13/20 1431   Vitals   O2 (LPM) 0   O2 Delivery Device None - Room Air   Vitals Comments CPAP in room at bedside   Gait Functional Level of Assist    Gait Level Of Assist Contact Guard Assist   Assistive Device Front Wheel Walker  (for BUE support and occasional balance; CGA at gait belt)   Distance (Feet) 375   # of Times Distance was Traveled 1   Deviation Ataxic;Increased Base Of Support;Bradykinetic;Decreased Heel Strike  (no increased pain noted)   Stairs Functional Level of Assist   Level of Assist with Stairs Contact Guard Assist   # of Stairs Climbed 12   Stairs Description Hand rails;Extra time;Artifical limb(s);Requires incidental assist;Supervision for safety;Verbal cueing  (pt not limited by fatigue; uses step-to pattern descending)   Transfer Functional Level of Assist   Toilet Transfers Contact Guard Assist  (CGA to toilet, SBA from toilet)   Toilet Transfer Description Grab bar;Adaptive equipment;Increased time;Set-up of equipment;Supervision for safety;Verbal cues for spinal precautions  (and gait belt)   Bed Mobility    Sit to Stand Contact Guard Assist   Scooting Supervised   PT Total Time Spent   PT Individual Total Time Spent (Mins) 30   PT Charge Group   PT Gait Training 1   PT Therapeutic Activities 1       Assessment    Patient has improving endurance and activity tolerance; no increase in pain with ambulation today; cueing and CGA for safety; great motivation though; mild ataxia of BLE.    Strengths: Willingly participates in therapeutic activities, Pleasant and cooperative, Supportive family, Motivated for self care and independence, Manages  "pain appropriately, Independent prior level of function, Good insight into deficits/needs, Good endurance, Good carryover of learning, Effective communication skills, Alert and oriented, Adequate strength  Barriers: Impaired activity tolerance, Impaired balance, Home accessibility, Fatigue, Decreased endurance    Plan    General strength and endurance, gait training no AD, core and hip strengthening, cont edu on spinal precautions, transfer training     Physical Therapy Problems     Problem: Mobility     Dates: Start: 11/12/20       Goal: STG-Within one week, patient will ambulate community distances     Dates: Start: 11/12/20       Description: 1) Individualized goal:  400ft x 1 no AD SBA  2) Interventions:  PT Gait Training, PT Therapeutic Exercises, PT Neuro Re-Ed/Balance, PT Therapeutic Activity, PT Manual Therapy, and PT Evaluation          Goal: STG-Within one week, patient will ambulate up/down a curb     Dates: Start: 11/12/20       Description: 1) Individualized goal:  2 x 1 6\" curb with no AD SBA to mimic home entrance   2) Interventions:  PT Gait Training, PT Therapeutic Exercises, PT Neuro Re-Ed/Balance, PT Therapeutic Activity, PT Manual Therapy, and PT Evaluation            Goal: STG-Within one week, patient will ambulate up/down flight of stairs     Dates: Start: 11/12/20       Description: 1) Individualized goal:  12 x 1 6\" steps mod I  2) Interventions:  PT Gait Training, PT Therapeutic Exercises, PT Neuro Re-Ed/Balance, PT Therapeutic Activity, PT Manual Therapy, and PT Evaluation                  Problem: Mobility Transfers     Dates: Start: 11/12/20       Goal: STG-Within one week, patient will transfer bed to chair     Dates: Start: 11/12/20       Description: 1) Individualized goal:  mod I with no AD  2) Interventions:  PT Gait Training, PT Therapeutic Exercises, PT Neuro Re-Ed/Balance, PT Therapeutic Activity, PT Manual Therapy, and PT Evaluation                Problem: PT-Long Term Goals     " "Dates: Start: 11/12/20       Goal: LTG-By discharge, patient will ambulate     Dates: Start: 11/12/20       Description: 1) Individualized goal: 400ft x 1 mod I no AD  2) Interventions:  PT Gait Training, PT Therapeutic Exercises, PT Neuro Re-Ed/Balance, PT Therapeutic Activity, PT Manual Therapy, and PT Evaluation            Goal: LTG-By discharge, patient will perform home exercise program     Dates: Start: 11/12/20       Description: 1) Individualized goal:  for standing balance, mod I  2) Interventions:  PT Gait Training, PT Therapeutic Exercises, PT Neuro Re-Ed/Balance, PT Therapeutic Activity, PT Manual Therapy, and PT Evaluation            Goal: LTG-By discharge, patient will ambulate up/down flight of stairs     Dates: Start: 11/12/20       Description: 1) Individualized goal:  4 x 1 6\" steps no HR CGA  2) Interventions:  PT Gait Training, PT Therapeutic Exercises, PT Neuro Re-Ed/Balance, PT Therapeutic Activity, PT Manual Therapy, and PT Evaluation            Goal: LTG-By discharge, patient will transfer in/out of a car     Dates: Start: 11/12/20       Description: 1) Individualized goal:  mod I no AD  2) Interventions:  PT Gait Training, PT Therapeutic Exercises, PT Neuro Re-Ed/Balance, PT Therapeutic Activity, PT Manual Therapy, and PT Evaluation                      "

## 2020-11-14 NOTE — THERAPY
"Physical Therapy   Daily Treatment     Patient Name: Isaias Ortiz  Age:  80 y.o., Sex:  male  Medical Record #: [id number]  Today's Date: 11/13/2020     Precautions  Precautions: Fall Risk, Cervical Collar  , Spinal / Back Precautions   Comments: C-collar on all times execpt fot meals/showers    Subjective    \"ill probably only be here until Sunday\"     Objective       11/13/20 1601   Transfer Functional Level of Assist   Bed, Chair, Wheelchair Transfer Contact Guard Assist   Bed Chair Wheelchair Transfer Description Other (comment)  (SPT no AD)   Sitting Lower Body Exercises   Sitting Lower Body Exercises Yes   Nustep Resistance Level 3;Time (See Comments)  (15' B LE only for strength and endurance)   Bed Mobility    Sit to Stand Contact Guard Assist   Interdisciplinary Plan of Care Collaboration   Patient Position at End of Therapy Seated;Self Releasing Lap Belt Applied;Call Light within Reach;Phone within Reach;Tray Table within Reach   PT Total Time Spent   PT Individual Total Time Spent (Mins) 30   PT Charge Group   PT Therapeutic Exercise 1   PT Therapeutic Activities 1   Supervising Physical Therapist Shaniqua Ray     Reviewed spinal precautions, edu pt on goals and expectations for rehab stay    Assessment    Pt with good tolerance to activity, completed recumbent stepper with B LE only no rest breaks required.   Strengths: Willingly participates in therapeutic activities, Pleasant and cooperative, Supportive family, Motivated for self care and independence, Manages pain appropriately, Independent prior level of function, Good insight into deficits/needs, Good endurance, Good carryover of learning, Effective communication skills, Alert and oriented, Adequate strength  Barriers: Impaired activity tolerance, Impaired balance, Home accessibility, Fatigue, Decreased endurance    Plan    General strength and endurance, gait training no AD, core and hip strengthening, cont edu on spinal precautions, transfer training " "    Physical Therapy Problems     Problem: Mobility     Dates: Start: 11/12/20       Goal: STG-Within one week, patient will ambulate community distances     Dates: Start: 11/12/20       Description: 1) Individualized goal:  400ft x 1 no AD SBA  2) Interventions:  PT Gait Training, PT Therapeutic Exercises, PT Neuro Re-Ed/Balance, PT Therapeutic Activity, PT Manual Therapy, and PT Evaluation          Goal: STG-Within one week, patient will ambulate up/down a curb     Dates: Start: 11/12/20       Description: 1) Individualized goal:  2 x 1 6\" curb with no AD SBA to mimic home entrance   2) Interventions:  PT Gait Training, PT Therapeutic Exercises, PT Neuro Re-Ed/Balance, PT Therapeutic Activity, PT Manual Therapy, and PT Evaluation            Goal: STG-Within one week, patient will ambulate up/down flight of stairs     Dates: Start: 11/12/20       Description: 1) Individualized goal:  12 x 1 6\" steps mod I  2) Interventions:  PT Gait Training, PT Therapeutic Exercises, PT Neuro Re-Ed/Balance, PT Therapeutic Activity, PT Manual Therapy, and PT Evaluation                  Problem: Mobility Transfers     Dates: Start: 11/12/20       Goal: STG-Within one week, patient will transfer bed to chair     Dates: Start: 11/12/20       Description: 1) Individualized goal:  mod I with no AD  2) Interventions:  PT Gait Training, PT Therapeutic Exercises, PT Neuro Re-Ed/Balance, PT Therapeutic Activity, PT Manual Therapy, and PT Evaluation                Problem: PT-Long Term Goals     Dates: Start: 11/12/20       Goal: LTG-By discharge, patient will ambulate     Dates: Start: 11/12/20       Description: 1) Individualized goal: 400ft x 1 mod I no AD  2) Interventions:  PT Gait Training, PT Therapeutic Exercises, PT Neuro Re-Ed/Balance, PT Therapeutic Activity, PT Manual Therapy, and PT Evaluation            Goal: LTG-By discharge, patient will perform home exercise program     Dates: Start: 11/12/20       Description: 1) " "Individualized goal:  for standing balance, mod I  2) Interventions:  PT Gait Training, PT Therapeutic Exercises, PT Neuro Re-Ed/Balance, PT Therapeutic Activity, PT Manual Therapy, and PT Evaluation            Goal: LTG-By discharge, patient will ambulate up/down flight of stairs     Dates: Start: 11/12/20       Description: 1) Individualized goal:  4 x 1 6\" steps no HR CGA  2) Interventions:  PT Gait Training, PT Therapeutic Exercises, PT Neuro Re-Ed/Balance, PT Therapeutic Activity, PT Manual Therapy, and PT Evaluation            Goal: LTG-By discharge, patient will transfer in/out of a car     Dates: Start: 11/12/20       Description: 1) Individualized goal:  mod I no AD  2) Interventions:  PT Gait Training, PT Therapeutic Exercises, PT Neuro Re-Ed/Balance, PT Therapeutic Activity, PT Manual Therapy, and PT Evaluation                      "

## 2020-11-14 NOTE — THERAPY
Occupational Therapy  Daily Treatment     Patient Name: Isaias Ortiz  Age:  80 y.o., Sex:  male  Medical Record #: [id number]  Today's Date: 11/13/2020     Precautions  Precautions: (P) Fall Risk, Cervical Collar  , Spinal / Back Precautions   Comments: (P) C-collar on all times execpt fot meals/showers         Subjective    Pt up in w/c and ready for OT session.  Eager to take a shower today.     Objective       11/13/20 1031   Precautions   Precautions Fall Risk;Cervical Collar  ;Spinal / Back Precautions    Comments C-collar on all times execpt fot meals/showers   Pain 0 - 10 Group   Location Neck   Location Orientation Posterior   Pain Rating Scale (NPRS) 2   Description Deep;Sore   Comfort Goal Comfort with Movement   Cognition    Level of Consciousness Alert   ABS (Agitated Behavior Scale)   Agitated Behavior Scale Performed No   Sleep/Wake Cycle   Sleep & Rest Awake   Functional Level of Assist   Grooming Supervision   Grooming Description Seated in wheelchair at sink;Supervision for safety   Bathing Contact Guard Assist   Bathing Description Adaptive equipment;Grab bar;Hand held shower;Tub bench;Assit with back;Initial preparation for task;Supervision for safety   Upper Body Dressing Stand by Assist   Lower Body Dressing Contact Guard Assist   Bed, Chair, Wheelchair Transfer Contact Guard Assist   Bed Chair Wheelchair Transfer Description Set-up of equipment;Supervision for safety   Tub / Shower Transfers Contact Guard Assist   Tub Shower Transfer Description Grab bar;Shower bench;Set-up of equipment;Supervision for safety   Interdisciplinary Plan of Care Collaboration   IDT Collaboration with  Nursing   Patient Position at End of Therapy In Bed;Call Light within Reach;Tray Table within Reach;Phone within Reach   Collaboration Comments RN came to check BS before lunchtime   Strengths & Barriers   Strengths Able to follow instructions;Alert and oriented;Effective communication skills;Good carryover of  learning;Good insight into deficits/needs;Independent prior level of function;Making steady progress towards goals;Manages pain appropriately;Motivated for self care and independence;Pleasant and cooperative;Supportive family;Willingly participates in therapeutic activities   Barriers Generalized weakness;Impaired activity tolerance;Impaired balance   OT Total Time Spent   OT Individual Total Time Spent (Mins) 60   OT Charge Group   OT Self Care / ADL 4       Assessment    Pt performed ADL's including showering, dressing, grooming, and ADL transfers.  Pt able to complete most of seated shower on his own, just needed help with washing his back and couldn't reach his head to apply shampoo or rinse.  Pt completed transfers into and out of the shower area with CGA for balance and safety.  Pt didn't need any AE to don sweatpants seated, but needed A with donning his socks.     Strengths: (P) Able to follow instructions, Alert and oriented, Effective communication skills, Good carryover of learning, Good insight into deficits/needs, Independent prior level of function, Making steady progress towards goals, Manages pain appropriately, Motivated for self care and independence, Pleasant and cooperative, Supportive family, Willingly participates in therapeutic activities  Barriers: (P) Generalized weakness, Impaired activity tolerance, Impaired balance    Plan    Continue with OT POC focusing on ADLs, ADL transfers, activity tolerance, and caregiver training.    Occupational Therapy Goals     Problem: Bathing     Dates: Start: 11/12/20       Goal: STG-Within one week, patient will bathe     Dates: Start: 11/12/20       Description: 1) Individualized Goal:  SBA   2) Interventions:  OT Orthotics Training, OT Self Care/ADL, OT Manual Ther Technique, OT Neuro Re-Ed/Balance, OT Sensory Int Techniques, OT Therapeutic Activity, OT Evaluation, and OT Therapeutic Exercise                  Problem: Dressing     Dates: Start: 11/12/20        Goal: STG-Within one week, patient will dress LB     Dates: Start: 11/12/20       Description: 1) Individualized Goal:  SBA   2) Interventions:  OT Orthotics Training, OT Self Care/ADL, OT Manual Ther Technique, OT Neuro Re-Ed/Balance, OT Sensory Int Techniques, OT Therapeutic Activity, OT Evaluation, and OT Therapeutic Exercise                  Problem: IADL's     Dates: Start: 11/12/20       Goal: STG-Within one week, patient will prepare a meal     Dates: Start: 11/12/20       Description: 1) Individualized Goal:  SBA while standing     2) Interventions:  OT Orthotics Training, OT Self Care/ADL, OT Manual Ther Technique, OT Neuro Re-Ed/Balance, OT Sensory Int Techniques, OT Therapeutic Activity, OT Evaluation, and OT Therapeutic Exercise                  Problem: OT Long Term Goals     Dates: Start: 11/12/20       Goal: LTG-By discharge, patient will complete basic self care tasks     Dates: Start: 11/12/20       Description: 1) Individualized Goal:  Mod I   2) Interventions:  OT Orthotics Training, OT Self Care/ADL, OT Manual Ther Technique, OT Neuro Re-Ed/Balance, OT Sensory Int Techniques, OT Therapeutic Activity, OT Evaluation, and OT Therapeutic Exercise            Goal: LTG-By discharge, patient will perform bathroom transfers     Dates: Start: 11/12/20       Description: 1) Individualized Goal:  Mod I    2) Interventions:  OT Orthotics Training, OT Self Care/ADL, OT Manual Ther Technique, OT Neuro Re-Ed/Balance, OT Sensory Int Techniques, OT Therapeutic Activity, OT Evaluation, and OT Therapeutic Exercise

## 2020-11-15 LAB
GLUCOSE BLD-MCNC: 138 MG/DL (ref 65–99)
GLUCOSE BLD-MCNC: 156 MG/DL (ref 65–99)
GLUCOSE BLD-MCNC: 173 MG/DL (ref 65–99)
GLUCOSE BLD-MCNC: 204 MG/DL (ref 65–99)

## 2020-11-15 PROCEDURE — 97140 MANUAL THERAPY 1/> REGIONS: CPT

## 2020-11-15 PROCEDURE — 700102 HCHG RX REV CODE 250 W/ 637 OVERRIDE(OP): Performed by: PHYSICAL MEDICINE & REHABILITATION

## 2020-11-15 PROCEDURE — 700111 HCHG RX REV CODE 636 W/ 250 OVERRIDE (IP): Performed by: PHYSICAL MEDICINE & REHABILITATION

## 2020-11-15 PROCEDURE — 97535 SELF CARE MNGMENT TRAINING: CPT

## 2020-11-15 PROCEDURE — 97129 THER IVNTJ 1ST 15 MIN: CPT

## 2020-11-15 PROCEDURE — 97116 GAIT TRAINING THERAPY: CPT

## 2020-11-15 PROCEDURE — 700101 HCHG RX REV CODE 250: Performed by: PHYSICAL MEDICINE & REHABILITATION

## 2020-11-15 PROCEDURE — 97130 THER IVNTJ EA ADDL 15 MIN: CPT

## 2020-11-15 PROCEDURE — 94760 N-INVAS EAR/PLS OXIMETRY 1: CPT

## 2020-11-15 PROCEDURE — 82962 GLUCOSE BLOOD TEST: CPT

## 2020-11-15 PROCEDURE — A9270 NON-COVERED ITEM OR SERVICE: HCPCS | Performed by: PHYSICAL MEDICINE & REHABILITATION

## 2020-11-15 PROCEDURE — 97110 THERAPEUTIC EXERCISES: CPT

## 2020-11-15 PROCEDURE — 770010 HCHG ROOM/CARE - REHAB SEMI PRIVAT*

## 2020-11-15 RX ADMIN — LOVASTATIN 40 MG: 20 TABLET ORAL at 09:06

## 2020-11-15 RX ADMIN — METHOCARBAMOL 500 MG: 500 TABLET, FILM COATED ORAL at 20:30

## 2020-11-15 RX ADMIN — CALCIUM CARBONATE (ANTACID) CHEW TAB 500 MG 500 MG: 500 CHEW TAB at 09:06

## 2020-11-15 RX ADMIN — LIDOCAINE 2 PATCH: 50 PATCH TOPICAL at 09:07

## 2020-11-15 RX ADMIN — DOCUSATE SODIUM 200 MG: 100 CAPSULE ORAL at 09:06

## 2020-11-15 RX ADMIN — ACETAMINOPHEN 1000 MG: 500 TABLET, FILM COATED ORAL at 09:51

## 2020-11-15 RX ADMIN — THERA TABS 1 TABLET: TAB at 09:08

## 2020-11-15 RX ADMIN — METHOCARBAMOL 500 MG: 500 TABLET, FILM COATED ORAL at 14:17

## 2020-11-15 RX ADMIN — DEXAMETHASONE 4 MG: 4 TABLET ORAL at 09:07

## 2020-11-15 RX ADMIN — GABAPENTIN 600 MG: 300 CAPSULE ORAL at 14:17

## 2020-11-15 RX ADMIN — UMECLIDINIUM BROMIDE AND VILANTEROL TRIFENATATE 1 PUFF: 62.5; 25 POWDER RESPIRATORY (INHALATION) at 09:09

## 2020-11-15 RX ADMIN — GABAPENTIN 600 MG: 300 CAPSULE ORAL at 09:06

## 2020-11-15 RX ADMIN — SITAGLIPTIN 100 MG: 50 TABLET, FILM COATED ORAL at 09:06

## 2020-11-15 RX ADMIN — GABAPENTIN 600 MG: 300 CAPSULE ORAL at 20:30

## 2020-11-15 RX ADMIN — INSULIN HUMAN 2 UNITS: 100 INJECTION, SOLUTION PARENTERAL at 17:32

## 2020-11-15 RX ADMIN — INSULIN HUMAN 3 UNITS: 100 INJECTION, SOLUTION PARENTERAL at 20:35

## 2020-11-15 RX ADMIN — ACETAMINOPHEN 1000 MG: 500 TABLET, FILM COATED ORAL at 20:31

## 2020-11-15 RX ADMIN — FLUTICASONE PROPIONATE 88 MCG: 44 AEROSOL, METERED RESPIRATORY (INHALATION) at 09:09

## 2020-11-15 RX ADMIN — FAMOTIDINE 20 MG: 20 TABLET, FILM COATED ORAL at 20:30

## 2020-11-15 RX ADMIN — FAMOTIDINE 20 MG: 20 TABLET, FILM COATED ORAL at 09:07

## 2020-11-15 RX ADMIN — SENNOSIDES 17.2 MG: 8.6 TABLET, FILM COATED ORAL at 11:51

## 2020-11-15 RX ADMIN — INSULIN HUMAN 2 UNITS: 100 INJECTION, SOLUTION PARENTERAL at 11:35

## 2020-11-15 RX ADMIN — METFORMIN HYDROCHLORIDE 1000 MG: 500 TABLET, EXTENDED RELEASE ORAL at 17:31

## 2020-11-15 RX ADMIN — METHOCARBAMOL 500 MG: 500 TABLET, FILM COATED ORAL at 09:07

## 2020-11-15 RX ADMIN — DEXAMETHASONE 4 MG: 4 TABLET ORAL at 20:30

## 2020-11-15 RX ADMIN — MULTIPLE VITAMINS W/ MINERALS TAB 1 TABLET: TAB at 11:51

## 2020-11-15 RX ADMIN — ENOXAPARIN SODIUM 40 MG: 40 INJECTION SUBCUTANEOUS at 18:20

## 2020-11-15 RX ADMIN — FLUTICASONE PROPIONATE 88 MCG: 44 AEROSOL, METERED RESPIRATORY (INHALATION) at 20:30

## 2020-11-15 ASSESSMENT — GAIT ASSESSMENTS
DEVIATION: BRADYKINETIC;INCREASED BASE OF SUPPORT
DISTANCE (FEET): 50
GAIT LEVEL OF ASSIST: MODIFIED INDEPENDENT

## 2020-11-15 ASSESSMENT — PATIENT HEALTH QUESTIONNAIRE - PHQ9
2. FEELING DOWN, DEPRESSED, IRRITABLE, OR HOPELESS: NOT AT ALL
SUM OF ALL RESPONSES TO PHQ9 QUESTIONS 1 AND 2: 0
1. LITTLE INTEREST OR PLEASURE IN DOING THINGS: NOT AT ALL

## 2020-11-15 ASSESSMENT — PAIN DESCRIPTION - PAIN TYPE
TYPE: ACUTE PAIN
TYPE: ACUTE PAIN

## 2020-11-15 ASSESSMENT — ACTIVITIES OF DAILY LIVING (ADL)
TUB_SHOWER_TRANSFER_DESCRIPTION: GRAB BAR;SHOWER BENCH;INCREASED TIME;SUPERVISION FOR SAFETY
TOILET_TRANSFER_DESCRIPTION: GRAB BAR

## 2020-11-15 NOTE — FLOWSHEET NOTE
11/14/20 2610   Events/Summary/Plan   Events/Summary/Plan CPAP: Refit mask and adjust headgear, replace tubing to include whisper valve. Set device to auto CPAP 4-12 cmH20   Non-Invasive Ventilation [facility]   $ System Evaluation Yes   Settings (If Known) Auto CPAP 4-12   FiO2 or LPM Room air

## 2020-11-15 NOTE — THERAPY
Physical Therapy   Daily Treatment     Patient Name: Isaias Ortiz  Age:  80 y.o., Sex:  male  Medical Record #: [id number]  Today's Date: 11/15/2020     Precautions  Precautions: (P) Fall Risk, Cervical Collar  , Spinal / Back Precautions   Comments: (P) C-collar on at all times except for shower/meals    Subjective    Pt was sitting in w/c upon arrival and agreeable to tx     Objective       11/15/20 1001   Precautions   Precautions Fall Risk;Cervical Collar  ;Spinal / Back Precautions    Comments C-collar on at all times except for shower/meals   Gait Functional Level of Assist    Gait Level Of Assist Modified Independent   Assistive Device None   Distance (Feet) 50   # of Times Distance was Traveled 4   Deviation Bradykinetic;Increased Base Of Support  (in room on level terrain)   Transfer Functional Level of Assist   Bed, Chair, Wheelchair Transfer Modified Independent   Bed Chair Wheelchair Transfer Description   (SPT no AD)   Toilet Transfers Modified Independent   Toilet Transfer Description Grab bar  (elevated commode)   Bed Mobility    Supine to Sit Modified Independent   Sit to Supine Modified Independent   Sit to Stand Modified Independent   Scooting Modified Independent   Rolling Modified Independent   Interdisciplinary Plan of Care Collaboration   IDT Collaboration with  Occupational Therapist;Physician;Nursing   Patient Position at End of Therapy Seated;Call Light within Reach;Tray Table within Reach;Phone within Reach   Collaboration Comments re: pt mod I in room no AD   PT Total Time Spent   PT Individual Total Time Spent (Mins) 60   PT Charge Group   PT Gait Training 1   PT Therapeutic Exercise 1   PT Manual Therapy 2     R/L LE 3x 15 each exercise in supine:  serratus punch  ER  Unilateral UE flexion    Manual therapy 30 minutes including :  Grade 3 mob for posterior/ inferior glide at humerus   STM to subscap mm R/L, R/L supraspinatus mm, R/L lat mm  Education with 60sec x 3 on PROM for R/L ER,  "unilateral flexion, abduction    Additional gait traininft x 2 outdoors on uneven surface, ramps, gravel, grass, thresholds all with SBA and no AD    Provide MHP to B upper trap mm at end of session    Assessment    Pt limited by PROM for R/L shoulder ER, abduction and flexion; within session improvements and pt functional able to reach behind head and put on mask at end of session. Pt demonstrated good safety awareness and insight and is cleared for mod I in the room with no AD.  Strengths: Willingly participates in therapeutic activities, Pleasant and cooperative, Supportive family, Motivated for self care and independence, Manages pain appropriately, Independent prior level of function, Good insight into deficits/needs, Good endurance, Good carryover of learning, Effective communication skills, Alert and oriented, Adequate strength  Barriers: Impaired activity tolerance, Impaired balance, Home accessibility, Fatigue, Decreased endurance    Plan    Standing balance, AMB on uneven surface, R/L UE ROM, endurance in AMB, obstacle avoidance, stair training, STS training from decreased height surfaces, progress through passport     Physical Therapy Problems     Problem: Mobility     Dates: Start: 20       Goal: STG-Within one week, patient will ambulate community distances     Dates: Start: 20       Description: 1) Individualized goal:  400ft x 1 no AD SBA  2) Interventions:  PT Gait Training, PT Therapeutic Exercises, PT Neuro Re-Ed/Balance, PT Therapeutic Activity, PT Manual Therapy, and PT Evaluation          Goal: STG-Within one week, patient will ambulate up/down a curb     Dates: Start: 20       Description: 1) Individualized goal:  2 x 1 6\" curb with no AD SBA to mimic home entrance   2) Interventions:  PT Gait Training, PT Therapeutic Exercises, PT Neuro Re-Ed/Balance, PT Therapeutic Activity, PT Manual Therapy, and PT Evaluation            Goal: STG-Within one week, patient will " "ambulate up/down flight of stairs     Dates: Start: 11/12/20       Description: 1) Individualized goal:  12 x 1 6\" steps mod I  2) Interventions:  PT Gait Training, PT Therapeutic Exercises, PT Neuro Re-Ed/Balance, PT Therapeutic Activity, PT Manual Therapy, and PT Evaluation                  Problem: Mobility Transfers     Dates: Start: 11/12/20       Goal: STG-Within one week, patient will transfer bed to chair     Dates: Start: 11/12/20       Description: 1) Individualized goal:  mod I with no AD  2) Interventions:  PT Gait Training, PT Therapeutic Exercises, PT Neuro Re-Ed/Balance, PT Therapeutic Activity, PT Manual Therapy, and PT Evaluation                Problem: PT-Long Term Goals     Dates: Start: 11/12/20       Goal: LTG-By discharge, patient will ambulate     Dates: Start: 11/12/20       Description: 1) Individualized goal: 400ft x 1 mod I no AD  2) Interventions:  PT Gait Training, PT Therapeutic Exercises, PT Neuro Re-Ed/Balance, PT Therapeutic Activity, PT Manual Therapy, and PT Evaluation            Goal: LTG-By discharge, patient will perform home exercise program     Dates: Start: 11/12/20       Description: 1) Individualized goal:  for standing balance, mod I  2) Interventions:  PT Gait Training, PT Therapeutic Exercises, PT Neuro Re-Ed/Balance, PT Therapeutic Activity, PT Manual Therapy, and PT Evaluation            Goal: LTG-By discharge, patient will ambulate up/down flight of stairs     Dates: Start: 11/12/20       Description: 1) Individualized goal:  4 x 1 6\" steps no HR CGA  2) Interventions:  PT Gait Training, PT Therapeutic Exercises, PT Neuro Re-Ed/Balance, PT Therapeutic Activity, PT Manual Therapy, and PT Evaluation            Goal: LTG-By discharge, patient will transfer in/out of a car     Dates: Start: 11/12/20       Description: 1) Individualized goal:  mod I no AD  2) Interventions:  PT Gait Training, PT Therapeutic Exercises, PT Neuro Re-Ed/Balance, PT Therapeutic Activity, PT " Manual Therapy, and PT Evaluation

## 2020-11-15 NOTE — THERAPY
"Occupational Therapy  Daily Treatment     Patient Name: Isaias Ortiz  Age:  80 y.o., Sex:  male  Medical Record #: [id number]  Today's Date: 11/15/2020     Precautions  Precautions: Fall Risk, Cervical Collar  , Spinal / Back Precautions   Comments: C-collar on at all times except for shower/meals    Subjective    \"Oh good! I'm so glad I have therapy today. I was bored yesterday.\"    Patient denied having any neck pain this session, \"I've been eileen!\"    Objective     11/15/20 0701   Precautions   Precautions Fall Risk;Cervical Collar  ;Spinal / Back Precautions    Comments C-collar on at all times except for shower/meals   Vitals   O2 Delivery Device None - Room Air   Sleep/Wake Cycle   Sleep & Rest Awake   Functional Level of Assist   Bathing Contact Guard Assist  (while incorporating sitting and standing )   Upper Body Dressing Minimal Assist  (for shirt over head, Supervision for donning C-collar)   Lower Body Dressing Contact Guard Assist   Lower Body Dressing Description Grab bar;Increased time;Initial preparation for task;Set-up of equipment;Supervision for safety   Tub / Shower Transfers Stand by Assist   Tub Shower Transfer Description Grab bar;Shower bench;Increased time;Supervision for safety   Sitting Upper Body Exercises   Tricep Press 3 sets of 10;Weight (See Comments for lbs)  (2 sets x 10 and 1 set x 8, 30# (Zayra, forward))   Interdisciplinary Plan of Care Collaboration   Patient Position at End of Therapy Seated;Call Light within Reach   OT Total Time Spent   OT Individual Total Time Spent (Mins) 60   OT Charge Group   OT Self Care / ADL 2   OT Therapeutic Exercise  2     Fluidobike in standing x 8 mins (level 3 resistance)    Assessment    Patient tolerated OT session well with focus on progression with ADLs, UB strengthening, standing tolerance and endurance. Patient very eager and motivated to participate in therapy. Requires min assist for UB dressing (shirt over head pursuit) due to decreased " shoulder strength. Undersigned therapist had patient trial shirt over head pursuit first (vs arms first), however had difficulty threading RUE through sleeve using this technique. Anticipate patient will advance to set-up level given additional time for UB strengthening.   Strengths: Able to follow instructions, Alert and oriented, Effective communication skills, Good carryover of learning, Good insight into deficits/needs, Independent prior level of function, Making steady progress towards goals, Manages pain appropriately, Motivated for self care and independence, Pleasant and cooperative, Supportive family, Willingly participates in therapeutic activities  Barriers: Generalized weakness, Impaired activity tolerance, Impaired balance    Plan    UB strengthening to facilitate independence with ADLs/IADLs, activity tolerance/endurance, standing balance     Occupational Therapy Goals     Problem: Bathing     Dates: Start: 11/12/20       Goal: STG-Within one week, patient will bathe     Dates: Start: 11/12/20       Description: 1) Individualized Goal:  SBA   2) Interventions:  OT Orthotics Training, OT Self Care/ADL, OT Manual Ther Technique, OT Neuro Re-Ed/Balance, OT Sensory Int Techniques, OT Therapeutic Activity, OT Evaluation, and OT Therapeutic Exercise                  Problem: Dressing     Dates: Start: 11/12/20       Goal: STG-Within one week, patient will dress LB     Dates: Start: 11/12/20       Description: 1) Individualized Goal:  SBA   2) Interventions:  OT Orthotics Training, OT Self Care/ADL, OT Manual Ther Technique, OT Neuro Re-Ed/Balance, OT Sensory Int Techniques, OT Therapeutic Activity, OT Evaluation, and OT Therapeutic Exercise                  Problem: IADL's     Dates: Start: 11/12/20       Goal: STG-Within one week, patient will prepare a meal     Dates: Start: 11/12/20       Description: 1) Individualized Goal:  SBA while standing     2) Interventions:  OT Orthotics Training, OT Self  Care/ADL, OT Manual Ther Technique, OT Neuro Re-Ed/Balance, OT Sensory Int Techniques, OT Therapeutic Activity, OT Evaluation, and OT Therapeutic Exercise                  Problem: OT Long Term Goals     Dates: Start: 11/12/20       Goal: LTG-By discharge, patient will complete basic self care tasks     Dates: Start: 11/12/20       Description: 1) Individualized Goal:  Mod I   2) Interventions:  OT Orthotics Training, OT Self Care/ADL, OT Manual Ther Technique, OT Neuro Re-Ed/Balance, OT Sensory Int Techniques, OT Therapeutic Activity, OT Evaluation, and OT Therapeutic Exercise            Goal: LTG-By discharge, patient will perform bathroom transfers     Dates: Start: 11/12/20       Description: 1) Individualized Goal:  Mod I    2) Interventions:  OT Orthotics Training, OT Self Care/ADL, OT Manual Ther Technique, OT Neuro Re-Ed/Balance, OT Sensory Int Techniques, OT Therapeutic Activity, OT Evaluation, and OT Therapeutic Exercise

## 2020-11-16 LAB
GLUCOSE BLD-MCNC: 156 MG/DL (ref 65–99)
GLUCOSE BLD-MCNC: 181 MG/DL (ref 65–99)
GLUCOSE BLD-MCNC: 192 MG/DL (ref 65–99)
GLUCOSE BLD-MCNC: 222 MG/DL (ref 65–99)

## 2020-11-16 PROCEDURE — 97130 THER IVNTJ EA ADDL 15 MIN: CPT

## 2020-11-16 PROCEDURE — 97535 SELF CARE MNGMENT TRAINING: CPT

## 2020-11-16 PROCEDURE — 700102 HCHG RX REV CODE 250 W/ 637 OVERRIDE(OP): Performed by: PHYSICAL MEDICINE & REHABILITATION

## 2020-11-16 PROCEDURE — 82962 GLUCOSE BLOOD TEST: CPT | Mod: 91

## 2020-11-16 PROCEDURE — 770010 HCHG ROOM/CARE - REHAB SEMI PRIVAT*

## 2020-11-16 PROCEDURE — 94760 N-INVAS EAR/PLS OXIMETRY 1: CPT

## 2020-11-16 PROCEDURE — 99233 SBSQ HOSP IP/OBS HIGH 50: CPT | Performed by: PHYSICAL MEDICINE & REHABILITATION

## 2020-11-16 PROCEDURE — 700101 HCHG RX REV CODE 250: Performed by: PHYSICAL MEDICINE & REHABILITATION

## 2020-11-16 PROCEDURE — 97112 NEUROMUSCULAR REEDUCATION: CPT

## 2020-11-16 PROCEDURE — 97129 THER IVNTJ 1ST 15 MIN: CPT

## 2020-11-16 PROCEDURE — 97110 THERAPEUTIC EXERCISES: CPT

## 2020-11-16 PROCEDURE — A9270 NON-COVERED ITEM OR SERVICE: HCPCS | Performed by: PHYSICAL MEDICINE & REHABILITATION

## 2020-11-16 PROCEDURE — 97530 THERAPEUTIC ACTIVITIES: CPT

## 2020-11-16 PROCEDURE — 700111 HCHG RX REV CODE 636 W/ 250 OVERRIDE (IP): Performed by: PHYSICAL MEDICINE & REHABILITATION

## 2020-11-16 RX ORDER — DEXAMETHASONE 1 MG
2 TABLET ORAL EVERY 12 HOURS
Status: DISCONTINUED | OUTPATIENT
Start: 2020-11-16 | End: 2020-11-18

## 2020-11-16 RX ORDER — METHOCARBAMOL 500 MG/1
500 TABLET, FILM COATED ORAL EVERY 4 HOURS PRN
Status: DISCONTINUED | OUTPATIENT
Start: 2020-11-16 | End: 2020-11-20 | Stop reason: HOSPADM

## 2020-11-16 RX ORDER — GABAPENTIN 300 MG/1
900 CAPSULE ORAL 3 TIMES DAILY
Status: DISCONTINUED | OUTPATIENT
Start: 2020-11-16 | End: 2020-11-20 | Stop reason: HOSPADM

## 2020-11-16 RX ADMIN — GABAPENTIN 900 MG: 300 CAPSULE ORAL at 14:48

## 2020-11-16 RX ADMIN — MULTIPLE VITAMINS W/ MINERALS TAB 1 TABLET: TAB at 11:48

## 2020-11-16 RX ADMIN — UMECLIDINIUM BROMIDE AND VILANTEROL TRIFENATATE 1 PUFF: 62.5; 25 POWDER RESPIRATORY (INHALATION) at 08:55

## 2020-11-16 RX ADMIN — LOVASTATIN 40 MG: 20 TABLET ORAL at 08:23

## 2020-11-16 RX ADMIN — LIDOCAINE 2 PATCH: 50 PATCH TOPICAL at 08:55

## 2020-11-16 RX ADMIN — INSULIN HUMAN 3 UNITS: 100 INJECTION, SOLUTION PARENTERAL at 21:02

## 2020-11-16 RX ADMIN — THERA TABS 1 TABLET: TAB at 08:24

## 2020-11-16 RX ADMIN — CALCIUM CARBONATE (ANTACID) CHEW TAB 500 MG 500 MG: 500 CHEW TAB at 08:24

## 2020-11-16 RX ADMIN — SITAGLIPTIN 100 MG: 50 TABLET, FILM COATED ORAL at 08:23

## 2020-11-16 RX ADMIN — FLUTICASONE PROPIONATE 88 MCG: 44 AEROSOL, METERED RESPIRATORY (INHALATION) at 21:01

## 2020-11-16 RX ADMIN — ENOXAPARIN SODIUM 40 MG: 40 INJECTION SUBCUTANEOUS at 17:22

## 2020-11-16 RX ADMIN — METFORMIN HYDROCHLORIDE 1000 MG: 500 TABLET, EXTENDED RELEASE ORAL at 17:22

## 2020-11-16 RX ADMIN — FLUTICASONE PROPIONATE 88 MCG: 44 AEROSOL, METERED RESPIRATORY (INHALATION) at 08:55

## 2020-11-16 RX ADMIN — DEXAMETHASONE 4 MG: 4 TABLET ORAL at 08:24

## 2020-11-16 RX ADMIN — METHOCARBAMOL 500 MG: 500 TABLET, FILM COATED ORAL at 08:23

## 2020-11-16 RX ADMIN — INSULIN HUMAN 2 UNITS: 100 INJECTION, SOLUTION PARENTERAL at 11:32

## 2020-11-16 RX ADMIN — ACETAMINOPHEN 1000 MG: 500 TABLET, FILM COATED ORAL at 08:23

## 2020-11-16 RX ADMIN — GABAPENTIN 600 MG: 300 CAPSULE ORAL at 08:23

## 2020-11-16 RX ADMIN — INSULIN HUMAN 2 UNITS: 100 INJECTION, SOLUTION PARENTERAL at 17:26

## 2020-11-16 RX ADMIN — FAMOTIDINE 20 MG: 20 TABLET, FILM COATED ORAL at 21:01

## 2020-11-16 RX ADMIN — DEXAMETHASONE 2 MG: 1 TABLET ORAL at 21:03

## 2020-11-16 RX ADMIN — GABAPENTIN 900 MG: 300 CAPSULE ORAL at 21:01

## 2020-11-16 RX ADMIN — INSULIN HUMAN 2 UNITS: 100 INJECTION, SOLUTION PARENTERAL at 08:59

## 2020-11-16 RX ADMIN — FAMOTIDINE 20 MG: 20 TABLET, FILM COATED ORAL at 08:23

## 2020-11-16 ASSESSMENT — ACTIVITIES OF DAILY LIVING (ADL)
TOILET_TRANSFER_DESCRIPTION: GRAB BAR;INCREASED TIME
TOILETING_LEVEL_OF_ASSIST_DESCRIPTION: GRAB BAR;INCREASED TIME

## 2020-11-16 ASSESSMENT — GAIT ASSESSMENTS
GAIT LEVEL OF ASSIST: MODIFIED INDEPENDENT
DISTANCE (FEET): 350
DEVIATION: BRADYKINETIC;INCREASED BASE OF SUPPORT

## 2020-11-16 NOTE — THERAPY
"Physical Therapy   Daily Treatment     Patient Name: Isaias Ortiz  Age:  80 y.o., Sex:  male  Medical Record #: [id number]  Today's Date: 11/16/2020     Precautions  Precautions: (P) Fall Risk, Cervical Collar  , Spinal / Back Precautions   Comments: (P) C-collar on at all times except for shower/meals    Subjective    Pt was sitting in w/c upon arrival and agreeable to tx     Objective       11/16/20 0901   Precautions   Precautions Fall Risk;Cervical Collar  ;Spinal / Back Precautions    Comments C-collar on at all times except for shower/meals   Gait Functional Level of Assist    Gait Level Of Assist Modified Independent   Assistive Device None   Distance (Feet) 350  (level terrain)   # of Times Distance was Traveled 2   Deviation Bradykinetic;Increased Base Of Support   Stairs Functional Level of Assist   Level of Assist with Stairs Supervised   # of Stairs Climbed 12   Stairs Description Hand rails;Extra time  (step through pattern)   Transfer Functional Level of Assist   Bed, Chair, Wheelchair Transfer Modified Independent   Bed Chair Wheelchair Transfer Description   (SPT no AD)   Standing Lower Body Exercises   Hamstring Curl 2 sets of 10;Right ;Left   Hip Flexion 2 sets of 10;Right ;Left   Hip Extension 2 sets of 10;Right ;Left   Hip Abduction 2 sets of 10;Right ;Left   Marching 2 sets of 10   Heel Rise 3 sets of 15;Bilateral   Toe Rise 3 sets of 15;Bilateral   Mini Squat 2 sets of 10;Full    Comments no UE support, issued handout as HEP   Interdisciplinary Plan of Care Collaboration   Patient Position at End of Therapy In Bed;Call Light within Reach;Tray Table within Reach;Phone within Reach   PT Total Time Spent   PT Individual Total Time Spent (Mins) 60   PT Charge Group   PT Therapeutic Exercise 3   PT Therapeutic Activities 1     Floor recovery mod I with use of chair; education on prevention of falls in the home with handout provided    6\" curb no AD x 4 with SBA    Ice provided for B shoulders at end of " "session in supine with 3x 10 B ER and 3 x 10 alternating shoulder flexion    Assessment  Pt continues to be limited by coordination, standing balance and proximal stability but demonstrates good carryover of learning and safety awareness.    Strengths: Willingly participates in therapeutic activities, Pleasant and cooperative, Supportive family, Motivated for self care and independence, Manages pain appropriately, Independent prior level of function, Good insight into deficits/needs, Good endurance, Good carryover of learning, Effective communication skills, Alert and oriented, Adequate strength  Barriers: Impaired activity tolerance, Impaired balance, Home accessibility, Fatigue, Decreased endurance    Plan  Standing balance, AMB on uneven surface, R/L UE ROM, endurance in AMB, obstacle avoidance, stair training, STS training from decreased height surfaces, proximal stability       Physical Therapy Problems     Problem: Mobility     Dates: Start: 11/12/20       Goal: STG-Within one week, patient will ambulate up/down flight of stairs     Dates: Start: 11/12/20       Description: 1) Individualized goal:  12 x 1 6\" steps mod I  2) Interventions:  PT Gait Training, PT Therapeutic Exercises, PT Neuro Re-Ed/Balance, PT Therapeutic Activity, PT Manual Therapy, and PT Evaluation      Note:     Goal Note filed on 11/16/20 0820 by Shaniqua Ray, PT    Pt limited by standing balance at this time                        Problem: PT-Long Term Goals     Dates: Start: 11/12/20       Goal: LTG-By discharge, patient will ambulate     Dates: Start: 11/12/20       Description: 1) Individualized goal: 400ft x 1 mod I no AD  2) Interventions:  PT Gait Training, PT Therapeutic Exercises, PT Neuro Re-Ed/Balance, PT Therapeutic Activity, PT Manual Therapy, and PT Evaluation            Goal: LTG-By discharge, patient will perform home exercise program     Dates: Start: 11/12/20       Description: 1) Individualized goal:  for standing " "balance, mod I  2) Interventions:  PT Gait Training, PT Therapeutic Exercises, PT Neuro Re-Ed/Balance, PT Therapeutic Activity, PT Manual Therapy, and PT Evaluation            Goal: LTG-By discharge, patient will ambulate up/down flight of stairs     Dates: Start: 11/12/20       Description: 1) Individualized goal:  4 x 1 6\" steps no HR CGA  2) Interventions:  PT Gait Training, PT Therapeutic Exercises, PT Neuro Re-Ed/Balance, PT Therapeutic Activity, PT Manual Therapy, and PT Evaluation            Goal: LTG-By discharge, patient will transfer in/out of a car     Dates: Start: 11/12/20       Description: 1) Individualized goal:  mod I no AD  2) Interventions:  PT Gait Training, PT Therapeutic Exercises, PT Neuro Re-Ed/Balance, PT Therapeutic Activity, PT Manual Therapy, and PT Evaluation                      "

## 2020-11-16 NOTE — FLOWSHEET NOTE
11/15/20 1610   Events/Summary/Plan   Events/Summary/Plan 02check/PAP check   Skin Inspection Respiratory Device Intact   Vital Signs   Pulse 73   Respiration 16   Pulse Oximetry 93 %   $ Pulse Oximetry (Spot Check) Yes   Respiratory Assessment   Level of Consciousness Alert   Oxygen   O2 (LPM) 0   Non-Invasive Ventilation [facility]   Nocturnal CPAP or BIPAP CPAP - [facility]   Settings (If Known) uto-pap 4-12cm. Cflex 3cm.   FiO2 or LPM   (room air)

## 2020-11-16 NOTE — CARE PLAN
"  Problem: Mobility  Goal: STG-Within one week, patient will ambulate community distances  Description: 1) Individualized goal:  400ft x 1 no AD SBA  2) Interventions:  PT Gait Training, PT Therapeutic Exercises, PT Neuro Re-Ed/Balance, PT Therapeutic Activity, PT Manual Therapy, and PT Evaluation  Outcome: MET  Goal: STG-Within one week, patient will ambulate up/down a curb  Description: 1) Individualized goal:  2 x 1 6\" curb with no AD SBA to mimic home entrance   2) Interventions:  PT Gait Training, PT Therapeutic Exercises, PT Neuro Re-Ed/Balance, PT Therapeutic Activity, PT Manual Therapy, and PT Evaluation    Outcome: MET  Goal: STG-Within one week, patient will ambulate up/down flight of stairs  Description: 1) Individualized goal:  12 x 1 6\" steps mod I  2) Interventions:  PT Gait Training, PT Therapeutic Exercises, PT Neuro Re-Ed/Balance, PT Therapeutic Activity, PT Manual Therapy, and PT Evaluation    Outcome: NOT MET  Note: Pt limited by standing balance at this time     Problem: Mobility Transfers  Goal: STG-Within one week, patient will transfer bed to chair  Description: 1) Individualized goal:  mod I with no AD  2) Interventions:  PT Gait Training, PT Therapeutic Exercises, PT Neuro Re-Ed/Balance, PT Therapeutic Activity, PT Manual Therapy, and PT Evaluation  Outcome: MET     "

## 2020-11-16 NOTE — PROGRESS NOTES
"Rehab Progress Note     Encounter Date: 11/16/2020    CC: LUE weakness and TBI    Interval Events (Subjective)    Improved motion of the left shoulder after stretching with therapy yesterday.  He is now able to put on his mask easily with his left hand.  Still having some weakness in that side in comparison to his right.  He is still having tingling and bilateral hands right greater than left.  Tingling is constant worse with motion, radiating up to the elbows.    He denies any other pain, has been refusing daytime Tylenol    Bowel: No medium soft BM 11/15, did not receive bowel training program last night  Bladder: PVRs less than 50  PRN: No oxycodone    ROS:  Gen: No fatigue, confusion, significant weight loss  Eyes: no blurry vision, double vision or pain in eyes  ENT: no changes in hearing, runny nose, nose bleeds, sinus pain  CV: No CP, palpitations, symptoms of AUTONOMIC DYSREFLEXIA  Lungs: no shortness of breath, changes in secretions, changes in cough, pain with coughing  Abd: No abd pain, nausea, vomiting, pain with eating  : no blood in urine, pain during storage phase, bladder spasms, suprapubic pain  Ext: No swelling in legs, asymmetric atrophy  Neuro: no changes in strength or sensation  Skin: no new ulcers/skin breakdown appreciated by patient  Mood: No changes in mood today, increase in depression or anxiety  Heme: no bruising, or bleeding  Rest of 14 point review of systems is negative    Objective:  Vitals: /62   Pulse 80   Temp 36.8 °C (98.2 °F) (Oral)   Resp 16   Ht 1.753 m (5' 9.02\")   Wt 99.3 kg (219 lb)   SpO2 91%   Gen: NAD, Body mass index is 32.33 kg/m².  HEENT:  NC/AT, PERRLA, moist mucous membranes  Cardio: RRR, no mumurs  Pulm: CTAB, with normal respiratory effort  Abd: Soft NTND, active bowel sounds  Ext: No peripheral edema. No calf tenderness. No clubbing/cyanosis. +dorsal pedalis pulses bilaterally.    Left shoulder: Improve passive and active range of motion of the left " shoulder                              Upper Extremity  Myotome R L   Shoulder flexion C5 5 4   Elbow flexion C5 5 5   Wrist extension C6 4 4   Elbow extension C7 5 4   Finger flexion C8 5 5   Finger abduction T1 5 5          Recent Results (from the past 72 hour(s))   ACCU-CHEK GLUCOSE    Collection Time: 11/13/20 11:20 AM   Result Value Ref Range    Glucose - Accu-Ck 209 (H) 65 - 99 mg/dL   ACCU-CHEK GLUCOSE    Collection Time: 11/13/20  5:29 PM   Result Value Ref Range    Glucose - Accu-Ck 186 (H) 65 - 99 mg/dL   ACCU-CHEK GLUCOSE    Collection Time: 11/13/20  9:03 PM   Result Value Ref Range    Glucose - Accu-Ck 187 (H) 65 - 99 mg/dL   ACCU-CHEK GLUCOSE    Collection Time: 11/14/20  7:19 AM   Result Value Ref Range    Glucose - Accu-Ck 192 (H) 65 - 99 mg/dL   ACCU-CHEK GLUCOSE    Collection Time: 11/14/20 11:28 AM   Result Value Ref Range    Glucose - Accu-Ck 188 (H) 65 - 99 mg/dL   ACCU-CHEK GLUCOSE    Collection Time: 11/14/20  4:41 PM   Result Value Ref Range    Glucose - Accu-Ck 186 (H) 65 - 99 mg/dL   ACCU-CHEK GLUCOSE    Collection Time: 11/14/20  8:28 PM   Result Value Ref Range    Glucose - Accu-Ck 209 (H) 65 - 99 mg/dL   ACCU-CHEK GLUCOSE    Collection Time: 11/15/20  8:03 AM   Result Value Ref Range    Glucose - Accu-Ck 138 (H) 65 - 99 mg/dL   ACCU-CHEK GLUCOSE    Collection Time: 11/15/20 11:34 AM   Result Value Ref Range    Glucose - Accu-Ck 156 (H) 65 - 99 mg/dL   ACCU-CHEK GLUCOSE    Collection Time: 11/15/20  5:30 PM   Result Value Ref Range    Glucose - Accu-Ck 173 (H) 65 - 99 mg/dL   ACCU-CHEK GLUCOSE    Collection Time: 11/15/20  8:35 PM   Result Value Ref Range    Glucose - Accu-Ck 204 (H) 65 - 99 mg/dL       Current Facility-Administered Medications   Medication Frequency   • gabapentin (NEURONTIN) capsule 600 mg TID   • methocarbamol (ROBAXIN) tablet 500 mg TID   • dexamethasone (DECADRON) tablet 4 mg Q12HRS   • umeclidinium-vilanterol (ANORO ELLIPTA) inhaler 1 Puff Daily-0800    And   •  fluticasone (FLOVENT HFA) 44 MCG/ACT inhaler 88 mcg BID   • calcium carbonate (TUMS) chewable tab 500 mg QAM   • enoxaparin (LOVENOX) inj 40 mg DAILY AT 1800   • famotidine (PEPCID) tablet 20 mg BID   • lovastatin (MEVACOR) tablet 40 mg DAILY   • metFORMIN ER (GLUCOPHAGE XR) tablet 1,000 mg PM MEAL   • multivitamin (THERAGRAN) tablet 1 Tab QAM   • SITagliptin (JANUVIA) tablet 100 mg DAILY   • albuterol inhaler 2 Puff Q6HRS PRN   • Pharmacy Consult Request ...Pain Management Review 1 Each PHARMACY TO DOSE   • Respiratory Therapy Consult Continuous RT   • acetaminophen (TYLENOL) tablet 1,000 mg TID   • oxyCODONE immediate-release (ROXICODONE) tablet 5 mg Q3HRS PRN   • oxyCODONE immediate release (ROXICODONE) tablet 10 mg Q3HRS PRN   • acetaminophen (Tylenol) tablet 650 mg Q4HRS PRN   • insulin regular (HumuLIN R,NovoLIN R) injection 4X/DAY ACHS    And   • glucose 4 g chewable tablet 16 g Q15 MIN PRN    And   • dextrose 50% (D50W) injection 50 mL Q15 MIN PRN   • therapeutic multivitamin-minerals (THERAGRAN-M) tablet 1 Tab DAILY WITH LUNCH   • artificial tears ophthalmic solution 1 Drop PRN   • benzocaine-menthol (CEPACOL) lozenge 1 Lozenge Q2HRS PRN   • mag hydrox-al hydrox-simeth (MAALOX PLUS ES or MYLANTA DS) suspension 20 mL Q2HRS PRN   • ondansetron (ZOFRAN ODT) dispertab 4 mg 4X/DAY PRN    Or   • ondansetron (ZOFRAN) syringe/vial injection 4 mg 4X/DAY PRN   • traZODone (DESYREL) tablet 50 mg QHS PRN   • sodium chloride (OCEAN) 0.65 % nasal spray 2 Spray PRN   • midazolam (VERSED) 5 mg/mL (1 mL vial) PRN   • lidocaine (LIDODERM) 5 % 2 Patch Q24HR   • midodrine (PROAMATINE) tablet 10 mg Q4HRS PRN   • sennosides (SENOKOT) 8.6 MG tablet 17.2 mg DAILY AT NOON    And   • docusate sodium (COLACE) capsule 200 mg BID    And   • bisacodyl (THE MAGIC BULLET) suppository 10 mg QDAY    And   • magnesium hydroxide (MILK OF MAGNESIA) suspension 30 mL QDAY PRN       Orders Placed This Encounter   Procedures   • Diet Order Diet:  Level 7 - Easy to Chew; Liquid level: Level 0 - Thin; Second Modifier: (optional): Consistent CHO (Diabetic); Tray Modifications (optional): No Straws     Standing Status:   Standing     Number of Occurrences:   1     Order Specific Question:   Diet:     Answer:   Level 7 - Easy to Chew [22]     Order Specific Question:   Liquid level     Answer:   Level 0 - Thin     Order Specific Question:   Second Modifier: (optional)     Answer:   Consistent CHO (Diabetic) [4]     Order Specific Question:   Tray Modifications (optional)     Answer:   No Straws       Assessment:  Active Hospital Problems    Diagnosis   • *Traumatic subdural hemorrhage (HCC)   • Oropharyngeal dysphagia   • Injury of vertebral artery, right, initial encounter   • C6 cervical fracture (HCC)   • Syncope and collapse   • DM2 (diabetes mellitus, type 2) ([facility])   • Traumatic closed nondisplaced fracture of proximal end of left humerus, initial encounter   • Dyslipidemia   • Neurogenic bladder   • Neurogenic bowel   • Neuropathic pain   • Acute pain due to trauma       Medical Decision Making and Plan:  TBI: Subdural hematoma, mechanical fall, no fractures  -Nonsurgical management  -Limit Haldol or benzodiazepines as these been shown to alter CNS recovery  -Keppra 500 mg twice daily for posttraumatic seizure prophylaxis, total of 7 days, no seizure activity reported, DC Keppra as no episodes of seizure  -No need for neuro stimulants at this time  -Continue comprehensive acute inpatient rehabilitation    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 Alvarez.  Left upper extremity weakness and paresthesias.  -Dexamethasone 4 mg IV every 6 hours x4 doses, transitioned to p.o. on 11/11.  Decreased dose to 4 mg every 12 hours on 11/12 -> 2mg q12hr.   -Continue comprehensive acute inpatient rehabilitation     Agitation: Impulsive behavior, pleasantly confused  -No  need for propanolol during acute rehabilitation, significant improvement in use of call light and insight.    COPD: will f/u with family on his home inhalers  -Consult respiratory therapy  -Supplemental oxygen as per guidelines  -Anoro Ellipta 1 puff every day  -Flovent 88 mcg every 12 hours    RACQUEL: f/u with CPAP settings  -CPAP on at night, home settings     Diabetes: Type II, With hyperglycemia, worsened by dexamethasone  -Januvia 100 mg daily  -Metformin ER 1000 mg every afternoon with meal  -Sliding scale insulin  -FSBG before every meal and nightly     Neurogenic bladder: Monitor PVRs  -Successful voiding trial with minimal PVRs, less than 50 cc, DC voiding trial    Neurogenic bowel:  -Upper motor neuron neurogenic bowel program with Colace 200 mg twice daily, senna 2 tablets q. noon Dulcolax  -KUB 11/11 shows no significant stool load, moderately hard stool present in the descending colon.     Potential for orthostatic hypotension: With cervical spine injury patient is at high risk for orthostatic hypotension  -Midodrine 5 mg every 4 hours as needed systolic blood pressure less than 100     Dysphagia  Following an anterior approach for cervical spinal fusion, dysphasia is very common. Plan to have the speech-language pathology team continue to evaluate the patient and make recommendations for the proper diet as the patient continues to progress.    Pain  #Neuropathic pain:  -Increase gabapentin to 900 mg 3 times a day     #Acute pain, posttraumatic  -Tylenol 1000 mg 3 times daily  -Oxycodone 5-10 mg every 3 hours as needed pain  -Lidocaine patch either side of posterior neck, on for 12 hours off for 12 hours  -Decrease Robaxin to 500 mg 3 times daily, could alter cognitive status    Vitamin D deficiency: Vitamin D level of 36 on admission, goal of greater than 30  -No need for cholecalciferol supplementation at this time     DVT prophylaxis  -Lovenox 40 mg daily.  This medication may be necessary for up to 3  months depending on the functional status and clinical situation of the patient as the injury evolves.        IDT Team Meeting 2020    I, Wilfredo Chacon D.O., was present and led the interdisciplinary team conference on 2020.  I led the IDT conference and agree with the IDT conference documentation and plan of care as noted below.     RN:  Diet Regular diet   % Meal     Pain Increasing gabapentin   Sleep    Bowel 3 BM's yesterday, soft   Bladder Voiding well   In's & Out's    Incision looks good    Barriers: shoulder pain    PT:  Bed Mobility Mod I   Transfers Mod I   Mobility Mod I   Stairs      Barriers: Coordination is still limited  outpt PT    OT:  Eating    Grooming    Bathing    UB Dressing Min A   LB Dressing    Toileting    Shower & Transfer      Barriers: upper body strengthening     SLP:  St/3      Resp:  RA during the day and CPAP at night      CM:  Continues to work on disposition and DME needs.      DC/Disposition:      Decrease PT and transition to SLP    Patient was seen for 36 minutes on unit/floor of which > 50% of time was spent on counseling and coordination of care regarding the above, including prognosis, risk reduction, benefits of treatment, and options for next stage of care including traumatic brain injury, decrease steroids to 2 mg every 12 hours, neuropathic pain, increase gabapentin to 900 mg 3 times a day, coordination of care as per IDT above.        Wilfredo Chacon D.O.

## 2020-11-16 NOTE — THERAPY
Speech Language Pathology  Daily Treatment     Patient Name: Isaias Ortiz  Age:  80 y.o., Sex:  male  Medical Record #: [id number]  Today's Date: 11/15/2020     Precautions  Precautions: Fall Risk, Cervical Collar  , Spinal / Back Precautions   Comments: C-collar on at all times except for shower/meals    Subjective    Pt pleasant and cooperative during tx.       Objective       11/15/20 1301   Cognition   Functional Memory Activities Minimal (4)   Executive Functioning / Organization Minimal (4)   SLP Total Time Spent   SLP Individual Total Time Spent (Mins) 60   Treatment Charges   SLP Cognitive Skill Development First 15 Minutes 1   SLP Cognitive Skill Development Additional 15 Minutes 3       Assessment    Recalled RWAVS: 0% independent; 100% modA.  Logical schedulin% [place], 100% given min-mod verbal cues.   Deduction puzzle: 100% mod-maxA.  Pt recalled daily events with 100% accuracy (given min verbal cues to recall therapist names.)     Strengths: Supportive family, Willingly participates in therapeutic activities, Pleasant and cooperative, Motivated for self care and independence    Plan    Create med list/review meds, executive function, recall, RWAVS, use of memory book.     Speech Therapy Problems     Problem: Memory STGs     Dates: Start: 20       Goal: STG-Within one week, patient will     Dates: Start: 20       Description: 1) Individualized goal:  demonstrate use of memory strategies in order to recall daily events 80% of the time.   2) Interventions:  SLP Speech Language Treatment, SLP Self Care / ADL Training , SLP Cognitive Skill Development, and SLP Group Treatment                Problem: Problem Solving STGs     Dates: Start: 20       Goal: STG-Within one week, patient will solve complex problems     Dates: Start: 20       Description: 1) Individualized goal:  related to Medication management with 90% accuracy given SPV  2) Interventions:  SLP Speech Language Treatment, SLP  Self Care / ADL Training , SLP Cognitive Skill Development, and SLP Group Treatment                Problem: Speech/Swallowing LTGs     Dates: Start: 11/12/20       Goal: LTG-By discharge, patient will solve complex problem     Dates: Start: 11/12/20       Description: 1) Individualized goal:  related to IADL's in order to safely d/c home with mod I  2) Interventions:  SLP Speech Language Treatment, SLP Self Care / ADL Training , SLP Cognitive Skill Development, and SLP Group Treatment

## 2020-11-16 NOTE — THERAPY
Occupational Therapy  Daily Treatment     Patient Name: Isaias Ortiz  Age:  80 y.o., Sex:  male  Medical Record #: [id number]  Today's Date: 11/16/2020     Precautions  Precautions: Fall Risk, Cervical Collar  , Spinal / Back Precautions   Comments: C-collar on at all times except for shower/meals    Safety   Comments: Mod I in room for bed/toilet txfrs, no AD    Subjective    Patient sleeping upon arrival, easily arousable.  Agreeable to participate in OT.      Objective     11/16/20 0701   Precautions   Precautions Fall Risk;Cervical Collar  ;Spinal / Back Precautions    Comments C-collar on at all times except for shower/meals   Safety    Comments Mod I in room for bed/toilet txfrs, no AD   Vitals   O2 Delivery Device None - Room Air   Cognition    Level of Consciousness Alert   Functional Level of Assist   Grooming Modified Independent  (for washing hands in standing )   Toileting Modified Independent   Toileting Description Grab bar;Increased time   Toilet Transfers Modified Independent   Toilet Transfer Description Grab bar;Increased time   Interdisciplinary Plan of Care Collaboration   IDT Collaboration with  Therapy Tech   Patient Position at End of Therapy Seated  (in T-dine)   Collaboration Comments Transferred care to therapy tech in T-Dine    OT Total Time Spent   OT Individual Total Time Spent (Mins) 60   OT Charge Group   OT Self Care / ADL 1   OT Neuromuscular Re-education / Balance 1   OT Therapeutic Exercise  2     Supine therex on mat:  - 3 sets of 10 shoulder flexion and bench press, Bilateral (w/ 2# weighted dowel)  - 3 sets of 10 bicep curls, Bilateral (w/ 3# weighted dowel)    Therex in standing w/ mod resistance TB  - 2 sets of 10 rows (R/L)  - 1 set of 10 internal/external rotation (R/L)    Fluidobike in standing x 5 mins (level 4 resistance x 2 mins, level 2 x 3 mins), VCs for technique (ie using UEs only vs compensating w/ trunk)    Supervision for functional mobility room > therapy gym > dining  room, no AD    Rubber tubing provided and patient instructed how to don using figure 8 technique (to facilitate shoulder retraction)    Assessment    Patient tolerated OT session well with focus on ADLs, UB strengthening and endurance. Patient performs toileting and toilet txfrs @ mod I level and LB dressing completed with set-up EOB. Undersigned therapist assisted patient w/ retrieving shoes in order to adhere to spinal precautions. Reacher provided to facilitate independence with future LB dressing tasks. Patient continues to present extremely motivated and eager throughout session.   Strengths: Able to follow instructions, Alert and oriented, Effective communication skills, Good carryover of learning, Good insight into deficits/needs, Independent prior level of function, Making steady progress towards goals, Manages pain appropriately, Motivated for self care and independence, Pleasant and cooperative, Supportive family, Willingly participates in therapeutic activities  Barriers: Generalized weakness, Impaired activity tolerance, Impaired balance    Plan    UB strengthening to facilitate independence with ADLs/IADLs, activity tolerance/endurance, standing balance    Occupational Therapy Goals     Problem: Bathing     Dates: Start: 11/12/20       Goal: STG-Within one week, patient will bathe     Dates: Start: 11/12/20       Description: 1) Individualized Goal:  SBA   2) Interventions:  OT Orthotics Training, OT Self Care/ADL, OT Manual Ther Technique, OT Neuro Re-Ed/Balance, OT Sensory Int Techniques, OT Therapeutic Activity, OT Evaluation, and OT Therapeutic Exercise                  Problem: Dressing     Dates: Start: 11/12/20       Goal: STG-Within one week, patient will dress LB     Dates: Start: 11/12/20       Description: 1) Individualized Goal:  SBA   2) Interventions:  OT Orthotics Training, OT Self Care/ADL, OT Manual Ther Technique, OT Neuro Re-Ed/Balance, OT Sensory Int Techniques, OT Therapeutic  Activity, OT Evaluation, and OT Therapeutic Exercise                  Problem: IADL's     Dates: Start: 11/12/20       Goal: STG-Within one week, patient will prepare a meal     Dates: Start: 11/12/20       Description: 1) Individualized Goal:  SBA while standing     2) Interventions:  OT Orthotics Training, OT Self Care/ADL, OT Manual Ther Technique, OT Neuro Re-Ed/Balance, OT Sensory Int Techniques, OT Therapeutic Activity, OT Evaluation, and OT Therapeutic Exercise                  Problem: OT Long Term Goals     Dates: Start: 11/12/20       Goal: LTG-By discharge, patient will complete basic self care tasks     Dates: Start: 11/12/20       Description: 1) Individualized Goal:  Mod I   2) Interventions:  OT Orthotics Training, OT Self Care/ADL, OT Manual Ther Technique, OT Neuro Re-Ed/Balance, OT Sensory Int Techniques, OT Therapeutic Activity, OT Evaluation, and OT Therapeutic Exercise            Goal: LTG-By discharge, patient will perform bathroom transfers     Dates: Start: 11/12/20       Description: 1) Individualized Goal:  Mod I    2) Interventions:  OT Orthotics Training, OT Self Care/ADL, OT Manual Ther Technique, OT Neuro Re-Ed/Balance, OT Sensory Int Techniques, OT Therapeutic Activity, OT Evaluation, and OT Therapeutic Exercise

## 2020-11-17 LAB
GLUCOSE BLD-MCNC: 126 MG/DL (ref 65–99)
GLUCOSE BLD-MCNC: 152 MG/DL (ref 65–99)
GLUCOSE BLD-MCNC: 176 MG/DL (ref 65–99)
GLUCOSE BLD-MCNC: 192 MG/DL (ref 65–99)

## 2020-11-17 PROCEDURE — 700102 HCHG RX REV CODE 250 W/ 637 OVERRIDE(OP): Performed by: PHYSICAL MEDICINE & REHABILITATION

## 2020-11-17 PROCEDURE — 97535 SELF CARE MNGMENT TRAINING: CPT

## 2020-11-17 PROCEDURE — 82962 GLUCOSE BLOOD TEST: CPT | Mod: 91

## 2020-11-17 PROCEDURE — 97140 MANUAL THERAPY 1/> REGIONS: CPT

## 2020-11-17 PROCEDURE — 99232 SBSQ HOSP IP/OBS MODERATE 35: CPT | Performed by: PHYSICAL MEDICINE & REHABILITATION

## 2020-11-17 PROCEDURE — 770010 HCHG ROOM/CARE - REHAB SEMI PRIVAT*

## 2020-11-17 PROCEDURE — 700111 HCHG RX REV CODE 636 W/ 250 OVERRIDE (IP): Performed by: PHYSICAL MEDICINE & REHABILITATION

## 2020-11-17 PROCEDURE — 92526 ORAL FUNCTION THERAPY: CPT

## 2020-11-17 PROCEDURE — 97112 NEUROMUSCULAR REEDUCATION: CPT

## 2020-11-17 PROCEDURE — 700101 HCHG RX REV CODE 250: Performed by: PHYSICAL MEDICINE & REHABILITATION

## 2020-11-17 PROCEDURE — 97530 THERAPEUTIC ACTIVITIES: CPT

## 2020-11-17 PROCEDURE — 97129 THER IVNTJ 1ST 15 MIN: CPT

## 2020-11-17 PROCEDURE — A9270 NON-COVERED ITEM OR SERVICE: HCPCS | Performed by: PHYSICAL MEDICINE & REHABILITATION

## 2020-11-17 PROCEDURE — 97130 THER IVNTJ EA ADDL 15 MIN: CPT

## 2020-11-17 RX ORDER — PSEUDOEPHEDRINE HCL 30 MG
100 TABLET ORAL 2 TIMES DAILY
Qty: 60 CAP | Refills: 2 | Status: ON HOLD | OUTPATIENT
Start: 2020-11-17 | End: 2020-11-22 | Stop reason: SDUPTHER

## 2020-11-17 RX ORDER — SENNOSIDES A AND B 8.6 MG/1
17.2 TABLET, FILM COATED ORAL DAILY
Qty: 60 TAB | Refills: 0 | Status: ON HOLD | OUTPATIENT
Start: 2020-11-17 | End: 2020-11-22 | Stop reason: SDUPTHER

## 2020-11-17 RX ORDER — CALCIUM CARBONATE 500 MG/1
500 TABLET, CHEWABLE ORAL EVERY MORNING
Qty: 30 TAB | Refills: 0 | Status: ON HOLD | OUTPATIENT
Start: 2020-11-18 | End: 2020-11-22

## 2020-11-17 RX ORDER — GABAPENTIN 300 MG/1
900 CAPSULE ORAL 3 TIMES DAILY
Qty: 270 CAP | Refills: 2 | Status: ON HOLD | OUTPATIENT
Start: 2020-11-17 | End: 2020-11-22 | Stop reason: SDUPTHER

## 2020-11-17 RX ORDER — BISACODYL 10 MG/1
10 SUPPOSITORY RECTAL
Status: DISCONTINUED | OUTPATIENT
Start: 2020-11-17 | End: 2020-11-20 | Stop reason: HOSPADM

## 2020-11-17 RX ORDER — SENNOSIDES A AND B 8.6 MG/1
17.2 TABLET, FILM COATED ORAL
Status: DISCONTINUED | OUTPATIENT
Start: 2020-11-18 | End: 2020-11-20 | Stop reason: HOSPADM

## 2020-11-17 RX ORDER — OXYCODONE HYDROCHLORIDE 5 MG/1
5 TABLET ORAL EVERY 6 HOURS PRN
Qty: 21 TAB | Refills: 0 | Status: ON HOLD | OUTPATIENT
Start: 2020-11-17 | End: 2020-11-22

## 2020-11-17 RX ORDER — FAMOTIDINE 20 MG/1
20 TABLET, FILM COATED ORAL 2 TIMES DAILY
Qty: 60 TAB | Refills: 2 | Status: ON HOLD | OUTPATIENT
Start: 2020-11-17 | End: 2020-11-22 | Stop reason: SDUPTHER

## 2020-11-17 RX ORDER — DOCUSATE SODIUM 100 MG/1
100 CAPSULE, LIQUID FILLED ORAL 2 TIMES DAILY
Status: DISCONTINUED | OUTPATIENT
Start: 2020-11-17 | End: 2020-11-20 | Stop reason: HOSPADM

## 2020-11-17 RX ADMIN — INSULIN HUMAN 2 UNITS: 100 INJECTION, SOLUTION PARENTERAL at 20:48

## 2020-11-17 RX ADMIN — UMECLIDINIUM BROMIDE AND VILANTEROL TRIFENATATE 1 PUFF: 62.5; 25 POWDER RESPIRATORY (INHALATION) at 07:47

## 2020-11-17 RX ADMIN — SENNOSIDES 17.2 MG: 8.6 TABLET, FILM COATED ORAL at 11:22

## 2020-11-17 RX ADMIN — FLUTICASONE PROPIONATE 88 MCG: 44 AEROSOL, METERED RESPIRATORY (INHALATION) at 07:43

## 2020-11-17 RX ADMIN — FAMOTIDINE 20 MG: 20 TABLET, FILM COATED ORAL at 20:40

## 2020-11-17 RX ADMIN — MULTIPLE VITAMINS W/ MINERALS TAB 1 TABLET: TAB at 11:22

## 2020-11-17 RX ADMIN — DEXAMETHASONE 2 MG: 1 TABLET ORAL at 20:40

## 2020-11-17 RX ADMIN — LOVASTATIN 40 MG: 20 TABLET ORAL at 07:43

## 2020-11-17 RX ADMIN — DEXAMETHASONE 2 MG: 1 TABLET ORAL at 07:47

## 2020-11-17 RX ADMIN — GABAPENTIN 900 MG: 300 CAPSULE ORAL at 07:46

## 2020-11-17 RX ADMIN — LIDOCAINE 2 PATCH: 50 PATCH TOPICAL at 07:43

## 2020-11-17 RX ADMIN — INSULIN HUMAN 2 UNITS: 100 INJECTION, SOLUTION PARENTERAL at 11:24

## 2020-11-17 RX ADMIN — GABAPENTIN 900 MG: 300 CAPSULE ORAL at 20:39

## 2020-11-17 RX ADMIN — GABAPENTIN 900 MG: 300 CAPSULE ORAL at 14:51

## 2020-11-17 RX ADMIN — CALCIUM CARBONATE (ANTACID) CHEW TAB 500 MG 500 MG: 500 CHEW TAB at 07:46

## 2020-11-17 RX ADMIN — FLUTICASONE PROPIONATE 88 MCG: 44 AEROSOL, METERED RESPIRATORY (INHALATION) at 20:42

## 2020-11-17 RX ADMIN — ACETAMINOPHEN 650 MG: 325 TABLET, FILM COATED ORAL at 08:15

## 2020-11-17 RX ADMIN — FAMOTIDINE 20 MG: 20 TABLET, FILM COATED ORAL at 07:47

## 2020-11-17 RX ADMIN — ENOXAPARIN SODIUM 40 MG: 40 INJECTION SUBCUTANEOUS at 17:12

## 2020-11-17 RX ADMIN — INSULIN HUMAN 2 UNITS: 100 INJECTION, SOLUTION PARENTERAL at 07:40

## 2020-11-17 RX ADMIN — SITAGLIPTIN 100 MG: 50 TABLET, FILM COATED ORAL at 07:47

## 2020-11-17 RX ADMIN — THERA TABS 1 TABLET: TAB at 07:47

## 2020-11-17 RX ADMIN — METFORMIN HYDROCHLORIDE 1000 MG: 500 TABLET, EXTENDED RELEASE ORAL at 17:12

## 2020-11-17 NOTE — CARE PLAN
Problem: Infection  Goal: Will remain free from infection  Note: Patient remains free from s/s infection; afebrile.  Will continue to monitor.      Problem: Pain Management  Goal: Pain level will decrease to patient's comfort goal  Note: Pt able to participate in therapies and activities this shift.

## 2020-11-17 NOTE — CARE PLAN
Problem: Communication  Goal: The ability to communicate needs accurately and effectively will improve  Outcome: PROGRESSING AS EXPECTED     Problem: Safety  Goal: Will remain free from injury  Outcome: PROGRESSING AS EXPECTED     Problem: Infection  Goal: Will remain free from infection  Outcome: PROGRESSING AS EXPECTED     Problem: Venous Thromboembolism (VTW)/Deep Vein Thrombosis (DVT) Prevention:  Goal: Patient will participate in Venous Thrombosis (VTE)/Deep Vein Thrombosis (DVT)Prevention Measures  Outcome: PROGRESSING AS EXPECTED     Problem: Skin Integrity  Goal: Risk for impaired skin integrity will decrease  Outcome: PROGRESSING AS EXPECTED

## 2020-11-17 NOTE — CARE PLAN
Problem: Communication  Goal: The ability to communicate needs accurately and effectively will improve  Outcome: PROGRESSING AS EXPECTED     Problem: Safety  Goal: Will remain free from injury  Outcome: PROGRESSING AS EXPECTED     Problem: Bowel/Gastric:  Goal: Will not experience complications related to bowel motility  Intervention: Implement Bowel Protocol, if applicable  Note: Pt refused scheduled suppository at hs.Education given on the importance of procedure to no avail.Per pt report, had large bm during the day.Will continue to monitor.

## 2020-11-17 NOTE — THERAPY
"Speech Language Pathology  Daily Treatment     Patient Name: Isaias Ortiz  Age:  80 y.o., Sex:  male  Medical Record #: [id number]  Today's Date: 11/16/2020     Precautions  Precautions: Fall Risk, Cervical Collar  , Spinal / Back Precautions   Comments: C-collar on at all times except for shower/meals    Subjective    Pt pleasant and cooperative during tx.  Pt and SLP spoke to patient's wife via phone.     Objective       11/16/20 1501   Cognition   Moderate Attention Minimal (4)   Functional Memory Activities Minimal (4)   Medication Management  Supervision (5)   Interdisciplinary Plan of Care Collaboration   IDT Collaboration with  Family / Caregiver   Collaboration Comments phone call with patient's wife.   SLP Total Time Spent   SLP Individual Total Time Spent (Mins) 60   Treatment Charges   SLP Cognitive Skill Development First 15 Minutes 1   SLP Cognitive Skill Development Additional 15 Minutes 3       Assessment    Pt's wife stated that she manages finances, and can assist with medications if necessary.  Pt's wife stated that patient has difficulty, \"connecting the dots during conversations.\"  She noticed this change 3-4 months ago.  Pt's wife would like to know cause of patient's initial blackout.  Pt able to recall 1 of 7 current medications.  Pt sorted medications into a pill box with 100% accuracy given supervision.  Single step word problems involving money: 80% independent; 100% [place].      Strengths: Able to follow instructions, Alert and oriented, Effective communication skills, Good insight into deficits/needs, Independent prior level of function, Making steady progress towards goals, Motivated for self care and independence, Pleasant and cooperative, Willingly participates in therapeutic activities  Barriers: Impaired functional cognition    Plan    Attn, outcome assessment, med sort    Speech Therapy Problems     Problem: Memory STGs     Dates: Start: 11/16/20       Goal: STG-Within one week, patient " will     Dates: Start: 11/16/20       Description: 1) Individualized goal:  recall daily events/and RWAVS memory strategies with 95% accuracy independently.   2) Interventions:  SLP Cognitive Skill Development                    Problem: Problem Solving STGs     Dates: Start: 11/12/20       Goal: STG-Within one week, patient will solve complex problems     Dates: Start: 11/12/20       Description: 1) Individualized goal:  related to Medication management with 90% accuracy given SPV  2) Interventions:  SLP Speech Language Treatment, SLP Self Care / ADL Training , SLP Cognitive Skill Development, and SLP Group Treatment    Note:     Goal Note filed on 11/16/20 0759 by Irvin Reid MS,CCC-SLP    Med list to be reviewed this day.  Pt reports not using pill box, but organizes meds by day in separate bottles.                   Goal: STG-Within one week, patient will     Dates: Start: 11/16/20       Description: 1) Individualized goal:  complete executive function tasks related scheduling/reasoning 100% [place].    2) Interventions:  SLP Cognitive Skill Development                    Problem: Speech/Swallowing LTGs     Dates: Start: 11/12/20       Goal: LTG-By discharge, patient will solve complex problem     Dates: Start: 11/12/20       Description: 1) Individualized goal:  related to IADL's in order to safely d/c home with mod I  2) Interventions:  SLP Speech Language Treatment, SLP Self Care / ADL Training , SLP Cognitive Skill Development, and SLP Group Treatment

## 2020-11-17 NOTE — THERAPY
"Speech Language Pathology  Daily Treatment     Patient Name: Isaias Ortiz  Age:  80 y.o., Sex:  male  Medical Record #: [id number]  Today's Date: 11/17/2020     Precautions  Precautions: Fall Risk, Spinal / Back Precautions , Cervical Collar    Comments: C-collar on at all times except for shower/meals    Subjective    Patient was willing to participate. Participated in ST at 0800 and 1402.     Objective       11/17/20 0802   Cognition   Moderate Attention Minimal (4)   Planning / Decision Making Supervision (5)   Executive Functioning / Organization Minimal (4)   Dysphagia    Diet / Liquid Recommendation Regular - Easy to Chew (7);Thin (0)   SLP Total Time Spent   SLP Individual Total Time Spent (Mins) 90   Treatment Charges   SLP Swallowing Dysfunction Treatment Swallowing Dysfunction Treatment   SLP Cognitive Skill Development First 15 Minutes 1   SLP Cognitive Skill Development Additional 15 Minutes 3       Assessment    Patient was assessed with current diet textures. No overt s/sx of aspiration were noted. Patient verbalized swallow strategies and demonstrated carry-over independently.   Completed finding the \"the\" task. Able to locate 7 targets on first attempt, 10 on second attempt. Mod cues to late remainder. Completed calendar organization task independently.      Strengths: Able to follow instructions, Alert and oriented, Effective communication skills, Good insight into deficits/needs, Independent prior level of function, Making steady progress towards goals, Motivated for self care and independence, Pleasant and cooperative, Willingly participates in therapeutic activities  Barriers: Impaired functional cognition    Plan    Continue to target attention, recall, and executive function tasks.     Speech Therapy Problems     Problem: Memory STGs     Dates: Start: 11/16/20       Goal: STG-Within one week, patient will     Dates: Start: 11/16/20       Description: 1) Individualized goal:  recall daily events/and " RWAVS memory strategies with 95% accuracy independently.   2) Interventions:  SLP Cognitive Skill Development                    Problem: Problem Solving STGs     Dates: Start: 11/12/20       Goal: STG-Within one week, patient will solve complex problems     Dates: Start: 11/12/20       Description: 1) Individualized goal:  related to Medication management with 90% accuracy given SPV  2) Interventions:  SLP Speech Language Treatment, SLP Self Care / ADL Training , SLP Cognitive Skill Development, and SLP Group Treatment    Note:     Goal Note filed on 11/16/20 0759 by Irvin Reid MS,CCC-SLP    Med list to be reviewed this day.  Pt reports not using pill box, but organizes meds by day in separate bottles.                   Goal: STG-Within one week, patient will     Dates: Start: 11/16/20       Description: 1) Individualized goal:  complete executive function tasks related scheduling/reasoning 100% [place].    2) Interventions:  SLP Cognitive Skill Development                    Problem: Speech/Swallowing LTGs     Dates: Start: 11/12/20       Goal: LTG-By discharge, patient will solve complex problem     Dates: Start: 11/12/20       Description: 1) Individualized goal:  related to IADL's in order to safely d/c home with mod I  2) Interventions:  SLP Speech Language Treatment, SLP Self Care / ADL Training , SLP Cognitive Skill Development, and SLP Group Treatment

## 2020-11-17 NOTE — THERAPY
Occupational Therapy  Daily Treatment     Patient Name: Isaias Ortiz  Age:  80 y.o., Sex:  male  Medical Record #: [id number]  Today's Date: 11/17/2020     Precautions  Precautions: (P) Fall Risk, Spinal / Back Precautions , Cervical Collar    Comments: (P) C-collar on at all times except for shower/meals    Safety   Comments: Mod I in room for bed/toilet txfrs, no AD    Subjective    Patient reported shoulder/neck pain improved from 7 to 3/10 following vibration/heat.      Objective     11/17/20 0831   Precautions   Precautions Fall Risk;Spinal / Back Precautions ;Cervical Collar     Comments C-collar on at all times except for shower/meals   Cognition    Level of Consciousness Alert   IADL Treatments   IADL Treatments Meal preparation   Meal Preparation Patient completed meal prep task (fried egg) in standing at SBA to supervision level. Retrieved pan from lower cabinet without breaking spinal precautions and able to retrieve butter and eggs from refrigerator while maintaining good dynamic standing balance. No LOB observed.   Interdisciplinary Plan of Care Collaboration   Patient Position at End of Therapy Seated;Call Light within Reach   OT Total Time Spent   OT Individual Total Time Spent (Mins) 60   OT Charge Group   OT Self Care / ADL 1   OT Neuromuscular Re-education / Balance 1   OT Therapy Activity 2     Fluidobike in standing x 10 minutes (level 2 resistance) with moist heat applied to upper traps.     Vibration applied to B upper traps/levator scapulae for pain management.     Assessment    Patient tolerated OT session well with focus on IADLs, non-pharm pain management and standing tolerance/endurance. Patient demo's ability to perform meal prep tasks in standing @ SBA to supervision level with no LOB observed. Good safety awareness noted throughout task. Significant improvement with neck and shoulder pain reported with vibration and moist heat application.   Strengths: Able to follow instructions, Alert and  oriented, Effective communication skills, Good carryover of learning, Good insight into deficits/needs, Independent prior level of function, Making steady progress towards goals, Manages pain appropriately, Motivated for self care and independence, Pleasant and cooperative, Supportive family, Willingly participates in therapeutic activities  Barriers: Generalized weakness, Impaired activity tolerance, Impaired balance    Plan    DC IR[facility] tomorrow.    Occupational Therapy Goals     Problem: Bathing     Dates: Start: 11/12/20       Goal: STG-Within one week, patient will bathe     Dates: Start: 11/12/20       Description: 1) Individualized Goal:  SBA   2) Interventions:  OT Orthotics Training, OT Self Care/ADL, OT Manual Ther Technique, OT Neuro Re-Ed/Balance, OT Sensory Int Techniques, OT Therapeutic Activity, OT Evaluation, and OT Therapeutic Exercise                  Problem: IADL's     Dates: Start: 11/12/20       Goal: STG-Within one week, patient will prepare a meal     Dates: Start: 11/12/20       Description: 1) Individualized Goal:  SBA while standing     2) Interventions:  OT Orthotics Training, OT Self Care/ADL, OT Manual Ther Technique, OT Neuro Re-Ed/Balance, OT Sensory Int Techniques, OT Therapeutic Activity, OT Evaluation, and OT Therapeutic Exercise                  Problem: OT Long Term Goals     Dates: Start: 11/12/20       Goal: LTG-By discharge, patient will complete basic self care tasks     Dates: Start: 11/12/20       Description: 1) Individualized Goal:  Mod I   2) Interventions:  OT Orthotics Training, OT Self Care/ADL, OT Manual Ther Technique, OT Neuro Re-Ed/Balance, OT Sensory Int Techniques, OT Therapeutic Activity, OT Evaluation, and OT Therapeutic Exercise            Goal: LTG-By discharge, patient will perform bathroom transfers     Dates: Start: 11/12/20       Description: 1) Individualized Goal:  Mod I    2) Interventions:  OT Orthotics Training, OT Self Care/ADL, OT Manual Ther  Technique, OT Neuro Re-Ed/Balance, OT Sensory Int Techniques, OT Therapeutic Activity, OT Evaluation, and OT Therapeutic Exercise

## 2020-11-17 NOTE — PROGRESS NOTES
"Rehab Progress Note     Encounter Date: 11/17/2020    CC: LUE weakness and TBI    Interval Events (Subjective)    He reports pain in his shoulders, muscles are significantly improved today after placement of heat and some mobilization by therapy.  He denies even needing any opioids.    He denies any change in strength with the decrease of Decadron    He denies any burning or tingling going into lower extremities.  This is significantly improved with the increased dose of gabapentin.    He is asking if he will be able to fly in early December.    ROS:  Gen: No fatigue, confusion, significant weight loss  Eyes: no blurry vision, double vision or pain in eyes  ENT: no changes in hearing, runny nose, nose bleeds, sinus pain  CV: No CP, palpitations, symptoms of AUTONOMIC DYSREFLEXIA  Lungs: no shortness of breath, changes in secretions, changes in cough, pain with coughing  Abd: No abd pain, nausea, vomiting, pain with eating  : no blood in urine, pain during storage phase, bladder spasms, suprapubic pain  Ext: No swelling in legs, asymmetric atrophy  Neuro: no changes in strength or sensation  Skin: no new ulcers/skin breakdown appreciated by patient  Mood: No changes in mood today, increase in depression or anxiety  Heme: no bruising, or bleeding  Rest of 14 point review of systems is negative      Objective:  Vitals: /75   Pulse 88   Temp 36.2 °C (97.2 °F) (Temporal)   Resp 18   Ht 1.753 m (5' 9.02\")   Wt 99.3 kg (219 lb)   SpO2 95%   Gen: NAD, Body mass index is 32.33 kg/m².  HEENT: NC/AT, PERRLA, moist mucous membranes  Cardio: RRR, no mumurs  Pulm: CTAB, with normal respiratory effort  Abd: Soft NTND, active bowel sounds,   Ext: No peripheral edema. No calf tenderness. No clubbing/cyanosis. +dorsal pedalis pulses bilaterally.      Left shoulder: Improve passive and active range of motion of the left shoulder                              Upper Extremity  Myotome R L   Shoulder flexion C5 5 4   Elbow " flexion C5 5 5   Wrist extension C6 4 4   Elbow extension C7 5 4   Finger flexion C8 5 5   Finger abduction T1 5 5          Recent Results (from the past 72 hour(s))   ACCU-CHEK GLUCOSE    Collection Time: 11/14/20  4:41 PM   Result Value Ref Range    Glucose - Accu-Ck 186 (H) 65 - 99 mg/dL   ACCU-CHEK GLUCOSE    Collection Time: 11/14/20  8:28 PM   Result Value Ref Range    Glucose - Accu-Ck 209 (H) 65 - 99 mg/dL   ACCU-CHEK GLUCOSE    Collection Time: 11/15/20  8:03 AM   Result Value Ref Range    Glucose - Accu-Ck 138 (H) 65 - 99 mg/dL   ACCU-CHEK GLUCOSE    Collection Time: 11/15/20 11:34 AM   Result Value Ref Range    Glucose - Accu-Ck 156 (H) 65 - 99 mg/dL   ACCU-CHEK GLUCOSE    Collection Time: 11/15/20  5:30 PM   Result Value Ref Range    Glucose - Accu-Ck 173 (H) 65 - 99 mg/dL   ACCU-CHEK GLUCOSE    Collection Time: 11/15/20  8:35 PM   Result Value Ref Range    Glucose - Accu-Ck 204 (H) 65 - 99 mg/dL   ACCU-CHEK GLUCOSE    Collection Time: 11/16/20  8:22 AM   Result Value Ref Range    Glucose - Accu-Ck 181 (H) 65 - 99 mg/dL   ACCU-CHEK GLUCOSE    Collection Time: 11/16/20 11:31 AM   Result Value Ref Range    Glucose - Accu-Ck 156 (H) 65 - 99 mg/dL   ACCU-CHEK GLUCOSE    Collection Time: 11/16/20  5:21 PM   Result Value Ref Range    Glucose - Accu-Ck 192 (H) 65 - 99 mg/dL   ACCU-CHEK GLUCOSE    Collection Time: 11/16/20  8:19 PM   Result Value Ref Range    Glucose - Accu-Ck 222 (H) 65 - 99 mg/dL   ACCU-CHEK GLUCOSE    Collection Time: 11/17/20  7:39 AM   Result Value Ref Range    Glucose - Accu-Ck 152 (H) 65 - 99 mg/dL   ACCU-CHEK GLUCOSE    Collection Time: 11/17/20 11:22 AM   Result Value Ref Range    Glucose - Accu-Ck 192 (H) 65 - 99 mg/dL       Current Facility-Administered Medications   Medication Frequency   • dexamethasone (DECADRON) tablet 2 mg Q12HRS   • gabapentin (NEURONTIN) capsule 900 mg TID   • methocarbamol (ROBAXIN) tablet 500 mg Q4HRS PRN   • influenza Vac High-Dose Quad (Fluzone) injection  0.7 mL Once PRN   • umeclidinium-vilanterol (ANORO ELLIPTA) inhaler 1 Puff Daily-0800    And   • fluticasone (FLOVENT HFA) 44 MCG/ACT inhaler 88 mcg BID   • calcium carbonate (TUMS) chewable tab 500 mg QAM   • enoxaparin (LOVENOX) inj 40 mg DAILY AT 1800   • famotidine (PEPCID) tablet 20 mg BID   • lovastatin (MEVACOR) tablet 40 mg DAILY   • metFORMIN ER (GLUCOPHAGE XR) tablet 1,000 mg PM MEAL   • multivitamin (THERAGRAN) tablet 1 Tab QAM   • SITagliptin (JANUVIA) tablet 100 mg DAILY   • albuterol inhaler 2 Puff Q6HRS PRN   • Pharmacy Consult Request ...Pain Management Review 1 Each PHARMACY TO DOSE   • Respiratory Therapy Consult Continuous RT   • oxyCODONE immediate-release (ROXICODONE) tablet 5 mg Q3HRS PRN   • oxyCODONE immediate release (ROXICODONE) tablet 10 mg Q3HRS PRN   • acetaminophen (Tylenol) tablet 650 mg Q4HRS PRN   • insulin regular (HumuLIN R,NovoLIN R) injection 4X/DAY ACHS    And   • glucose 4 g chewable tablet 16 g Q15 MIN PRN    And   • dextrose 50% (D50W) injection 50 mL Q15 MIN PRN   • therapeutic multivitamin-minerals (THERAGRAN-M) tablet 1 Tab DAILY WITH LUNCH   • artificial tears ophthalmic solution 1 Drop PRN   • benzocaine-menthol (CEPACOL) lozenge 1 Lozenge Q2HRS PRN   • mag hydrox-al hydrox-simeth (MAALOX PLUS ES or MYLANTA DS) suspension 20 mL Q2HRS PRN   • ondansetron (ZOFRAN ODT) dispertab 4 mg 4X/DAY PRN    Or   • ondansetron (ZOFRAN) syringe/vial injection 4 mg 4X/DAY PRN   • traZODone (DESYREL) tablet 50 mg QHS PRN   • sodium chloride (OCEAN) 0.65 % nasal spray 2 Spray PRN   • midazolam (VERSED) 5 mg/mL (1 mL vial) PRN   • lidocaine (LIDODERM) 5 % 2 Patch Q24HR   • midodrine (PROAMATINE) tablet 10 mg Q4HRS PRN   • sennosides (SENOKOT) 8.6 MG tablet 17.2 mg DAILY AT NOON    And   • docusate sodium (COLACE) capsule 200 mg BID    And   • bisacodyl (THE MAGIC BULLET) suppository 10 mg QDAY    And   • magnesium hydroxide (MILK OF MAGNESIA) suspension 30 mL QDAY PRN       Orders Placed  This Encounter   Procedures   • Diet Order Diet: Level 7 - Easy to Chew; Liquid level: Level 0 - Thin; Second Modifier: (optional): Consistent CHO (Diabetic); Tray Modifications (optional): No Straws     Standing Status:   Standing     Number of Occurrences:   1     Order Specific Question:   Diet:     Answer:   Level 7 - Easy to Chew [22]     Order Specific Question:   Liquid level     Answer:   Level 0 - Thin     Order Specific Question:   Second Modifier: (optional)     Answer:   Consistent CHO (Diabetic) [4]     Order Specific Question:   Tray Modifications (optional)     Answer:   No Straws       Assessment:  Active Hospital Problems    Diagnosis   • *Traumatic subdural hemorrhage (HCC)   • Oropharyngeal dysphagia   • Injury of vertebral artery, right, initial encounter   • C6 cervical fracture (HCC)   • Syncope and collapse   • DM2 (diabetes mellitus, type 2) ([facility])   • Traumatic closed nondisplaced fracture of proximal end of left humerus, initial encounter   • Dyslipidemia   • Neurogenic bladder   • Neurogenic bowel   • Neuropathic pain   • Acute pain due to trauma       Medical Decision Making and Plan:  TBI: Subdural hematoma, mechanical fall, no fractures  -Nonsurgical management  -Limit Haldol or benzodiazepines as these been shown to alter CNS recovery  -Keppra 500 mg twice daily for posttraumatic seizure prophylaxis, total of 7 days, no seizure activity reported, DC Keppra as no episodes of seizure  -No need for neuro stimulants at this time  -Discussed with the wife patient should not be driving until cleared by 's evaluation, this can be ordered by Dr. 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 Alvarez.  Left upper extremity weakness and paresthesias.  -Dexamethasone 4 mg IV every 6 hours x4 doses, transitioned to p.o.  on 11/11.  Decreased dose to 4 mg every 12 hours on 11/12 -> 2mg q12hr, plan to DC prior to discharge.   -Continue comprehensive acute inpatient rehabilitation     Agitation: Impulsive behavior, pleasantly confused  -No need for propanolol during acute rehabilitation, significant improvement in use of call light and insight.    COPD: will f/u with family on his home inhalers  -Consult respiratory therapy  -Supplemental oxygen as per guidelines  -Anoro Ellipta 1 puff every day  -Flovent 88 mcg every 12 hours    RACQUEL: f/u with CPAP settings  -CPAP on at night, home settings     Diabetes: Type II, With hyperglycemia, worsened by dexamethasone  -Januvia 100 mg daily  -Metformin ER 1000 mg every afternoon with meal  -Sliding scale insulin  -FSBG before every meal and nightly     Neurogenic bladder: Monitor PVRs  -Successful voiding trial with minimal PVRs, less than 50 cc, DC voiding trial    Neurogenic bowel:  -Upper motor neuron neurogenic bowel program with Colace 200 mg twice daily, senna 2 tablets q. noon Dulcolax  -KUB 11/11 shows no significant stool load, moderately hard stool present in the descending colon.     Potential for orthostatic hypotension: With cervical spine injury patient is at high risk for orthostatic hypotension  -Midodrine 5 mg every 4 hours as needed systolic blood pressure less than 100     Dysphagia  Following an anterior approach for cervical spinal fusion, dysphasia is very common. Plan to have the speech-language pathology team continue to evaluate the patient and make recommendations for the proper diet as the patient continues to progress.    Pain  #Neuropathic pain:  -Gabapentin 900 mg 3 times a day     #Acute pain, posttraumatic  -Tylenol 1000 mg 3 times daily  -Oxycodone 5-10 mg every 3 hours as needed pain  -Lidocaine patch either side of posterior neck, on for 12 hours off for 12 hours  -DC Robaxin, if pain and shoulders continues will consider restarting Robaxin 500 mg 3 times  daily    Vitamin D deficiency: Vitamin D level of 36 on admission, goal of greater than 30  -No need for cholecalciferol supplementation at this time     DVT prophylaxis  -Lovenox 40 mg daily.  This medication may be necessary for up to 3 months depending on the functional status and clinical situation of the patient as the injury evolves.      Patient was seen for 28 minutes on unit/floor of which > 50% of time was spent on counseling and coordination of care regarding the above, including prognosis, risk reduction, benefits of treatment, and options for next stage of care including prolonged discussion with wife on plan of care, neurogenic bladder, discussion of continue BTP, spinal cord injury, discussed DC of José Luis.      Wilfredo Chacon D.O.

## 2020-11-18 LAB
COVID ORDER STATUS COVID19: NORMAL
GLUCOSE BLD-MCNC: 140 MG/DL (ref 65–99)
GLUCOSE BLD-MCNC: 155 MG/DL (ref 65–99)
GLUCOSE BLD-MCNC: 167 MG/DL (ref 65–99)
GLUCOSE BLD-MCNC: 168 MG/DL (ref 65–99)
SARS-COV-2 RNA RESP QL NAA+PROBE: DETECTED
SPECIMEN SOURCE: ABNORMAL

## 2020-11-18 PROCEDURE — A9270 NON-COVERED ITEM OR SERVICE: HCPCS | Performed by: PHYSICAL MEDICINE & REHABILITATION

## 2020-11-18 PROCEDURE — 700111 HCHG RX REV CODE 636 W/ 250 OVERRIDE (IP): Performed by: PHYSICAL MEDICINE & REHABILITATION

## 2020-11-18 PROCEDURE — 97116 GAIT TRAINING THERAPY: CPT

## 2020-11-18 PROCEDURE — 97130 THER IVNTJ EA ADDL 15 MIN: CPT

## 2020-11-18 PROCEDURE — 82962 GLUCOSE BLOOD TEST: CPT | Mod: 91

## 2020-11-18 PROCEDURE — 770010 HCHG ROOM/CARE - REHAB SEMI PRIVAT*

## 2020-11-18 PROCEDURE — U0003 INFECTIOUS AGENT DETECTION BY NUCLEIC ACID (DNA OR RNA); SEVERE ACUTE RESPIRATORY SYNDROME CORONAVIRUS 2 (SARS-COV-2) (CORONAVIRUS DISEASE [COVID-19]), AMPLIFIED PROBE TECHNIQUE, MAKING USE OF HIGH THROUGHPUT TECHNOLOGIES AS DESCRIBED BY CMS-2020-01-R: HCPCS

## 2020-11-18 PROCEDURE — 700102 HCHG RX REV CODE 250 W/ 637 OVERRIDE(OP): Performed by: PHYSICAL MEDICINE & REHABILITATION

## 2020-11-18 PROCEDURE — 99232 SBSQ HOSP IP/OBS MODERATE 35: CPT | Performed by: PHYSICAL MEDICINE & REHABILITATION

## 2020-11-18 PROCEDURE — 97110 THERAPEUTIC EXERCISES: CPT

## 2020-11-18 PROCEDURE — 97129 THER IVNTJ 1ST 15 MIN: CPT

## 2020-11-18 PROCEDURE — 92526 ORAL FUNCTION THERAPY: CPT

## 2020-11-18 PROCEDURE — 97535 SELF CARE MNGMENT TRAINING: CPT

## 2020-11-18 PROCEDURE — 700101 HCHG RX REV CODE 250: Performed by: PHYSICAL MEDICINE & REHABILITATION

## 2020-11-18 RX ADMIN — INSULIN HUMAN 2 UNITS: 100 INJECTION, SOLUTION PARENTERAL at 11:29

## 2020-11-18 RX ADMIN — FLUTICASONE PROPIONATE 88 MCG: 44 AEROSOL, METERED RESPIRATORY (INHALATION) at 20:00

## 2020-11-18 RX ADMIN — CALCIUM CARBONATE (ANTACID) CHEW TAB 500 MG 500 MG: 500 CHEW TAB at 08:07

## 2020-11-18 RX ADMIN — DEXAMETHASONE 2 MG: 1 TABLET ORAL at 08:07

## 2020-11-18 RX ADMIN — INSULIN HUMAN 2 UNITS: 100 INJECTION, SOLUTION PARENTERAL at 07:19

## 2020-11-18 RX ADMIN — ENOXAPARIN SODIUM 40 MG: 40 INJECTION SUBCUTANEOUS at 17:28

## 2020-11-18 RX ADMIN — METFORMIN HYDROCHLORIDE 1000 MG: 500 TABLET, EXTENDED RELEASE ORAL at 17:25

## 2020-11-18 RX ADMIN — SITAGLIPTIN 100 MG: 50 TABLET, FILM COATED ORAL at 08:06

## 2020-11-18 RX ADMIN — GABAPENTIN 900 MG: 300 CAPSULE ORAL at 21:00

## 2020-11-18 RX ADMIN — THERA TABS 1 TABLET: TAB at 08:07

## 2020-11-18 RX ADMIN — UMECLIDINIUM BROMIDE AND VILANTEROL TRIFENATATE 1 PUFF: 62.5; 25 POWDER RESPIRATORY (INHALATION) at 07:25

## 2020-11-18 RX ADMIN — FAMOTIDINE 20 MG: 20 TABLET, FILM COATED ORAL at 21:00

## 2020-11-18 RX ADMIN — DOCUSATE SODIUM 100 MG: 100 CAPSULE, LIQUID FILLED ORAL at 21:00

## 2020-11-18 RX ADMIN — GABAPENTIN 900 MG: 300 CAPSULE ORAL at 08:07

## 2020-11-18 RX ADMIN — MULTIPLE VITAMINS W/ MINERALS TAB 1 TABLET: TAB at 11:30

## 2020-11-18 RX ADMIN — LIDOCAINE 2 PATCH: 50 PATCH TOPICAL at 08:06

## 2020-11-18 RX ADMIN — FAMOTIDINE 20 MG: 20 TABLET, FILM COATED ORAL at 08:07

## 2020-11-18 RX ADMIN — GABAPENTIN 900 MG: 300 CAPSULE ORAL at 15:30

## 2020-11-18 RX ADMIN — INSULIN HUMAN 2 UNITS: 100 INJECTION, SOLUTION PARENTERAL at 17:26

## 2020-11-18 RX ADMIN — FLUTICASONE PROPIONATE 88 MCG: 44 AEROSOL, METERED RESPIRATORY (INHALATION) at 07:25

## 2020-11-18 RX ADMIN — LOVASTATIN 40 MG: 20 TABLET ORAL at 08:06

## 2020-11-18 ASSESSMENT — GAIT ASSESSMENTS
GAIT LEVEL OF ASSIST: MODIFIED INDEPENDENT
DEVIATION: INCREASED BASE OF SUPPORT
DISTANCE (FEET): 500

## 2020-11-18 ASSESSMENT — ACTIVITIES OF DAILY LIVING (ADL): TUB_SHOWER_TRANSFER_DESCRIPTION: GRAB BAR;INCREASED TIME

## 2020-11-18 NOTE — THERAPY
Physical Therapy   Daily Treatment     Patient Name: Isaias Ortiz  Age:  80 y.o., Sex:  male  Medical Record #: [id number]  Today's Date: 11/17/2020     Precautions  Precautions: (P) Fall Risk, Spinal / Back Precautions , Cervical Collar    Comments: (P) C-collar on at all times except for shower/meals    Subjective    Pt was sitting in w/c upon arrival and agreeable to tx     Objective       11/17/20 1231   Precautions   Precautions Fall Risk;Spinal / Back Precautions ;Cervical Collar     Comments C-collar on at all times except for shower/meals   Interdisciplinary Plan of Care Collaboration   Patient Position at End of Therapy Seated;Call Light within Reach;Tray Table within Reach;Phone within Reach   PT Total Time Spent   PT Individual Total Time Spent (Mins) 30   PT Charge Group   PT Manual Therapy 2     Strength:  B ER 3 x 15 in supine  B serratus punch [place] / TCs for technique 3 x 10 R/L UE    Mobilization with movement 60sec x 3 each R/L UE in supine:  Shoulder flexion, abduction     STM to subscap mm R/L 5min each     Provided with B heat pack to shoulders post session for comfort    Assessment    Pt continues to present with trigger points in B subscap limited motion, weak serratus anterior mm B and limited neuro control and with a tight posterior capsule B restricting motion. Pt with good tolerance to manual therapy and within session improvements in serratus anterior mm activation .    Strengths: Willingly participates in therapeutic activities, Pleasant and cooperative, Supportive family, Motivated for self care and independence, Manages pain appropriately, Independent prior level of function, Good insight into deficits/needs, Good endurance, Good carryover of learning, Effective communication skills, Alert and oriented, Adequate strength  Barriers: Impaired activity tolerance, Impaired balance, Home accessibility, Fatigue, Decreased endurance    Plan    Collect all d/c  IR[facility] to prepare for d/c home  "Thursday 11/19.      Physical Therapy Problems     Problem: Mobility     Dates: Start: 11/12/20       Goal: STG-Within one week, patient will ambulate up/down flight of stairs     Dates: Start: 11/12/20       Description: 1) Individualized goal:  12 x 1 6\" steps mod I  2) Interventions:  PT Gait Training, PT Therapeutic Exercises, PT Neuro Re-Ed/Balance, PT Therapeutic Activity, PT Manual Therapy, and PT Evaluation      Note:     Goal Note filed on 11/16/20 0820 by Shaniqua Ray, PT    Pt limited by standing balance at this time                        Problem: PT-Long Term Goals     Dates: Start: 11/12/20       Goal: LTG-By discharge, patient will ambulate     Dates: Start: 11/12/20       Description: 1) Individualized goal: 400ft x 1 mod I no AD  2) Interventions:  PT Gait Training, PT Therapeutic Exercises, PT Neuro Re-Ed/Balance, PT Therapeutic Activity, PT Manual Therapy, and PT Evaluation            Goal: LTG-By discharge, patient will perform home exercise program     Dates: Start: 11/12/20       Description: 1) Individualized goal:  for standing balance, mod I  2) Interventions:  PT Gait Training, PT Therapeutic Exercises, PT Neuro Re-Ed/Balance, PT Therapeutic Activity, PT Manual Therapy, and PT Evaluation            Goal: LTG-By discharge, patient will ambulate up/down flight of stairs     Dates: Start: 11/12/20       Description: 1) Individualized goal:  4 x 1 6\" steps no HR CGA  2) Interventions:  PT Gait Training, PT Therapeutic Exercises, PT Neuro Re-Ed/Balance, PT Therapeutic Activity, PT Manual Therapy, and PT Evaluation            Goal: LTG-By discharge, patient will transfer in/out of a car     Dates: Start: 11/12/20       Description: 1) Individualized goal:  mod I no AD  2) Interventions:  PT Gait Training, PT Therapeutic Exercises, PT Neuro Re-Ed/Balance, PT Therapeutic Activity, PT Manual Therapy, and PT Evaluation                      "

## 2020-11-18 NOTE — THERAPY
Physical Therapy   Daily Treatment     Patient Name: Isaias Ortiz  Age:  80 y.o., Sex:  male  Medical Record #: [id number]  Today's Date: 11/18/2020     Precautions  Precautions: (P) Fall Risk, Spinal / Back Precautions , Cervical Collar    Comments: (P) C-collar on at all times except for shower/meals    Subjective    Pt was sitting in w/c upon arrival and agreeable to tx  Pt feels prepared to dc home at [facility] with outpatient PT and no AD and support of spouse      Objective       11/18/20 0831   Precautions   Precautions Fall Risk;Spinal / Back Precautions ;Cervical Collar     Comments C-collar on at all times except for shower/meals   Gait Functional Level of Assist    Gait Level Of Assist Modified Independent   Assistive Device None   Distance (Feet) 500   # of Times Distance was Traveled 2   Deviation Increased Base Of Support   Wheelchair Functional Level of Assist   Wheelchair Assist Refused  (d/t amb status)   Stairs Functional Level of Assist   Level of Assist with Stairs Supervised   # of Stairs Climbed 12   Stairs Description Hand rails   Transfer Functional Level of Assist   Bed, Chair, Wheelchair Transfer Modified Independent   Bed Chair Wheelchair Transfer Description   (SPT no AD)   Bed Mobility    Supine to Sit Modified Independent   Sit to Supine Modified Independent   Sit to Stand Modified Independent   Scooting Modified Independent   Rolling Modified Independent   Interdisciplinary Plan of Care Collaboration   Patient Position at End of Therapy Seated;Call Light within Reach;Tray Table within Reach;Phone within Reach   PT Total Time Spent   PT Individual Total Time Spent (Mins) 30   PT Charge Group   PT Gait Training 2     See IRF TEMO for all collected d/c scores     Assessment  Pt feels prepared to dc home at [facility] with outpatient PT and no AD and support of spouse     Strengths: Willingly participates in therapeutic activities, Pleasant and cooperative, Supportive family, Motivated for self care and  "independence, Manages pain appropriately, Independent prior level of function, Good insight into deficits/needs, Good endurance, Good carryover of learning, Effective communication skills, Alert and oriented, Adequate strength  Barriers: Impaired activity tolerance, Impaired balance, Home accessibility, Fatigue, Decreased endurance    Plan    dc home at [facility] with outpatient PT and no AD and support of spouse     Physical Therapy Problems     Problem: Mobility     Dates: Start: 11/12/20       Goal: STG-Within one week, patient will ambulate up/down flight of stairs     Dates: Start: 11/12/20       Description: 1) Individualized goal:  12 x 1 6\" steps mod I  2) Interventions:  PT Gait Training, PT Therapeutic Exercises, PT Neuro Re-Ed/Balance, PT Therapeutic Activity, PT Manual Therapy, and PT Evaluation      Note:     Goal Note filed on 11/16/20 0820 by Shaniqua Ray, PT    Pt limited by standing balance at this time                        Problem: PT-Long Term Goals     Dates: Start: 11/12/20       Goal: LTG-By discharge, patient will ambulate     Dates: Start: 11/12/20       Description: 1) Individualized goal: 400ft x 1 mod I no AD  2) Interventions:  PT Gait Training, PT Therapeutic Exercises, PT Neuro Re-Ed/Balance, PT Therapeutic Activity, PT Manual Therapy, and PT Evaluation            Goal: LTG-By discharge, patient will perform home exercise program     Dates: Start: 11/12/20       Description: 1) Individualized goal:  for standing balance, mod I  2) Interventions:  PT Gait Training, PT Therapeutic Exercises, PT Neuro Re-Ed/Balance, PT Therapeutic Activity, PT Manual Therapy, and PT Evaluation            Goal: LTG-By discharge, patient will ambulate up/down flight of stairs     Dates: Start: 11/12/20       Description: 1) Individualized goal:  4 x 1 6\" steps no HR CGA  2) Interventions:  PT Gait Training, PT Therapeutic Exercises, PT Neuro Re-Ed/Balance, PT Therapeutic Activity, PT Manual Therapy, and PT " Evaluation            Goal: LTG-By discharge, patient will transfer in/out of a car     Dates: Start: 11/12/20       Description: 1) Individualized goal:  mod I no AD  2) Interventions:  PT Gait Training, PT Therapeutic Exercises, PT Neuro Re-Ed/Balance, PT Therapeutic Activity, PT Manual Therapy, and PT Evaluation

## 2020-11-18 NOTE — CARE PLAN
"  Problem: PT-Long Term Goals  Goal: LTG-By discharge, patient will ambulate  Description: 1) Individualized goal: 400ft x 1 mod I no AD  2) Interventions:  PT Gait Training, PT Therapeutic Exercises, PT Neuro Re-Ed/Balance, PT Therapeutic Activity, PT Manual Therapy, and PT Evaluation    Outcome: MET  Goal: LTG-By discharge, patient will perform home exercise program  Description: 1) Individualized goal:  for standing balance, mod I  2) Interventions:  PT Gait Training, PT Therapeutic Exercises, PT Neuro Re-Ed/Balance, PT Therapeutic Activity, PT Manual Therapy, and PT Evaluation    Outcome: MET  Goal: LTG-By discharge, patient will ambulate up/down flight of stairs  Description: 1) Individualized goal:  4 x 1 6\" steps no HR CGA  2) Interventions:  PT Gait Training, PT Therapeutic Exercises, PT Neuro Re-Ed/Balance, PT Therapeutic Activity, PT Manual Therapy, and PT Evaluation    Outcome: MET  Goal: LTG-By discharge, patient will transfer in/out of a car  Description: 1) Individualized goal:  SPV no AD  2) Interventions:  PT Gait Training, PT Therapeutic Exercises, PT Neuro Re-Ed/Balance, PT Therapeutic Activity, PT Manual Therapy, and PT Evaluation    Outcome: MET     "

## 2020-11-18 NOTE — THERAPY
Speech Language Pathology  Daily Treatment     Patient Name: Isaias Ortiz  Age:  80 y.o., Sex:  male  Medical Record #: [id number]  Today's Date: 11/18/2020     Precautions  Precautions: Fall Risk, Spinal / Back Precautions , Cervical Collar    Comments: C-collar on at all times except for shower/meals    Subjective    Patient was willing to participate. Was seen for ST at 0930 ans 1130.     Objective       11/18/20 0932   Dysphagia    Diet / Liquid Recommendation Regular - Easy to Chew (7);Thin (0)   SCCAN (Scales of Cognitive and Communicative Ability for Neurorehabilitation)   Oral Expression - Raw Score 19   Oral Expression - Scale Performance Score 100   Orientation - Raw Score 12   Orientation - Scale Performance Score 100   Memory - Raw Score 9   Memory - Scale Performance Score 47   Speech Comprehension - Raw Score 12   Speech Comprehension - Scale Performance Score 92   Reading Comprehension - Raw Score 10   Reading Comprehension - Scale Performance Score 83   Writing - Raw Score 7   Writing - Scale Performance Score 100   Attention - Raw Score 15   Attention - Scale Performance Score 94   Problem Solving - Raw Score 21   Problem Solving - Scale Performance Score 91   SCCAN Total Raw Score 80   SCCAN Degree of Severity Mild Impairment   SLP Total Time Spent   SLP Individual Total Time Spent (Mins) 90   Treatment Charges   SLP Swallowing Dysfunction Treatment Swallowing Dysfunction Treatment   SLP Cognitive Skill Development First 15 Minutes 1   SLP Cognitive Skill Development Additional 15 Minutes 3       Assessment    Patient appears to tolerate regular easy to chew diet without overt s/sx of aspiration.   Completed final outcomes assessement. Patient completed SCCAN with a raw score of 80 indicating mild deficits. Continues to demonstrate difficulties on memory subtests.     Strengths: Able to follow instructions, Alert and oriented, Effective communication skills, Good insight into deficits/needs, Independent  prior level of function, Making steady progress towards goals, Motivated for self care and independence, Pleasant and cooperative, Willingly participates in therapeutic activities  Barriers: Impaired functional cognition    Plan    Prepare for d/c home with spouse.     Speech Therapy Problems     Problem: Memory STGs     Dates: Start: 11/16/20       Goal: STG-Within one week, patient will     Dates: Start: 11/16/20       Description: 1) Individualized goal:  recall daily events/and RWAVS memory strategies with 95% accuracy independently.   2) Interventions:  SLP Cognitive Skill Development                    Problem: Problem Solving STGs     Dates: Start: 11/12/20       Goal: STG-Within one week, patient will solve complex problems     Dates: Start: 11/12/20       Description: 1) Individualized goal:  related to Medication management with 90% accuracy given SPV  2) Interventions:  SLP Speech Language Treatment, SLP Self Care / ADL Training , SLP Cognitive Skill Development, and SLP Group Treatment    Note:     Goal Note filed on 11/16/20 0759 by Irvin Reid MS,CCC-SLP    Med list to be reviewed this day.  Pt reports not using pill box, but organizes meds by day in separate bottles.                   Goal: STG-Within one week, patient will     Dates: Start: 11/16/20       Description: 1) Individualized goal:  complete executive function tasks related scheduling/reasoning 100% [place].    2) Interventions:  SLP Cognitive Skill Development                    Problem: Speech/Swallowing LTGs     Dates: Start: 11/12/20       Goal: LTG-By discharge, patient will solve complex problem     Dates: Start: 11/12/20       Description: 1) Individualized goal:  related to IADL's in order to safely d/c home with mod I  2) Interventions:  SLP Speech Language Treatment, SLP Self Care / ADL Training , SLP Cognitive Skill Development, and SLP Group Treatment

## 2020-11-18 NOTE — THERAPY
Occupational Therapy  Daily Treatment     Patient Name: Isaias Ortiz  Age:  80 y.o., Sex:  male  Medical Record #: [id number]  Today's Date: 11/18/2020     Precautions  Precautions: Fall Risk, Spinal / Back Precautions , Cervical Collar    Comments: C-collar on at all times except for shower/meals    Safety   Comments: Mod I in room for bed/toilet txfrs, no AD    Subjective    Patient agreeable to participate in OT.      Objective     11/18/20 1301   Precautions   Precautions Fall Risk;Spinal / Back Precautions ;Cervical Collar     Comments C-collar on at all times except for shower/meals   Vitals   O2 Delivery Device None - Room Air   Cognition    Level of Consciousness Alert   Functional Level of Assist   Eating Independent   Grooming Stand by Assist  (Dl for combing hair,mod I for brushing teeth/washing hands)   Bathing Supervision  (while incorporating sitting/standing w/ grab bar)   Upper Body Dressing Minimal Assist  (for donning polo shirt while seated, min A for C-collar)   Lower Body Dressing Modified Independent   Lower Body Dressing Description Grab bar;Cues for spinal precautions;Increased time;Initial preparation for task;Set-up of equipment;Supervision for safety;Verbal cueing   Toileting Modified Independent   Bed, Chair, Wheelchair Transfer Modified Independent   Toilet Transfers Modified Independent   Tub / Shower Transfers Modified Independent   Tub Shower Transfer Description Grab bar;Increased time   Interdisciplinary Plan of Care Collaboration   Patient Position at End of Therapy Seated   OT Total Time Spent   OT Individual Total Time Spent (Mins) 30   OT Charge Group   OT Self Care / ADL 2     Assessment    Patient tolerated OT session well with focus on progression with ADLs/DC IRF Wolf. Patient has made significant functional progress during rehab stay however continues to require minimal assist with UB dressing due to shoulder weakness. Met with spouse briefly in zay (she brought patient  clean clothing); she verbalized understanding re: recommendation for supervision during initial transition from hospital.   Strengths: Able to follow instructions, Alert and oriented, Effective communication skills, Good carryover of learning, Good insight into deficits/needs, Independent prior level of function, Making steady progress towards goals, Manages pain appropriately, Motivated for self care and independence, Pleasant and cooperative, Supportive family, Willingly participates in therapeutic activities  Barriers: Generalized weakness, Impaired activity tolerance, Impaired balance    Plan    Pt to DC home tomorrow with family support    Occupational Therapy Goals     Problem: OT Long Term Goals     Dates: Start: 11/12/20       Goal: LTG-By discharge, patient will complete basic self care tasks     Dates: Start: 11/12/20       Description: 1) Individualized Goal:  Mod I   2) Interventions:  OT Orthotics Training, OT Self Care/ADL, OT Manual Ther Technique, OT Neuro Re-Ed/Balance, OT Sensory Int Techniques, OT Therapeutic Activity, OT Evaluation, and OT Therapeutic Exercise      Note:     Goal Note filed on 11/18/20 0504 by Roxanna Gillespie MS,OTR/L    Partially met; Patient demo's ability to perform self-feeding, grooming and seated LB dressing tasks at mod I level. Supervision for ADLs in standing and min-set up assist required for UB dressing (due to shoulder weakness)

## 2020-11-18 NOTE — PROGRESS NOTES
"Rehab Progress Note     Encounter Date: 11/18/2020    CC: LUE weakness and TBI    Interval Events (Subjective)  He complains of cough, congestion in his chest, started yesterday, history of COPD and RACQUEL.  He is unable to tell any significant difference between his COPD and the current increase in secretions.  He has not used his rescue inhaler.    He denies any changes and burning and tingling of lower extremities, pain in shoulder area is significantly improved after immobilization from physical therapy.    He is ambulating well, feels like his balance is continuing to improve.  He understands that he cannot drive at least until removal of c-collar    Bladder: Volitional voiding, no incontinence  Bowel: Medium soft BM, no BTP, refused Colace and suppository    ROS:  Gen: No fatigue, confusion, significant weight loss  Eyes: no blurry vision, double vision or pain in eyes  ENT: no changes in hearing, runny nose, nose bleeds, sinus pain  CV: No CP, palpitations, symptoms of AUTONOMIC DYSREFLEXIA  Lungs: no shortness of breath, increased secretions, + cough, no pain with coughing  Abd: No abd pain, nausea, vomiting, pain with eating  : no blood in urine, pain during storage phase, bladder spasms, suprapubic pain  Ext: No swelling in legs, asymmetric atrophy  Neuro: no changes in strength or sensation  Skin: no new ulcers/skin breakdown appreciated by patient  Mood: No changes in mood today, increase in depression or anxiety  Heme: no bruising, or bleeding  Rest of 14 point review of systems is negative      Objective:  Vitals: /76   Pulse 86   Temp 36.1 °C (96.9 °F) (Temporal)   Resp 18   Ht 1.753 m (5' 9.02\")   Wt 99.3 kg (219 lb)   SpO2 93%   Gen: NAD, Body mass index is 32.33 kg/m².  HEENT:  NC/AT, PERRLA, moist mucous membranes, Aspen collar in place  Cardio: RRR, no mumurs  Pulm: CTAB, with normal respiratory effort  Abd: Soft NTND, active bowel sounds,   Ext: No peripheral edema. No calf " tenderness. No clubbing/cyanosis. +dorsal pedalis pulses bilaterally.    Left shoulder: Improve passive and active range of motion of the left shoulder                              Upper Extremity  Myotome R L   Shoulder flexion C5 5 4   Elbow flexion C5 5 5   Wrist extension C6 4 4   Elbow extension C7 5 4   Finger flexion C8 5 5   Finger abduction T1 5 5          Recent Results (from the past 72 hour(s))   ACCU-CHEK GLUCOSE    Collection Time: 11/15/20 11:34 AM   Result Value Ref Range    Glucose - Accu-Ck 156 (H) 65 - 99 mg/dL   ACCU-CHEK GLUCOSE    Collection Time: 11/15/20  5:30 PM   Result Value Ref Range    Glucose - Accu-Ck 173 (H) 65 - 99 mg/dL   ACCU-CHEK GLUCOSE    Collection Time: 11/15/20  8:35 PM   Result Value Ref Range    Glucose - Accu-Ck 204 (H) 65 - 99 mg/dL   ACCU-CHEK GLUCOSE    Collection Time: 11/16/20  8:22 AM   Result Value Ref Range    Glucose - Accu-Ck 181 (H) 65 - 99 mg/dL   ACCU-CHEK GLUCOSE    Collection Time: 11/16/20 11:31 AM   Result Value Ref Range    Glucose - Accu-Ck 156 (H) 65 - 99 mg/dL   ACCU-CHEK GLUCOSE    Collection Time: 11/16/20  5:21 PM   Result Value Ref Range    Glucose - Accu-Ck 192 (H) 65 - 99 mg/dL   ACCU-CHEK GLUCOSE    Collection Time: 11/16/20  8:19 PM   Result Value Ref Range    Glucose - Accu-Ck 222 (H) 65 - 99 mg/dL   ACCU-CHEK GLUCOSE    Collection Time: 11/17/20  7:39 AM   Result Value Ref Range    Glucose - Accu-Ck 152 (H) 65 - 99 mg/dL   ACCU-CHEK GLUCOSE    Collection Time: 11/17/20 11:22 AM   Result Value Ref Range    Glucose - Accu-Ck 192 (H) 65 - 99 mg/dL   ACCU-CHEK GLUCOSE    Collection Time: 11/17/20  5:12 PM   Result Value Ref Range    Glucose - Accu-Ck 126 (H) 65 - 99 mg/dL   ACCU-CHEK GLUCOSE    Collection Time: 11/17/20  8:44 PM   Result Value Ref Range    Glucose - Accu-Ck 176 (H) 65 - 99 mg/dL   ACCU-CHEK GLUCOSE    Collection Time: 11/18/20  7:18 AM   Result Value Ref Range    Glucose - Accu-Ck 167 (H) 65 - 99 mg/dL       Current  Facility-Administered Medications   Medication Frequency   • docusate sodium (COLACE) capsule 100 mg BID    And   • sennosides (SENOKOT) 8.6 MG tablet 17.2 mg DAILY AT NOON    And   • bisacodyl (THE MAGIC BULLET) suppository 10 mg QDAY    And   • magnesium hydroxide (MILK OF MAGNESIA) suspension 30 mL QDAY PRN   • dexamethasone (DECADRON) tablet 2 mg Q12HRS   • gabapentin (NEURONTIN) capsule 900 mg TID   • methocarbamol (ROBAXIN) tablet 500 mg Q4HRS PRN   • influenza Vac High-Dose Quad (Fluzone) injection 0.7 mL Once PRN   • umeclidinium-vilanterol (ANORO ELLIPTA) inhaler 1 Puff Daily-0800    And   • fluticasone (FLOVENT HFA) 44 MCG/ACT inhaler 88 mcg BID   • calcium carbonate (TUMS) chewable tab 500 mg QAM   • enoxaparin (LOVENOX) inj 40 mg DAILY AT 1800   • famotidine (PEPCID) tablet 20 mg BID   • lovastatin (MEVACOR) tablet 40 mg DAILY   • metFORMIN ER (GLUCOPHAGE XR) tablet 1,000 mg PM MEAL   • multivitamin (THERAGRAN) tablet 1 Tab QAM   • SITagliptin (JANUVIA) tablet 100 mg DAILY   • albuterol inhaler 2 Puff Q6HRS PRN   • Pharmacy Consult Request ...Pain Management Review 1 Each PHARMACY TO DOSE   • Respiratory Therapy Consult Continuous RT   • oxyCODONE immediate-release (ROXICODONE) tablet 5 mg Q3HRS PRN   • oxyCODONE immediate release (ROXICODONE) tablet 10 mg Q3HRS PRN   • acetaminophen (Tylenol) tablet 650 mg Q4HRS PRN   • insulin regular (HumuLIN R,NovoLIN R) injection 4X/DAY ACHS    And   • glucose 4 g chewable tablet 16 g Q15 MIN PRN    And   • dextrose 50% (D50W) injection 50 mL Q15 MIN PRN   • therapeutic multivitamin-minerals (THERAGRAN-M) tablet 1 Tab DAILY WITH LUNCH   • artificial tears ophthalmic solution 1 Drop PRN   • benzocaine-menthol (CEPACOL) lozenge 1 Lozenge Q2HRS PRN   • mag hydrox-al hydrox-simeth (MAALOX PLUS ES or MYLANTA DS) suspension 20 mL Q2HRS PRN   • ondansetron (ZOFRAN ODT) dispertab 4 mg 4X/DAY PRN    Or   • ondansetron (ZOFRAN) syringe/vial injection 4 mg 4X/DAY PRN   •  traZODone (DESYREL) tablet 50 mg QHS PRN   • sodium chloride (OCEAN) 0.65 % nasal spray 2 Spray PRN   • midazolam (VERSED) 5 mg/mL (1 mL vial) PRN   • lidocaine (LIDODERM) 5 % 2 Patch Q24HR   • midodrine (PROAMATINE) tablet 10 mg Q4HRS PRN       Orders Placed This Encounter   Procedures   • Diet Order Diet: Level 7 - Easy to Chew; Liquid level: Level 0 - Thin; Second Modifier: (optional): Consistent CHO (Diabetic); Tray Modifications (optional): No Straws     Standing Status:   Standing     Number of Occurrences:   1     Order Specific Question:   Diet:     Answer:   Level 7 - Easy to Chew [22]     Order Specific Question:   Liquid level     Answer:   Level 0 - Thin     Order Specific Question:   Second Modifier: (optional)     Answer:   Consistent CHO (Diabetic) [4]     Order Specific Question:   Tray Modifications (optional)     Answer:   No Straws       Assessment:  Active Hospital Problems    Diagnosis   • *Traumatic subdural hemorrhage (HCC)   • Oropharyngeal dysphagia   • Injury of vertebral artery, right, initial encounter   • C6 cervical fracture (HCC)   • Syncope and collapse   • DM2 (diabetes mellitus, type 2) (HCC)   • Traumatic closed nondisplaced fracture of proximal end of left humerus, initial encounter   • Dyslipidemia   • Neurogenic bladder   • Neurogenic bowel   • Neuropathic pain   • Acute pain due to trauma       Medical Decision Making and Plan:  TBI: Subdural hematoma, mechanical fall, no fractures  -Nonsurgical management  -Limit Haldol or benzodiazepines as these been shown to alter CNS recovery  -Keppra 500 mg twice daily for posttraumatic seizure prophylaxis, total of 7 days, no seizure activity reported, DC Keppra as no episodes of seizure  -No need for neuro stimulants at this time  -Discussed with the wife patient should not be driving until cleared by 's evaluation, this can be ordered by Dr. 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 Alvarez.  Left upper extremity weakness and paresthesias.  -Dexamethasone 4 mg IV every 6 hours x4 doses, transitioned to p.o. on 11/11.  Decreased dose to 4 mg every 12 hours on 11/12 -> 2mg q12hr, discontinue on 11/18.  Monitor for changes and strength or sensation  -Continue comprehensive acute inpatient rehabilitation     Agitation: Impulsive behavior, pleasantly confused  -No need for propanolol during acute rehabilitation, significant improvement in use of call light and insight.    Cough: History of COPD, management as below  -Given and high risk of Covid infection will check Covid test  -Place patient on droplet and eye and contact precautions  -Check chest x-ray    COPD: will f/u with family on his home inhalers  -Consult respiratory therapy  -Supplemental oxygen as per guidelines  -Anoro Ellipta 1 puff every day  -Flovent 88 mcg every 12 hours    RACQUEL: f/u with CPAP settings  -CPAP on at night, home settings     Diabetes: Type II, With hyperglycemia, worsened by dexamethasone  -Januvia 100 mg daily  -Metformin ER 1000 mg every afternoon with meal  -Sliding scale insulin  -FSBG before every meal and nightly     Neurogenic bladder: Monitor PVRs  -Successful voiding trial with minimal PVRs, less than 50 cc, DC voiding trial    Neurogenic bowel:  -Upper motor neuron neurogenic bowel program with Colace 200 mg twice daily, senna 2 tablets q. noon Dulcolax  -KUB 11/11 shows no significant stool load, moderately hard stool present in the descending colon.     Potential for orthostatic hypotension: With cervical spine injury patient is at high risk for orthostatic hypotension  -Midodrine 5 mg every 4 hours as needed systolic blood pressure less than 100     Dysphagia  Following an anterior approach for cervical spinal fusion, dysphasia is very common. Plan to have the speech-language  pathology team continue to evaluate the patient and make recommendations for the proper diet as the patient continues to progress.    Pain  #Neuropathic pain:  -Gabapentin 900 mg 3 times a day     #Acute pain, posttraumatic  -DC Tylenol 1000 mg 3 times daily, can use this as needed, educated not to take more than 4 gm per day  -Oxycodone 5-10 mg every 3 hours as needed pain  -Lidocaine patch either side of posterior neck, on for 12 hours off for 12 hours  -DC Robaxin, if pain and shoulders continues will consider restarting Robaxin 500 mg 3 times daily    Vitamin D deficiency: Vitamin D level of 36 on admission, goal of greater than 30  -No need for cholecalciferol supplementation at this time     DVT prophylaxis  -Lovenox 40 mg daily.  This medication may be necessary for up to 3 months depending on the functional status and clinical situation of the patient as the injury evolves.      Patient was seen for 28 minutes on unit/floor of which > 50% of time was spent on counseling and coordination of care regarding the above, including prognosis, risk reduction, benefits of treatment, and options for next stage of care including cough, COPD, check chest x-ray, check Covid test, place patient on isolation discussed with staff.      Wilfredo Chacon D.O.

## 2020-11-18 NOTE — CARE PLAN
Problem: Communication  Goal: The ability to communicate needs accurately and effectively will improve  Outcome: PROGRESSING AS EXPECTED  Note: Pt is able to communicate his needs effectively to staff.      Problem: Safety  Goal: Will remain free from injury  Outcome: PROGRESSING AS EXPECTED  Note: Pt is Mod I in his room     Problem: Venous Thromboembolism (VTW)/Deep Vein Thrombosis (DVT) Prevention:  Goal: Patient will participate in Venous Thrombosis (VTE)/Deep Vein Thrombosis (DVT)Prevention Measures  Outcome: PROGRESSING AS EXPECTED  Note: Pt receives Lovenox SC injections for DVT prophylaxis

## 2020-11-18 NOTE — THERAPY
"Occupational Therapy  Daily Treatment     Patient Name: Isaias Ortiz  Age:  80 y.o., Sex:  male  Medical Record #: [id number]  Today's Date: 11/18/2020     Precautions  Precautions: (P) Fall Risk, Spinal / Back Precautions , Cervical Collar    Comments: (P) C-collar on at all times except for shower/meals    Safety   Comments: Mod I in room for bed/toilet txfrs, no AD    Subjective  Pt was seated in w/c and agreeable therapy  \"I am tired, can we just do the exercises?\" Re: doffing/donning shirt overhead      Objective       11/18/20 1501   Precautions   Precautions Fall Risk;Spinal / Back Precautions ;Cervical Collar     Comments C-collar on at all times except for shower/meals   Functional Level of Assist   Upper Body Dressing Minimal Assist   Upper Body Dressing Description   (Min A for facial mask. Attempted doffing shirt )   Sitting Upper Body Exercises   Front Arm Raise 1 set of 10;Other Resistance (See Comments)  (against gravity with elbow flex/ext at 90)   Shoulder Extension 1 set of 10;Right ;Left;Medium Resistance Theraband   External Shoulder Rotation 1 set of 10;Right ;Left;Medium Resistance Theraband   Scapular Depression 1 set of 10;Medium Resistance Theraband;Right ;Left   Bilateral Row 1 set of 10;Right ;Left;Medium Resistance Theraband   Bicep Curls 1 set of 10;Right ;Left;Medium Resistance Theraband   Tricep Press 1 set of 10;Right ;Left;Medium Resistance Theraband   Interdisciplinary Plan of Care Collaboration   Patient Position at End of Therapy Seated   OT Total Time Spent   OT Individual Total Time Spent (Mins) 30   OT Charge Group   OT Therapeutic Exercise  2   Education: B UE HEP as listed above including seated position    Assessment    Pt participated in therapeutic exercise focusing on B UE strength without complaint of fatigue, demonstrating understanding of HEP.     Strengths: Able to follow instructions, Alert and oriented, Effective communication skills, Good carryover of learning, Good " insight into deficits/needs, Independent prior level of function, Making steady progress towards goals, Manages pain appropriately, Motivated for self care and independence, Pleasant and cooperative, Supportive family, Willingly participates in therapeutic activities  Barriers: Generalized weakness, Impaired activity tolerance, Impaired balance    Plan    D/C tomorrow     Occupational Therapy Goals     Problem: OT Long Term Goals     Dates: Start: 11/12/20       Goal: LTG-By discharge, patient will complete basic self care tasks     Dates: Start: 11/12/20       Description: 1) Individualized Goal:  Mod I   2) Interventions:  OT Orthotics Training, OT Self Care/ADL, OT Manual Ther Technique, OT Neuro Re-Ed/Balance, OT Sensory Int Techniques, OT Therapeutic Activity, OT Evaluation, and OT Therapeutic Exercise      Note:     Goal Note filed on 11/18/20 0674 by Roxanna Gillespie MS,OTR/L    Partially met; Patient demo's ability to perform self-feeding, grooming and seated LB dressing tasks at mod I level. Supervision for ADLs in standing and min-set up assist required for UB dressing (due to shoulder weakness)

## 2020-11-18 NOTE — DISCHARGE INSTRUCTIONS
Physical Therapy Discharge Instructions for Isaias Ortiz    11/18/2020    Level of Assist Required for Ambulation: No Assist on Flat Surfaces, Supervision on Curbs, Supervision on Stairs  Distance Patient May Ambulate: (limited community distances)  Device Recommended for Ambulation: None  Level of Assist Required for Transfers: Requires No Assist  Device Recommended for Transfers: None  Home Exercise Program: Refer to Home Exercise Program Handout for Details  It was a pleasure working with you! Best of luck on your continued recovery.  Sincerely,  Shaniqua Ray PT, DPT    Speech Therapy Discharge Instructions for Isaias Ortiz    11/18/2020    Diet: Regular - Easy to Chew (7), Thin (0)    Strategies to Help Improve Your Memory   Your speech therapist can work with you to develop strategies to help you remember new information. There are 2 main types of strategies to help your memory: internal reminders and external reminders.     Internal Reminders     •Rehearsal: retelling yourself information you just learned, or restating it out loud in your own words.   •Repetition: saying the same information over and over, either silently or out loud.   •Clarification: asking someone else to repeat or rephrase information.   •Chunking: grouping items together to reduce the number of items to remember, such as grouping 7-digit phone numbers into 2 chunks, one with 3 numbers and the other with 4 numbers.   •Rhyming: making a rhyme out of important information.   •Acronyms or alphabet cueing: creating a letter for each word you want to remember, or vice versa. One example is using the sentence “Every Good Boy Does Fine” to remember that the lines of a treble staff in music are the notes E, G, B, D, and F.   •Imagery (also called visualization): creating pictures of the information in your mind.   •Association: linking old information or habits with the new, such as remembering to take your medicine every night at the same time that  you brush your teeth.   •Personal meaning: making the new information meaningful or emotionally important to you in some way.     External Reminders     •Using a paper or electronic calendar or day planner.   •Setting timers or alarms to remind you to do something.   •Writing down reminders such as to-do lists, shopping lists, and project outlines.   •Recording new information with a voice recorder.   •Using a medicine organizing tool.   •Creating specific, permanent places for important items. One example is always putting your keys, wallet, and cell phone in the same place every time you get home.    It was great working with you Serge! Ade Das, SLP

## 2020-11-18 NOTE — CARE PLAN
Problem: OT Long Term Goals  Goal: LTG-By discharge, patient will complete basic self care tasks  Description: 1) Individualized Goal:  Mod I   2) Interventions:  OT Orthotics Training, OT Self Care/ADL, OT Manual Ther Technique, OT Neuro Re-Ed/Balance, OT Sensory Int Techniques, OT Therapeutic Activity, OT Evaluation, and OT Therapeutic Exercise    Outcome: NOT MET  Note: Partially met; Patient demo's ability to perform self-feeding, grooming and seated LB dressing tasks at mod I level. Supervision for ADLs in standing and min-set up assist required for UB dressing (due to shoulder weakness)       Problem: Bathing  Goal: STG-Within one week, patient will bathe  Description: 1) Individualized Goal:  SBA   2) Interventions:  OT Orthotics Training, OT Self Care/ADL, OT Manual Ther Technique, OT Neuro Re-Ed/Balance, OT Sensory Int Techniques, OT Therapeutic Activity, OT Evaluation, and OT Therapeutic Exercise    Outcome: MET     Problem: IADL's  Goal: STG-Within one week, patient will prepare a meal  Description: 1) Individualized Goal:  SBA while standing     2) Interventions:  OT Orthotics Training, OT Self Care/ADL, OT Manual Ther Technique, OT Neuro Re-Ed/Balance, OT Sensory Int Techniques, OT Therapeutic Activity, OT Evaluation, and OT Therapeutic Exercise    Outcome: MET     Problem: OT Long Term Goals  Goal: LTG-By discharge, patient will perform bathroom transfers  Description: 1) Individualized Goal:  Mod I    2) Interventions:  OT Orthotics Training, OT Self Care/ADL, OT Manual Ther Technique, OT Neuro Re-Ed/Balance, OT Sensory Int Techniques, OT Therapeutic Activity, OT Evaluation, and OT Therapeutic Exercise    Outcome: MET

## 2020-11-19 ENCOUNTER — HOSPITAL ENCOUNTER (INPATIENT)
Facility: MEDICAL CENTER | Age: 80
LOS: 2 days | DRG: 177 | End: 2020-11-23
Attending: EMERGENCY MEDICINE | Admitting: STUDENT IN AN ORGANIZED HEALTH CARE EDUCATION/TRAINING PROGRAM
Payer: MEDICARE

## 2020-11-19 ENCOUNTER — APPOINTMENT (OUTPATIENT)
Dept: RADIOLOGY | Facility: MEDICAL CENTER | Age: 80
DRG: 177 | End: 2020-11-19
Attending: EMERGENCY MEDICINE
Payer: MEDICARE

## 2020-11-19 VITALS
HEIGHT: 69 IN | OXYGEN SATURATION: 96 % | BODY MASS INDEX: 32.44 KG/M2 | SYSTOLIC BLOOD PRESSURE: 110 MMHG | TEMPERATURE: 98.3 F | HEART RATE: 90 BPM | DIASTOLIC BLOOD PRESSURE: 68 MMHG | RESPIRATION RATE: 19 BRPM | WEIGHT: 219 LBS

## 2020-11-19 DIAGNOSIS — R09.02 HYPOXIA: ICD-10-CM

## 2020-11-19 DIAGNOSIS — S12.500A CLOSED DISPLACED FRACTURE OF SIXTH CERVICAL VERTEBRA, UNSPECIFIED FRACTURE MORPHOLOGY, INITIAL ENCOUNTER (HCC): ICD-10-CM

## 2020-11-19 DIAGNOSIS — R42 LIGHTHEADEDNESS: ICD-10-CM

## 2020-11-19 DIAGNOSIS — U07.1 COVID-19: ICD-10-CM

## 2020-11-19 PROBLEM — E87.1 HYPONATREMIA: Status: ACTIVE | Noted: 2020-11-19

## 2020-11-19 PROBLEM — R79.89 ELEVATED TROPONIN: Status: ACTIVE | Noted: 2020-11-19

## 2020-11-19 PROBLEM — J96.01 ACUTE RESPIRATORY FAILURE WITH HYPOXIA (HCC): Status: ACTIVE | Noted: 2020-11-19

## 2020-11-19 LAB
ALBUMIN SERPL BCP-MCNC: 3.5 G/DL (ref 3.2–4.9)
ALBUMIN/GLOB SERPL: 1.3 G/DL
ALP SERPL-CCNC: 52 U/L (ref 30–99)
ALT SERPL-CCNC: 27 U/L (ref 2–50)
ANION GAP SERPL CALC-SCNC: 10 MMOL/L (ref 7–16)
AST SERPL-CCNC: 19 U/L (ref 12–45)
BASOPHILS # BLD AUTO: 0 % (ref 0–1.8)
BASOPHILS # BLD: 0 K/UL (ref 0–0.12)
BILIRUB SERPL-MCNC: 0.5 MG/DL (ref 0.1–1.5)
BUN SERPL-MCNC: 23 MG/DL (ref 8–22)
CALCIUM SERPL-MCNC: 8.4 MG/DL (ref 8.5–10.5)
CHLORIDE SERPL-SCNC: 94 MMOL/L (ref 96–112)
CO2 SERPL-SCNC: 24 MMOL/L (ref 20–33)
CREAT SERPL-MCNC: 0.89 MG/DL (ref 0.5–1.4)
EKG IMPRESSION: NORMAL
EOSINOPHIL # BLD AUTO: 0 K/UL (ref 0–0.51)
EOSINOPHIL NFR BLD: 0 % (ref 0–6.9)
ERYTHROCYTE [DISTWIDTH] IN BLOOD BY AUTOMATED COUNT: 47.8 FL (ref 35.9–50)
GLOBULIN SER CALC-MCNC: 2.7 G/DL (ref 1.9–3.5)
GLUCOSE BLD-MCNC: 208 MG/DL (ref 65–99)
GLUCOSE SERPL-MCNC: 204 MG/DL (ref 65–99)
HCT VFR BLD AUTO: 37.1 % (ref 42–52)
HGB BLD-MCNC: 11.9 G/DL (ref 14–18)
LYMPHOCYTES # BLD AUTO: 0.3 K/UL (ref 1–4.8)
LYMPHOCYTES NFR BLD: 2.6 % (ref 22–41)
MANUAL DIFF BLD: NORMAL
MCH RBC QN AUTO: 28.7 PG (ref 27–33)
MCHC RBC AUTO-ENTMCNC: 32.1 G/DL (ref 33.7–35.3)
MCV RBC AUTO: 89.6 FL (ref 81.4–97.8)
MONOCYTES # BLD AUTO: 0.59 K/UL (ref 0–0.85)
MONOCYTES NFR BLD AUTO: 5.2 % (ref 0–13.4)
MORPHOLOGY BLD-IMP: NORMAL
NEUTROPHILS # BLD AUTO: 10.51 K/UL (ref 1.82–7.42)
NEUTROPHILS NFR BLD: 92.2 % (ref 44–72)
NRBC # BLD AUTO: 0 K/UL
NRBC BLD-RTO: 0 /100 WBC
PLATELET # BLD AUTO: 230 K/UL (ref 164–446)
PLATELET BLD QL SMEAR: NORMAL
PMV BLD AUTO: 10.2 FL (ref 9–12.9)
POTASSIUM SERPL-SCNC: 4.2 MMOL/L (ref 3.6–5.5)
PROCALCITONIN SERPL-MCNC: <0.05 NG/ML
PROT SERPL-MCNC: 6.2 G/DL (ref 6–8.2)
RBC # BLD AUTO: 4.14 M/UL (ref 4.7–6.1)
RBC BLD AUTO: NORMAL
SODIUM SERPL-SCNC: 128 MMOL/L (ref 135–145)
TROPONIN T SERPL-MCNC: 23 NG/L (ref 6–19)
TROPONIN T SERPL-MCNC: 24 NG/L (ref 6–19)
WBC # BLD AUTO: 11.4 K/UL (ref 4.8–10.8)

## 2020-11-19 PROCEDURE — 93005 ELECTROCARDIOGRAM TRACING: CPT | Performed by: EMERGENCY MEDICINE

## 2020-11-19 PROCEDURE — 700102 HCHG RX REV CODE 250 W/ 637 OVERRIDE(OP): Performed by: PHYSICAL MEDICINE & REHABILITATION

## 2020-11-19 PROCEDURE — 84484 ASSAY OF TROPONIN QUANT: CPT | Mod: 91

## 2020-11-19 PROCEDURE — 82962 GLUCOSE BLOOD TEST: CPT

## 2020-11-19 PROCEDURE — G0378 HOSPITAL OBSERVATION PER HR: HCPCS

## 2020-11-19 PROCEDURE — 99218 PR INITIAL OBSERVATION CARE,LEVL I: CPT | Performed by: STUDENT IN AN ORGANIZED HEALTH CARE EDUCATION/TRAINING PROGRAM

## 2020-11-19 PROCEDURE — 84145 PROCALCITONIN (PCT): CPT

## 2020-11-19 PROCEDURE — A9270 NON-COVERED ITEM OR SERVICE: HCPCS | Performed by: PHYSICAL MEDICINE & REHABILITATION

## 2020-11-19 PROCEDURE — 85007 BL SMEAR W/DIFF WBC COUNT: CPT

## 2020-11-19 PROCEDURE — 99239 HOSP IP/OBS DSCHRG MGMT >30: CPT | Performed by: PHYSICAL MEDICINE & REHABILITATION

## 2020-11-19 PROCEDURE — 99285 EMERGENCY DEPT VISIT HI MDM: CPT

## 2020-11-19 PROCEDURE — 80053 COMPREHEN METABOLIC PANEL: CPT

## 2020-11-19 PROCEDURE — 93005 ELECTROCARDIOGRAM TRACING: CPT

## 2020-11-19 PROCEDURE — 93970 EXTREMITY STUDY: CPT

## 2020-11-19 PROCEDURE — 94760 N-INVAS EAR/PLS OXIMETRY 1: CPT

## 2020-11-19 PROCEDURE — 36415 COLL VENOUS BLD VENIPUNCTURE: CPT

## 2020-11-19 PROCEDURE — 94660 CPAP INITIATION&MGMT: CPT

## 2020-11-19 PROCEDURE — 70450 CT HEAD/BRAIN W/O DYE: CPT

## 2020-11-19 PROCEDURE — 85027 COMPLETE CBC AUTOMATED: CPT

## 2020-11-19 RX ORDER — METHOCARBAMOL 500 MG/1
1000 TABLET, FILM COATED ORAL EVERY 4 HOURS PRN
Status: ON HOLD | COMMUNITY
End: 2020-11-22

## 2020-11-19 RX ORDER — ACETAMINOPHEN 325 MG/1
650 TABLET ORAL EVERY 4 HOURS PRN
Status: ON HOLD | COMMUNITY
End: 2020-11-22

## 2020-11-19 RX ORDER — NICOTINE POLACRILEX 2 MG
1 LOZENGE BUCCAL DAILY
Status: ON HOLD | COMMUNITY
End: 2020-11-22 | Stop reason: SDUPTHER

## 2020-11-19 RX ORDER — ALBUTEROL SULFATE 90 UG/1
2 AEROSOL, METERED RESPIRATORY (INHALATION)
Status: DISCONTINUED | OUTPATIENT
Start: 2020-11-19 | End: 2020-11-20

## 2020-11-19 RX ORDER — ONDANSETRON 4 MG/1
4 TABLET, ORALLY DISINTEGRATING ORAL EVERY 6 HOURS PRN
Status: DISCONTINUED | OUTPATIENT
Start: 2020-11-19 | End: 2020-11-23 | Stop reason: HOSPADM

## 2020-11-19 RX ORDER — FLUTICASONE PROPIONATE 44 UG/1
2 AEROSOL, METERED RESPIRATORY (INHALATION) 2 TIMES DAILY
Status: ON HOLD | COMMUNITY
End: 2020-11-22 | Stop reason: SDUPTHER

## 2020-11-19 RX ORDER — LIDOCAINE 50 MG/G
2 PATCH TOPICAL EVERY 24 HOURS
Status: ON HOLD | COMMUNITY
End: 2020-11-22 | Stop reason: SDUPTHER

## 2020-11-19 RX ORDER — ONDANSETRON 2 MG/ML
4 INJECTION INTRAMUSCULAR; INTRAVENOUS EVERY 6 HOURS PRN
Status: DISCONTINUED | OUTPATIENT
Start: 2020-11-19 | End: 2020-11-23 | Stop reason: HOSPADM

## 2020-11-19 RX ORDER — ACETAMINOPHEN 325 MG/1
650 TABLET ORAL EVERY 6 HOURS PRN
Status: DISCONTINUED | OUTPATIENT
Start: 2020-11-19 | End: 2020-11-23 | Stop reason: HOSPADM

## 2020-11-19 RX ADMIN — ALBUTEROL SULFATE 2 PUFF: 90 AEROSOL, METERED RESPIRATORY (INHALATION) at 02:02

## 2020-11-19 RX ADMIN — ACETAMINOPHEN 650 MG: 325 TABLET, FILM COATED ORAL at 02:01

## 2020-11-19 RX ADMIN — METHOCARBAMOL TABLETS 500 MG: 500 TABLET, COATED ORAL at 02:02

## 2020-11-19 ASSESSMENT — ENCOUNTER SYMPTOMS
VOMITING: 0
DEPRESSION: 0
EYE DISCHARGE: 0
SPUTUM PRODUCTION: 0
NECK PAIN: 0
ABDOMINAL PAIN: 0
DIAPHORESIS: 0
NERVOUS/ANXIOUS: 0
CHILLS: 0
NAUSEA: 0
FEVER: 0
WHEEZING: 0
HEADACHES: 0
WEAKNESS: 0
PALPITATIONS: 0
MYALGIAS: 0
SINUS PAIN: 0
SORE THROAT: 0
DIZZINESS: 1
SHORTNESS OF BREATH: 0
EYE REDNESS: 0
COUGH: 0

## 2020-11-19 ASSESSMENT — COPD QUESTIONNAIRES
COPD SCREENING SCORE: 8
DO YOU EVER COUGH UP ANY MUCUS OR PHLEGM?: YES, EVERY DAY
DURING THE PAST 4 WEEKS HOW MUCH DID YOU FEEL SHORT OF BREATH: SOME OF THE TIME
HAVE YOU SMOKED AT LEAST 100 CIGARETTES IN YOUR ENTIRE LIFE: YES

## 2020-11-19 ASSESSMENT — FIBROSIS 4 INDEX: FIB4 SCORE: 0.61

## 2020-11-19 NOTE — ED NOTES
Assumed care of patient from SHANNON Horn. Patient resting in bed, asleep. NAD. Vitals stable. Pt on oxygen. Ultrasound at bedside, waiting for CT scan. Call light in reach. Will continue to monitor.

## 2020-11-19 NOTE — FLOWSHEET NOTE
11/18/20 7425   Events/Summary/Plan   Events/Summary/Plan CPAP filled with sterile water   Mobility   Activity Performed Up to chair

## 2020-11-19 NOTE — ASSESSMENT & PLAN NOTE
Very mildly elevated at 24 and then 23.  EKG without ischemic changes.  Patient without chest pain.  Unlikely to be ACS.  Monitor clinically.

## 2020-11-19 NOTE — PROGRESS NOTES
Patient was placed on ordered CPAP. Patient's oxygen saturation decreased within 3 minutes to 86% . This writer placed patient back on oxygen via NC.

## 2020-11-19 NOTE — ED TRIAGE NOTES
Isaias Ortiz  80 y.o.  Chief Complaint   Patient presents with   • Syncope     Pt was brought in by EMS from [facility] for a syncopal episode. Pt was at rehab for a TBI when he contracted COVID at the facility and is not requiring oxygen. Pt took off oxygen and walked to the bathroom. Pt was returning from the bathroom, sat in a recliner chair and had a syncopal episode. Pt is alert and oriented on arrival

## 2020-11-19 NOTE — ED NOTES
Pharmacy Medication Reconciliation      Medication reconciliation updated and complete per [facility]  Allergies have been verified and updated   No oral ABX within the last 14 days

## 2020-11-19 NOTE — ED PROVIDER NOTES
ED Provider Note    ER PROVIDER NOTE      CHIEF COMPLAINT  Chief Complaint   Patient presents with   • Syncope       HPI  Isaias Ortiz is a 80 y.o. male who presents to the emergency department complaining of a near syncopal event.  Patient is a resident at rehab facility after recent TBI and cervical fusion.  He recently contracted Covid, and has a 3 L oxygen requirement.  He states he was sleeping he woke up took his oxygen off to go to the bathroom, and on his way back he became very lightheaded but does not think he passed all the way out.  Did not hit his head, reports no headache, chest pain, neck pain or any other focal complaint of pain.  No palpitations or shortness of breath.  No leg pain or swelling.  No nausea vomiting or abdominal pain.  He does report a slight dry cough over the last few days.  No fevers or chills    REVIEW OF SYSTEMS  Pertinent positives include lightheadedness. Pertinent negatives include no chest pain. See HPI for details. All other systems reviewed and are negative.    PAST MEDICAL HISTORY   has a past medical history of Chronic obstructive pulmonary disease (HCC), COVID-19, Diabetes (HCC), High cholesterol, Hypertension, and Prostate cancer ([facility]).    SURGICAL HISTORY   has a past surgical history that includes cyst excision; cervical disk and fusion anterior (11/5/2020); corpectomy (11/5/2020); persaud w/o facetec foramot/dskc 1/2 vrt seg, ce* (11/9/2020); and cervical fusion posterior (Bilateral, 11/9/2020).    FAMILY HISTORY  No family history on file.    SOCIAL HISTORY  Social History     Socioeconomic History   • Marital status:      Spouse name: Not on file   • Number of children: Not on file   • Years of education: Not on file   • Highest education level: Not on file   Occupational History   • Not on file   Social Needs   • Financial resource strain: Not on file   • Food insecurity     Worry: Not on file     Inability: Not on file   • Transportation needs     Medical: Not  "on file     Non-medical: Not on file   Tobacco Use   • Smoking status: Former Smoker   • Smokeless tobacco: Never Used   Substance and Sexual Activity   • Alcohol use: Yes     Frequency: 2-4 times a month   • Drug use: Never   • Sexual activity: Not on file   Lifestyle   • Physical activity     Days per week: Not on file     Minutes per session: Not on file   • Stress: Not on file   Relationships   • Social connections     Talks on phone: Not on file     Gets together: Not on file     Attends Yazidi service: Not on file     Active member of club or organization: Not on file     Attends meetings of clubs or organizations: Not on file     Relationship status: Not on file   • Intimate partner violence     Fear of current or ex partner: Not on file     Emotionally abused: Not on file     Physically abused: Not on file     Forced sexual activity: Not on file   Other Topics Concern   • Not on file   Social History Narrative   • Not on file      Social History     Substance and Sexual Activity   Drug Use Never       CURRENT MEDICATIONS  Home Medications    **Home medications have not yet been reviewed for this encounter**         ALLERGIES  Allergies   Allergen Reactions   • Codeine Anaphylaxis   • Opium Anaphylaxis       PHYSICAL EXAM  VITAL SIGNS: /65   Pulse 76   Temp 36.1 °C (96.9 °F) (Temporal)   Resp 19   Ht 1.778 m (5' 10\")   Wt 95.3 kg (210 lb)   SpO2 94%   BMI 30.13 kg/m²   Pulse ox interpretation: Patient is on 2 L little oxygen.    Constitutional: Alert in no apparent distress.  HENT: No signs of trauma, Bilateral external ears normal, Nose normal.   Eyes: Pupils are equal and reactive, Conjunctiva normal, Non-icteric.   Neck: Cervical collar in place, surgical incisions are clean dry and intact,,, No stridor.   Lymphatic: No lymphadenopathy noted.   Cardiovascular: Regular rate and rhythm, no murmurs.   Thorax & Lungs: Normal breath sounds, No respiratory distress, No wheezing, No chest " tenderness.   Abdomen: Bowel sounds normal, Soft, No tenderness, No masses, No pulsatile masses. No peritoneal signs.  Skin: Warm, Dry, No erythema, No rash.   Back: No bony tenderness, No CVA tenderness.   Extremities: Intact distal pulses, 1+ BLLE edema, No tenderness, No cyanosis, Negative Denilson's sign.  Musculoskeletal: Good range of motion in all major joints. No tenderness to palpation or major deformities noted.   Neurologic: Alert, cranial nerves intact, speech is appropriate or not slurred, upper extremities bilaterally exhibit no drift, equal and strong  strength bilaterally, sensation intact to light touch throughout upper extremities. Lower extremities strength 5 out of 5 thigh extension/flexion knee extension/flexion, dorsiflexion plantar flexion. No clonus.sensation intact to light touch.  No focal deficits noted.   Psychiatric: Affect normal, Judgment normal, Mood normal.       DIAGNOSTIC STUDIES / PROCEDURES    Results for orders placed or performed during the hospital encounter of 11/19/20   CBC WITH DIFFERENTIAL   Result Value Ref Range    WBC 11.4 (H) 4.8 - 10.8 K/uL    RBC 4.14 (L) 4.70 - 6.10 M/uL    Hemoglobin 11.9 (L) 14.0 - 18.0 g/dL    Hematocrit 37.1 (L) 42.0 - 52.0 %    MCV 89.6 81.4 - 97.8 fL    MCH 28.7 27.0 - 33.0 pg    MCHC 32.1 (L) 33.7 - 35.3 g/dL    RDW 47.8 35.9 - 50.0 fL    Platelet Count 230 164 - 446 K/uL    MPV 10.2 9.0 - 12.9 fL    Neutrophils-Polys 92.20 (H) 44.00 - 72.00 %    Lymphocytes 2.60 (L) 22.00 - 41.00 %    Monocytes 5.20 0.00 - 13.40 %    Eosinophils 0.00 0.00 - 6.90 %    Basophils 0.00 0.00 - 1.80 %    Nucleated RBC 0.00 /100 WBC    Neutrophils (Absolute) 10.51 (H) 1.82 - 7.42 K/uL    Lymphs (Absolute) 0.30 (L) 1.00 - 4.80 K/uL    Monos (Absolute) 0.59 0.00 - 0.85 K/uL    Eos (Absolute) 0.00 0.00 - 0.51 K/uL    Baso (Absolute) 0.00 0.00 - 0.12 K/uL    NRBC (Absolute) 0.00 K/uL   COMP METABOLIC PANEL   Result Value Ref Range    Sodium 128 (L) 135 - 145 mmol/L     Potassium 4.2 3.6 - 5.5 mmol/L    Chloride 94 (L) 96 - 112 mmol/L    Co2 24 20 - 33 mmol/L    Anion Gap 10.0 7.0 - 16.0    Glucose 204 (H) 65 - 99 mg/dL    Bun 23 (H) 8 - 22 mg/dL    Creatinine 0.89 0.50 - 1.40 mg/dL    Calcium 8.4 (L) 8.5 - 10.5 mg/dL    AST(SGOT) 19 12 - 45 U/L    ALT(SGPT) 27 2 - 50 U/L    Alkaline Phosphatase 52 30 - 99 U/L    Total Bilirubin 0.5 0.1 - 1.5 mg/dL    Albumin 3.5 3.2 - 4.9 g/dL    Total Protein 6.2 6.0 - 8.2 g/dL    Globulin 2.7 1.9 - 3.5 g/dL    A-G Ratio 1.3 g/dL   TROPONIN   Result Value Ref Range    Troponin T 24 (H) 6 - 19 ng/L   ESTIMATED GFR   Result Value Ref Range    GFR If African American >60 >60 mL/min/1.73 m 2    GFR If Non African American >60 >60 mL/min/1.73 m 2   PERIPHERAL SMEAR REVIEW   Result Value Ref Range    Peripheral Smear Review see below    PLATELET ESTIMATE   Result Value Ref Range    Plt Estimation Normal    MORPHOLOGY   Result Value Ref Range    RBC Morphology Normal    DIFFERENTIAL MANUAL   Result Value Ref Range    Manual Diff Status PERFORMED    TROPONIN   Result Value Ref Range    Troponin T 23 (H) 6 - 19 ng/L   EKG   Result Value Ref Range    Report       [facility] Emergency Dept.    Test Date:  2020  Pt Name:    OCTAVIA RIVERA                   Department: ER  MRN:        [id number]                      Room:       RD 02  Gender:     Male                         Technician: [id number]  :        [id number]                   Requested By:ER TRIAGE PROTOCOL  Order #:    [id number]                    Reading MD: CARLOS LOUISE MD    Measurements  Intervals                                Axis  Rate:       80                           P:          27  SC:         146                          QRS:        -33  QRSD:       95                           T:          65  QT:         357  QTc:        412    Interpretive Statements  Sinus rhythm  Left axis deviation  Compared to ECG 2020 14:21:15  Left-axis deviation now present  T-wave  abnormality no longer present  Electronically Signed On 11- 6:08:18 PST by CARLOS LOUISE MD         RADIOLOGY  CT-HEAD W/O   Final Result         1.  Right parafalcine subdural hematoma measuring 2.8 mm, similar compared to prior study.   2.  Nonspecific white matter changes, commonly associated with small vessel ischemic disease.  Associated mild cerebral atrophy is noted.   3.  Atherosclerosis.      US-EXTREMITY VENOUS LOWER BILAT   Final Result        The radiologist's interpretation of all radiological studies have been reviewed and images independently viewed by me.    COURSE & MEDICAL DECISION MAKING  Nursing notes, VS, PMSFHx reviewed in chart.    6:07 AM Patient seen and examined at bedside.Ordered for CBC, CMP, troponin, ECG, CT head, US DVT to evaluate his symptoms.       Patient's records were reviewed, and a mechanical fall resulting in a small subdural but more significant cervical spine injury requiring surgical intervention    7:54 AM  Patient reevaluated, resting comfortably repeat troponin ordered    8:47 AM  Repeat troponin has resulted, patient is still comfortable will plan for discharge    Patient was plan to discharge back to rehab facility.  However the rehab facility is unable to take the patient back.  They are planning to discharge him today.    He is Covid positive and hypoxemic requiring oxygen and as such would not be safe at home, plan for admission, discussed case with hospitalist for admission          This patient was cared for during the COVID-19 pandemic.  History and physical exam may be limited/truncated by the inherent challenges of PPE and the need to decrease staff exposure to novel coronavirus.  Some aspects of disease management may be different to protect staff and help slow the spread of disease. I verified that, if possible, the patient was wearing a mask and I was wearing appropriate PPE every time I encountered the patient.     Current [facility] COVID19 protocol  followed          Decision Making:  This is a 80 y.o. male presenting with an episode of lightheadedness.  I think this was due to him being off his oxygen while he walked to the bathroom.  He is overall well-appearing here. No neurologic signs/symptoms were present to suggest a neurogenic cause such as CVA/TIA.  I did obtain a CT of his head with his recent subdural and there has been no acute change. There was no witnessed seizure activity or history or residual neuro deficit to suggest seizure. The patient has no valvular abnormality on my examination to suggest valvular cause or hypertrophic cardiomyopathy. The ekg does not show any evidence of arrythmia, prolonged intervals, early excitation, or other abnormality such as an accessory pathway, qt prolongation, or brugada syndrome. ACS or MI are unlikely causes given nature of sx, unremarkable ECG and given the patients improvement the likely of acs of mi is very low.  I did consider pulmonary embolism as well as the patient has had a prolonged hospitalization, he has no chest pain or shortness of breath, I obtained screening ultrasounds that were negative so this does seem unlikely.  There is no evidence of metabolic abnormalities to explain this episode of syncope on labs.  As the patient is now improved there is no evidence of emergent toxic, metabolic, or endocrine abnormalities.    Given his oxygen requirement and Covid, patient will be admitted for further care in guarded condition      FINAL IMPRESSION  1. Lightheadedness    2. COVID-19    3. Hypoxia         The note accurately reflects work and decisions made by me.  Simón Schrader M.D.  11/19/2020  9:09 AM

## 2020-11-19 NOTE — DISCHARGE SUMMARY
Rehab Discharge Summary    Admission Date: 11/11/2020    Discharge Date: 11/19/2020    Attending Provider: Dr Wilfredo Chacon    Admission Diagnosis:   Active Hospital Problems    Diagnosis   • *Traumatic subdural hemorrhage (HCC)   • Oropharyngeal dysphagia   • Injury of vertebral artery, right, initial encounter   • C6 cervical fracture (HCC)   • Syncope and collapse   • DM2 (diabetes mellitus, type 2) (HCC)   • Traumatic closed nondisplaced fracture of proximal end of left humerus, initial encounter   • Dyslipidemia   • Neurogenic bladder   • Neurogenic bowel   • Neuropathic pain   • Acute pain due to trauma       Discharge Diagnosis:  Active Hospital Problems    Diagnosis   • *Traumatic subdural hemorrhage (HCC)   • Oropharyngeal dysphagia   • Injury of vertebral artery, right, initial encounter   • C6 cervical fracture (HCC)   • Syncope and collapse   • DM2 (diabetes mellitus, type 2) ([facility])   • Traumatic closed nondisplaced fracture of proximal end of left humerus, initial encounter   • Dyslipidemia   • Neurogenic bladder   • Neurogenic bowel   • Neuropathic pain   • Acute pain due to trauma       HPI per H&P:  The patient is a 80 y.o. right hand dominant male with a past medical history of hypertension, diabetes, hyperlipidemia, prostate cancer, COPD, RACQUEL on CPAP at night, who was admitted to [facility] on 11/5 after mechanical ground-level fall after tripping over something in the parking lot of a golf course.  He was found to have subdural hematoma and C6 burst fracture.     Patient reportedly did have head strike during fall but with loss of consciousness.  He was initially seen at [facility] and transferred to [facility] for neurosurgical intervention.     His outside CT scan of the cervical spine shows C6 burst fracture over C2-C6 anterior cervical disc autofusion, with retropulsion, significant stenosis and bilateral C6-7 jumped facets, status post phase 1: C5-7  anterior discectomy and fusion 11/5, followed by C4-T1 laminectomy and C4-T1 posterior spinal fusion on 11/9 by Dr. Alvarez.  He was noted to have difficulty swallowing and breathing after the second surgery.  He was given epi and dexamethasone with improvement in ability to breeze.     He was noted to have CSF leak during surgical intervention, this was repaired surgically.     He was reported to have left upper extremity weakness and paresthesias.     Patient was evaluated by physiatry and Physical Therapy, Occupational Therapy and Speech Therapy and determined to be appropriate for acute inpatient rehab and was admitted to [facility] on 11/11      Patient was admitted to [facility] on 11/11/2020.     Hospital Course by Problem List:  TBI: Subdural hematoma, mechanical fall, no fractures  -Nonsurgical management  -Limit Haldol or benzodiazepines as these been shown to alter CNS recovery  -Keppra 500 mg twice daily for posttraumatic seizure prophylaxis, total of 7 days, no seizure activity reported, DC Keppra as no episodes of seizure  -No need for neuro stimulants at this time  -Discussed with the wife patient should not be driving until cleared by 's evaluation, this can be ordered by Dr. 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 Alvarez.  Left upper extremity weakness and paresthesias.  -Dexamethasone 4 mg IV every 6 hours x4 doses, transitioned to p.o. on 11/11.  Decreased dose to 4 mg every 12 hours on 11/12 -> 2mg q12hr, discontinue on 11/18.  Monitor for changes and strength or sensation  -Continue comprehensive acute inpatient rehabilitation     Agitation: Impulsive behavior, pleasantly confused  -No need for propanolol during acute rehabilitation, significant improvement in use of  call light and insight.     Cough/Covid positive: History of COPD, management as below  -Covid positive on 11/18  -On-call physician was contacted by night RN reporting that patient's requiring increased supplemental oxygen, requiring 4 L to maintain greater than 90%, also had episode of question syncope, unresponsiveness.  -Patient was sent to the emergency room for further evaluation and work-up.  Wife was contacted.  -Place patient on droplet and eye and contact precautions     COPD: will f/u with family on his home inhalers  -Consult respiratory therapy  -Supplemental oxygen as per guidelines  -Anoro Ellipta 1 puff every day  -Flovent 88 mcg every 12 hours     RACQUEL: f/u with CPAP settings  -CPAP on at night, home settings     Diabetes: Type II, With hyperglycemia, worsened by dexamethasone  -Januvia 100 mg daily  -Metformin ER 1000 mg every afternoon with meal  -Sliding scale insulin  -FSBG before every meal and nightly     Neurogenic bladder: Monitor PVRs  -Successful voiding trial with minimal PVRs, less than 50 cc, DC voiding trial     Neurogenic bowel:  -Upper motor neuron neurogenic bowel program with Colace 200 mg twice daily, senna 2 tablets q. noon Dulcolax  -KUB 11/11 shows no significant stool load, moderately hard stool present in the descending colon.     Potential for orthostatic hypotension: With cervical spine injury patient is at high risk for orthostatic hypotension  -Midodrine 5 mg every 4 hours as needed systolic blood pressure less than 100     Dysphagia  Following an anterior approach for cervical spinal fusion, dysphasia is very common. Plan to have the speech-language pathology team continue to evaluate the patient and make recommendations for the proper diet as the patient continues to progress.     Pain  #Neuropathic pain:  -Gabapentin 900 mg 3 times a day     #Acute pain, posttraumatic  -DC Tylenol 1000 mg 3 times daily, can use this as needed, educated not to take more than 4 gm per  day  -Oxycodone 5-10 mg every 3 hours as needed pain  -Lidocaine patch either side of posterior neck, on for 12 hours off for 12 hours  -DC Robaxin, if pain and shoulders continues will consider restarting Robaxin 500 mg 3 times daily     Vitamin D deficiency: Vitamin D level of 36 on admission, goal of greater than 30  -No need for cholecalciferol supplementation at this time     DVT prophylaxis  -Lovenox 40 mg daily.  This medication may be necessary for up to 3 months depending on the functional status and clinical situation of the patient as the injury evolves.       Functional Status at Discharge  Eating:  Independent  Eating Description:  Increased time, Set-up of equipment or meal/tube feeding  Grooming:  Stand by Assist(Dl for combing hair,mod I for brushing teeth/washing hands)  Grooming Description:  Seated in wheelchair at sink, Supervision for safety  Bathing:  Supervision(while incorporating sitting/standing w/ grab bar)  Bathing Description:  Adaptive equipment, Grab bar, Hand held shower, Tub bench, Assit with back, Initial preparation for task, Supervision for safety  Upper Body Dressing:  Minimal Assist  Upper Body Dressing Description:  (Min A for facial mask. Attempted doffing shirt )  Lower Body Dressing:  Modified Independent  Lower Body Dressing Description:  Grab bar, Cues for spinal precautions, Increased time, Initial preparation for task, Set-up of equipment, Supervision for safety, Verbal cueing     Walk:  Modified Independent  Distance Walked:  500  Number of Times Distance Was Traveled:  2  Assistive Device:  None  Gait Deviation:  Increased Base Of Support  Wheelchair:  Refused(d/t amb status)  Distance Propelled:  170   Wheelchair Description:  Extra time(B UE)  Stairs Supervised  Stairs Description Hand rails  Discharge Location: Home  Patient Discharging with Assist of: Spouse / Significant Other  Level of Supervision Required Upon Discharge: Intermittent Supervision  Recommended  Equipment for Discharge: None  Recommeded Services Upon Discharge: Outpatient Physical Therapy  Long Term Goals Met: 4  Long Term Goals Not Met: 0  Criteria for Termination of Services: Maximum Function Achieved for Inpatient Rehabilitation  Comprehension:  Independent  Comprehension Description:  Verbal cues, Glasses, Hearing aids/amplifiers  Expression:  Independent  Expression Description:     Social Interaction:  Independent  Social Interaction Description:     Problem Solving:  Minimal Assist  Problem Solving Description:  Verbal cueing, Therapy schedule, Increased time  Memory:  Minimal Assist  Memory Description:  Verbal cueing, Therapy schedule  Progress since Admit: Patient appears to tolerate level 7 easy to chew diet and thin liquids without overt s/sx of aspiration. SCCAN completed with final score of 80 indicating mild cognitive deficits. Patient demonstrated difficulties on memory subtests only.  Discharge Location : Home  Patient Discharging with Assist of: Spouse / Significant Other  Level of Supervision Required: Intermittent Supervision  Recommended Services Upon Discharge: Home Health Speech Therapy  Long Term Goals Met: 1/1  Criteria for Termination of Services: Maximum Function Achieved for Inpatient Rehabilitation    Wilfredo DOYLE D.O., personally performed a complete drug regimen review and no potential clinically significant medication issues were identified.   Discharge Medication:     Medication List      START taking these medications      Instructions   calcium carbonate 500 MG Chew  Commonly known as: TUMS  Replaces: Calcium 600 MG Tabs   Chew 1 Tab every morning.  Dose: 500 mg     gabapentin 300 MG Caps  Commonly known as: NEURONTIN   Take 3 Caps by mouth 3 times a day.  Dose: 900 mg     sennosides 8.6 MG Tabs  Commonly known as: [facility] Senna   Doctor's comments: qnoon  Take 2 Tabs by mouth every day at noon.  Dose: 17.2 mg        CONTINUE taking these medications      Instructions    albuterol 108 (90 Base) MCG/ACT Aers inhalation aerosol   Inhale 2 Puffs by mouth every 6 hours as needed for Shortness of Breath.  Dose: 2 Puff     docusate sodium 100 MG Caps   Take 1 cap by mouth 2 Times a Day.  Dose: 100 mg     famotidine 20 MG Tabs  Commonly known as: PEPCID   Take 1 Tab by mouth 2 Times a Day.  Dose: 20 mg     Januvia 100 MG Tabs  Generic drug: SITagliptin   Take 100 mg by mouth every day.  Dose: 100 mg     lovastatin 40 MG tablet  Commonly known as: MEVACOR   Take 40 mg by mouth every day.  Dose: 40 mg     metFORMIN ER 1000 MG Tb24   Take 1,000 mg by mouth with dinner.  Dose: 1,000 mg     oxyCODONE immediate-release 5 MG Tabs  Commonly known as: ROXICODONE   Take 1 Tab by mouth every 6 hours as needed for up to 7 days.  Dose: 5 mg        STOP taking these medications    benzocaine-menthol 15-3.6 MG Lozg  Commonly known as: CEPACOL     bisacodyl 10 MG Supp  Commonly known as: DULCOLAX     Calcium 600 MG Tabs  Replaced by: calcium carbonate 500 MG Chew     diphenhydrAMINE 25 MG Tabs  Commonly known as: BENADRYL     methylPREDNISolone 4 MG Tbpk  Commonly known as: MEDROL DOSEPAK     MULTIVITAMIN PO     Trelegy Ellipta 100-62.5-25 MCG/INH Aepb  Generic drug: Fluticasone-Umeclidin-Vilant     VITAMIN D3 PO        ASK your doctor about these medications      Instructions   acetaminophen 325 MG Tabs  Commonly known as: Tylenol  Ask about: Which instructions should I use?   Take 650 mg by mouth every four hours as needed.  Dose: 650 mg     Anoro Ellipta 62.5-25 MCG/INH Aepb inhaler  Generic drug: umeclidinium-vilanterol   Inhale 1 Puff every day.  Dose: 1 Puff     enoxaparin 40 MG/0.4ML Soln inj  Commonly known as: LOVENOX  Ask about: Which instructions should I use?   Inject 1 Syringe under the skin every day.  Dose: 1 Syringe     fluticasone 44 MCG/ACT Aero  Commonly known as: FLOVENT HFA   Inhale 2 Puffs 2 times a day.  Dose: 2 Puff     HumuLIN R 100 Unit/mL Soln  Generic drug: insulin regular    Inject 2-9 Units under the skin 4 times a day.  Dose: 2-9 Units     lidocaine 5 % Ptch  Commonly known as: LIDODERM   Place 2 Patches on the skin every 24 hours.  Dose: 2 Patch     methocarbamol 500 MG Tabs  Commonly known as: ROBAXIN  Ask about: Which instructions should I use?   Take 1,000 mg by mouth every four hours as needed (muscle spasm).  Dose: 1,000 mg     Theragran-M Tabs   Take 1 Tab by mouth every day.  Dose: 1 Tab            Discharge Diet:  Regular diet    Discharge Activity:  See discharge PT and OT, mod I in room    Disposition:  Was sent to the emergency room in the setting of increased oxygen needs after Covid positive test.    Equipment:  None    Condition on Discharge:  Guarded    More than 35 minutes was spent on discharging this patient, including face-to-face time, prescription management, and the dictation of this note.    Wilfredo Chacon D.O.    Date of Service: 11/19/2020

## 2020-11-19 NOTE — H&P
Hospital Medicine History & Physical Note    Date of Service  11/19/2020    Primary Care Physician  Armida Obregon M.D.    Consultants  None.     Code Status  Full Code    Chief Complaint  Chief Complaint   Patient presents with   • Syncope       History of Presenting Illness  80 y.o. male who presented 11/19/2020 with past medical history of recent subdural hematoma and cervical fusion who was at a rehab facility who presented to the ED from rehab facility with a near syncopal syncopal event.  He had recently tested positive for SARS-CoV-2/COVID-19 infection on day prior to admission and required 3 L oxygen.  Patient reports he took off his oxygen when he woke up to go to the bathroom and became very lightheaded but did not pass out.  No head trauma.  Patient denies headache, chest pain, neck pain, any focal pain.  Denies shortness of breath, palpitations.    In the ED, CT head was obtained, which showed no acute changes of his recent subdural hematoma.  Afebrile, pulse 80, RR 18, BP 120s/60s.  However, patient noted to be saturating 85% on room air.  ED physician attempted to discharge patient back to his rehab center, but the rehab center reportedly said that they were discharging the patient today.  Given that the patient was hypoxic, hospitalist team was requested to admit the patient for oxygen needs.  Lower extremity Doppler ultrasound was also performed in the ED to screen for DVT, which was negative.      Review of Systems  Review of Systems   Constitutional: Negative for chills, diaphoresis and fever.   HENT: Negative for congestion, sinus pain and sore throat.    Eyes: Negative for discharge and redness.   Respiratory: Negative for cough, sputum production, shortness of breath and wheezing.    Cardiovascular: Negative for chest pain and palpitations.   Gastrointestinal: Negative for abdominal pain, nausea and vomiting.   Genitourinary: Negative for dysuria and urgency.   Musculoskeletal: Negative for  myalgias and neck pain.   Skin: Negative for itching.   Neurological: Positive for dizziness (resolved). Negative for weakness and headaches.   Endo/Heme/Allergies: Negative for environmental allergies.   Psychiatric/Behavioral: Negative for depression. The patient is not nervous/anxious.        Past Medical History   has a past medical history of Chronic obstructive pulmonary disease (HCC), COVID-19, Diabetes (HCC), High cholesterol, Hypertension, and Prostate cancer (HCC).    Surgical History   has a past surgical history that includes cyst excision; cervical disk and fusion anterior (11/5/2020); corpectomy (11/5/2020); pr persaud w/o facetec foramot/dskc 1/2 vrt seg, ce* (11/9/2020); and cervical fusion posterior (Bilateral, 11/9/2020).     Family History  family history is not on file.     Social History   reports that he has quit smoking. He has never used smokeless tobacco. He reports current alcohol use. He reports that he does not use drugs.    Allergies  Allergies   Allergen Reactions   • Codeine Anaphylaxis   • Opium Anaphylaxis       Medications  Prior to Admission Medications   Prescriptions Last Dose Informant Patient Reported? Taking?   JANUVIA 100 MG Tab  Family Member Yes No   Sig: Take 100 mg by mouth every day.   albuterol 108 (90 Base) MCG/ACT Aero Soln inhalation aerosol  Family Member Yes No   Sig: Inhale 2 Puffs by mouth every 6 hours as needed for Shortness of Breath.   calcium carbonate (TUMS) 500 MG Chew Tab   No No   Sig: Chew 1 Tab every morning.   docusate sodium 100 MG Cap   No No   Sig: Take 1 cap by mouth 2 Times a Day.   famotidine (PEPCID) 20 MG Tab   No No   Sig: Take 1 Tab by mouth 2 Times a Day.   gabapentin (NEURONTIN) 300 MG Cap   No No   Sig: Take 3 Caps by mouth 3 times a day.   lovastatin (MEVACOR) 40 MG tablet  Family Member Yes No   Sig: Take 40 mg by mouth every day.   metFORMIN ER 1000 MG TABLET SR 24 HR  Family Member Yes No   Sig: Take 1,000 mg by mouth.   oxyCODONE  immediate-release (ROXICODONE) 5 MG Tab   No No   Sig: Take 1 Tab by mouth every 6 hours as needed for up to 7 days.   sennosides ([employer] SENNA) 8.6 MG Tab   No No   Sig: Take 2 Tabs by mouth every day at noon.      Facility-Administered Medications: None       Physical Exam  Temp:  [36.1 °C (96.9 °F)] 36.1 °C (96.9 °F)  Pulse:  [74-83] 76  Resp:  [16-21] 19  BP: (112-141)/(60-71) 122/65  SpO2:  [94 %-100 %] 94 %    Physical Exam  Vitals signs and nursing note reviewed.   Constitutional:       General: He is not in acute distress.     Appearance: He is not ill-appearing or diaphoretic.   HENT:      Head: Normocephalic and atraumatic.      Nose: No congestion.      Mouth/Throat:      Mouth: Mucous membranes are moist.      Pharynx: Oropharynx is clear.   Eyes:      General: No scleral icterus.     Conjunctiva/sclera: Conjunctivae normal.   Neck:      Comments: Cervical collar in place.   Cardiovascular:      Rate and Rhythm: Normal rate and regular rhythm.      Pulses: Normal pulses.      Heart sounds: Normal heart sounds. No murmur.   Pulmonary:      Effort: Pulmonary effort is normal. No respiratory distress.      Breath sounds: Normal breath sounds. No stridor. No wheezing.      Comments: Saturating 85% on RA.  Been saturating 94% on 4 L nasal cannula.  Abdominal:      General: Abdomen is flat. Bowel sounds are normal. There is no distension.      Palpations: Abdomen is soft.      Tenderness: There is no abdominal tenderness.   Musculoskeletal:         General: No swelling or tenderness.      Right lower leg: Edema (1+ pitting) present.      Left lower leg: Edema (1+ pitting) present.   Skin:     Capillary Refill: Capillary refill takes less than 2 seconds.      Coloration: Skin is not jaundiced.   Neurological:      Mental Status: He is alert and oriented to person, place, and time. Mental status is at baseline.      Cranial Nerves: No cranial nerve deficit.      Comments: Bilateral upper extremity/lower extremity  strength 5 out of 5, sensation intact.  No focal deficits noted.   Psychiatric:         Mood and Affect: Mood normal.         Behavior: Behavior normal.         Laboratory:  Recent Labs     11/19/20  0606   WBC 11.4*   RBC 4.14*   HEMOGLOBIN 11.9*   HEMATOCRIT 37.1*   MCV 89.6   MCH 28.7   MCHC 32.1*   RDW 47.8   PLATELETCT 230   MPV 10.2     Recent Labs     11/19/20  0606   SODIUM 128*   POTASSIUM 4.2   CHLORIDE 94*   CO2 24   GLUCOSE 204*   BUN 23*   CREATININE 0.89   CALCIUM 8.4*     Recent Labs     11/19/20  0606   ALTSGPT 27   ASTSGOT 19   ALKPHOSPHAT 52   TBILIRUBIN 0.5   GLUCOSE 204*         No results for input(s): NTPROBNP in the last 72 hours.      Recent Labs     11/19/20 0606 11/19/20  0800   TROPONINT 24* 23*       Imaging:  CT-HEAD W/O   Final Result         1.  Right parafalcine subdural hematoma measuring 2.8 mm, similar compared to prior study.   2.  Nonspecific white matter changes, commonly associated with small vessel ischemic disease.  Associated mild cerebral atrophy is noted.   3.  Atherosclerosis.      US-EXTREMITY VENOUS LOWER BILAT   Final Result            Assessment/Plan:  I anticipate this patient is appropriate for observation status at this time.    * Acute respiratory failure with hypoxia (HCC)- (present on admission)  Assessment & Plan  Oxygen as needed.  Wean oxygen as tolerated.  If cannot wean by tomorrow, will evaluate for home oxygen.    COVID-19- (present on admission)  Assessment & Plan  Presents with hypoxia, saturating 85% on room air.  Covid positive.  Mild leukocytosis, 11.4.  Given patient with no shortness of breath and no symptoms apart from his recent lightheadedness, will not yet start Decadron.    Procalcitonin pending.  Respiratory therapy.  Oxygen as needed.  Wean as tolerated.  Monitor CBC.    Lightheadedness- (present on admission)  Assessment & Plan  Likely secondary to COVID-19.  No neurological symptoms.  Patient worked up thoroughly in the ED.  CT head  showed no acute change from his recent subdural.  No witnessed seizure activity or residual neuro deficits to suggest seizure.  EKG without evidence of arrhythmia no interval prolongation.  Patient without chest pain.  Screening ultrasound for DVT was negative and patient without shortness of breath, tachycardia.  Monitor.    Elevated troponin  Assessment & Plan  Very mildly elevated at 24 and then 23.  EKG without ischemic changes.  Patient without chest pain.  Unlikely to be ACS.  Monitor clinically.    Hyponatremia- (present on admission)  Assessment & Plan  Asymptomatic.  Likely hypovolemic hyponatremia.  Given COVID-19, IV fluid use must be judicious.  Encourage good oral p.o. intake.  Monitor BMP.

## 2020-11-19 NOTE — ASSESSMENT & PLAN NOTE
Likely secondary to COVID-19.  No neurological symptoms.  Patient worked up thoroughly in the ED.  CT head showed no acute change from his recent subdural.  No witnessed seizure activity or residual neuro deficits to suggest seizure.  EKG without evidence of arrhythmia no interval prolongation.  Patient without chest pain.  Screening ultrasound for DVT was negative and patient without shortness of breath, tachycardia.  Carotid dopplers negative for stenosis   Monitor.

## 2020-11-19 NOTE — ED NOTES
Patient to be d/c back to [facility]. SW working to arrange transport back to [facility].

## 2020-11-19 NOTE — DISCHARGE PLANNING
Case Management Discharge Instructions  Discharge Date November 19, 2020        Discharge Location: Home with Outpatient Services     Agency Name / Address / Phone:   [facility] [id number]   [place]   [phone]     Outpatient Therapies: Occupational Therapist, Physical Therapist      NOTE: This information can be found in your final discharge packet that your nurse will give you.         Follow-up Information:     Armida Obregon M.D.  [address] [facility] [address]  [phone]   On 11/20/2020  Primary care appointment Friday @ 10:30AM.     Saira Bob P.A.-C.  [address]  [address] [id number] [phone]   On 11/25/2020  Neurosurgery appointment on Wednesday at 3PM, 2:30 check in. Val is  [phone]. No appointments available in [place] during the time period needed for staple removal.     Valentina Gillespie D.O.  [address] [facility] [address] [phone]   On 12/7/2020  Physical Medicine appointment Monday @ 10AM, 9:30AM check in. To reschedule appointment is [phone].

## 2020-11-19 NOTE — ASSESSMENT & PLAN NOTE
Asymptomatic.  Likely hypovolemic hyponatremia.  Given COVID-19, IV fluid use must be judicious.  Encourage good oral p.o. intake.  Monitor BMP.

## 2020-11-19 NOTE — PROGRESS NOTES
Patient's oxygen saturation at 85% on room air. This writer placed a NC on patient and increased oxygen until improvement was seen at 3 L. Oxygen sat improved to 97%

## 2020-11-19 NOTE — ED NOTES
Report called to [facility] RN. Pt transported by hospital transport via wheelchair to [facility]. Pt stable upon transport on 2L NC. Belongings with patient.

## 2020-11-19 NOTE — DISCHARGE PLANNING
MSW received call from bedside RN. Pt needs to return to [facility]. MSW spoke to [facility]. He will talk to his  and call MSW back.

## 2020-11-19 NOTE — PROGRESS NOTES
Covid test result positive. Will move patient to respiratory unit. Notified MD and administration. Left VM for patient's spouse to call back for update.

## 2020-11-19 NOTE — PROGRESS NOTES
Patient at 4 L via NC, and at 86%. This writer had patient utilize incentive spirometer and gave albuterol, results = no change in status.     Dr Pacheco (on call) notified and said to place him on a non-re breather. If patient falls below 88% and becomes tachypneic after this intervention then he should be sent out.      Patient placed on non re-breather, oxygen decreased to 1.5 L, oxygen saturation at 97%.

## 2020-11-19 NOTE — ASSESSMENT & PLAN NOTE
Presents with hypoxia, saturating 85% on room air, requiring 2L NC  Covid positive.  Mild leukocytosis, 11.4.  Procalcitonin WNL  Respiratory therapy.  Oxygen as needed.  Wean as tolerated.    Started on Decadron 6mg daily x 10 days

## 2020-11-20 ENCOUNTER — APPOINTMENT (OUTPATIENT)
Dept: RADIOLOGY | Facility: MEDICAL CENTER | Age: 80
DRG: 177 | End: 2020-11-20
Attending: STUDENT IN AN ORGANIZED HEALTH CARE EDUCATION/TRAINING PROGRAM
Payer: MEDICARE

## 2020-11-20 ENCOUNTER — PATIENT OUTREACH (OUTPATIENT)
Dept: HEALTH INFORMATION MANAGEMENT | Facility: OTHER | Age: 80
End: 2020-11-20

## 2020-11-20 LAB
ALBUMIN SERPL BCP-MCNC: 3.6 G/DL (ref 3.2–4.9)
ALBUMIN/GLOB SERPL: 1.2 G/DL
ALP SERPL-CCNC: 59 U/L (ref 30–99)
ALT SERPL-CCNC: 33 U/L (ref 2–50)
ANION GAP SERPL CALC-SCNC: 11 MMOL/L (ref 7–16)
AST SERPL-CCNC: 24 U/L (ref 12–45)
BASOPHILS # BLD AUTO: 0.2 % (ref 0–1.8)
BASOPHILS # BLD: 0.02 K/UL (ref 0–0.12)
BILIRUB SERPL-MCNC: 0.5 MG/DL (ref 0.1–1.5)
BUN SERPL-MCNC: 19 MG/DL (ref 8–22)
CALCIUM SERPL-MCNC: 8.9 MG/DL (ref 8.5–10.5)
CHLORIDE SERPL-SCNC: 90 MMOL/L (ref 96–112)
CO2 SERPL-SCNC: 28 MMOL/L (ref 20–33)
CREAT SERPL-MCNC: 0.77 MG/DL (ref 0.5–1.4)
CRP SERPL HS-MCNC: 12.28 MG/DL (ref 0–0.75)
EKG IMPRESSION: NORMAL
EOSINOPHIL # BLD AUTO: 0 K/UL (ref 0–0.51)
EOSINOPHIL NFR BLD: 0 % (ref 0–6.9)
ERYTHROCYTE [DISTWIDTH] IN BLOOD BY AUTOMATED COUNT: 47 FL (ref 35.9–50)
GLOBULIN SER CALC-MCNC: 2.9 G/DL (ref 1.9–3.5)
GLUCOSE BLD-MCNC: 188 MG/DL (ref 65–99)
GLUCOSE SERPL-MCNC: 204 MG/DL (ref 65–99)
HCT VFR BLD AUTO: 39.2 % (ref 42–52)
HGB BLD-MCNC: 12.8 G/DL (ref 14–18)
IMM GRANULOCYTES # BLD AUTO: 0.2 K/UL (ref 0–0.11)
IMM GRANULOCYTES NFR BLD AUTO: 1.9 % (ref 0–0.9)
LYMPHOCYTES # BLD AUTO: 0.51 K/UL (ref 1–4.8)
LYMPHOCYTES NFR BLD: 4.7 % (ref 22–41)
MCH RBC QN AUTO: 29 PG (ref 27–33)
MCHC RBC AUTO-ENTMCNC: 32.7 G/DL (ref 33.7–35.3)
MCV RBC AUTO: 88.7 FL (ref 81.4–97.8)
MONOCYTES # BLD AUTO: 0.84 K/UL (ref 0–0.85)
MONOCYTES NFR BLD AUTO: 7.8 % (ref 0–13.4)
NEUTROPHILS # BLD AUTO: 9.2 K/UL (ref 1.82–7.42)
NEUTROPHILS NFR BLD: 85.4 % (ref 44–72)
NRBC # BLD AUTO: 0 K/UL
NRBC BLD-RTO: 0 /100 WBC
NT-PROBNP SERPL IA-MCNC: 165 PG/ML (ref 0–125)
PLATELET # BLD AUTO: 257 K/UL (ref 164–446)
PMV BLD AUTO: 10.1 FL (ref 9–12.9)
POTASSIUM SERPL-SCNC: 4.1 MMOL/L (ref 3.6–5.5)
PROT SERPL-MCNC: 6.5 G/DL (ref 6–8.2)
RBC # BLD AUTO: 4.42 M/UL (ref 4.7–6.1)
SODIUM SERPL-SCNC: 129 MMOL/L (ref 135–145)
WBC # BLD AUTO: 10.8 K/UL (ref 4.8–10.8)

## 2020-11-20 PROCEDURE — 94660 CPAP INITIATION&MGMT: CPT

## 2020-11-20 PROCEDURE — A9270 NON-COVERED ITEM OR SERVICE: HCPCS | Performed by: GENERAL PRACTICE

## 2020-11-20 PROCEDURE — 86140 C-REACTIVE PROTEIN: CPT

## 2020-11-20 PROCEDURE — 36415 COLL VENOUS BLD VENIPUNCTURE: CPT

## 2020-11-20 PROCEDURE — 83880 ASSAY OF NATRIURETIC PEPTIDE: CPT

## 2020-11-20 PROCEDURE — G0378 HOSPITAL OBSERVATION PER HR: HCPCS

## 2020-11-20 PROCEDURE — 700111 HCHG RX REV CODE 636 W/ 250 OVERRIDE (IP): Performed by: GENERAL PRACTICE

## 2020-11-20 PROCEDURE — 96374 THER/PROPH/DIAG INJ IV PUSH: CPT

## 2020-11-20 PROCEDURE — 82962 GLUCOSE BLOOD TEST: CPT

## 2020-11-20 PROCEDURE — 94760 N-INVAS EAR/PLS OXIMETRY 1: CPT

## 2020-11-20 PROCEDURE — 93010 ELECTROCARDIOGRAM REPORT: CPT | Performed by: INTERNAL MEDICINE

## 2020-11-20 PROCEDURE — 99225 PR SUBSEQUENT OBSERVATION CARE,LEVEL II: CPT | Performed by: GENERAL PRACTICE

## 2020-11-20 PROCEDURE — 93880 EXTRACRANIAL BILAT STUDY: CPT

## 2020-11-20 PROCEDURE — 93005 ELECTROCARDIOGRAM TRACING: CPT | Performed by: STUDENT IN AN ORGANIZED HEALTH CARE EDUCATION/TRAINING PROGRAM

## 2020-11-20 PROCEDURE — 85025 COMPLETE CBC W/AUTO DIFF WBC: CPT

## 2020-11-20 PROCEDURE — 80053 COMPREHEN METABOLIC PANEL: CPT

## 2020-11-20 PROCEDURE — 700102 HCHG RX REV CODE 250 W/ 637 OVERRIDE(OP): Performed by: GENERAL PRACTICE

## 2020-11-20 RX ORDER — DEXAMETHASONE 4 MG/1
6 TABLET ORAL DAILY
Status: DISCONTINUED | OUTPATIENT
Start: 2020-11-20 | End: 2020-11-23 | Stop reason: HOSPADM

## 2020-11-20 RX ORDER — THIAMINE MONONITRATE (VIT B1) 100 MG
100 TABLET ORAL DAILY
Status: DISCONTINUED | OUTPATIENT
Start: 2020-11-20 | End: 2020-11-23 | Stop reason: HOSPADM

## 2020-11-20 RX ORDER — FLUTICASONE PROPIONATE 44 UG/1
2 AEROSOL, METERED RESPIRATORY (INHALATION) 2 TIMES DAILY
Status: DISCONTINUED | OUTPATIENT
Start: 2020-11-20 | End: 2020-11-23 | Stop reason: HOSPADM

## 2020-11-20 RX ORDER — FUROSEMIDE 10 MG/ML
20 INJECTION INTRAMUSCULAR; INTRAVENOUS ONCE
Status: COMPLETED | OUTPATIENT
Start: 2020-11-20 | End: 2020-11-20

## 2020-11-20 RX ORDER — ZINC SULFATE 50(220)MG
220 CAPSULE ORAL DAILY
Status: DISCONTINUED | OUTPATIENT
Start: 2020-11-20 | End: 2020-11-23 | Stop reason: HOSPADM

## 2020-11-20 RX ORDER — FUROSEMIDE 10 MG/ML
20 INJECTION INTRAMUSCULAR; INTRAVENOUS
Status: COMPLETED | OUTPATIENT
Start: 2020-11-21 | End: 2020-11-21

## 2020-11-20 RX ORDER — ALBUTEROL SULFATE 90 UG/1
2 AEROSOL, METERED RESPIRATORY (INHALATION) EVERY 6 HOURS PRN
Status: DISCONTINUED | OUTPATIENT
Start: 2020-11-20 | End: 2020-11-23 | Stop reason: HOSPADM

## 2020-11-20 RX ORDER — ASCORBIC ACID 500 MG
1000 TABLET ORAL DAILY
Status: DISCONTINUED | OUTPATIENT
Start: 2020-11-20 | End: 2020-11-23 | Stop reason: HOSPADM

## 2020-11-20 RX ADMIN — FUROSEMIDE 20 MG: 10 INJECTION, SOLUTION INTRAMUSCULAR; INTRAVENOUS at 17:42

## 2020-11-20 ASSESSMENT — FIBROSIS 4 INDEX: FIB4 SCORE: 1.24

## 2020-11-20 ASSESSMENT — ENCOUNTER SYMPTOMS
SPUTUM PRODUCTION: 1
COUGH: 1

## 2020-11-20 NOTE — PROGRESS NOTES
"Nocturnist Overnight Note:     -Rapid Response called when ambulating patient to the bathroom to have a stool as he \"blanked out\" according to nurse and was unresponsive for nearly 30 seconds.  -No head or other trauma: Nurse caught the patient.  -Patient states the episode is identical to the one which occurred before his admission.  >EKG stat, Orthostatics ordered  -Orthostatics were positive between lying and sitting up, and patient had repeated episode of \"blanking out\" while trying to stand up for the orthostatics  -Echo performed on 11/6 had normal EF. EKG on 11/5 borderline left axis deviation. Persistent Left Axis on EKG's this admission, new PVC's captured in latest EKG's.  >Carotid Dopplers ordered  >Tsfer back to main for 12-24 hrs monitoring on Telemetry and Carotid Doppler/Syncope workup  -medications reviewed, does not appear to have medication cause, denies chest pain, palpitations, diaphoresis, shortness of breath, or other symptoms.    "

## 2020-11-20 NOTE — PROGRESS NOTES
RR called at 0130 due to pt having syncopal episode while ambulating to restroom. Pt was non responsive to RN commands. Within 1 minute, pt was responsive and was assisted back to bed.

## 2020-11-20 NOTE — PROGRESS NOTES
Report received by SHANNON Paz. Pt transported to T7 by flex RN. Assumed pt care. Pt is A & O x 4. Pt denies pain at this time and denies further needs. Pt was updated on the plan of care for today. All fall precautions are in place. Tele box is on and rhythm is being monitored.

## 2020-11-20 NOTE — FACE TO FACE
"Face to Face Note  -  Durable Medical Equipment    Marlee Rhoades D.O. - NPI: [id number]  I certify that this patient is under my care and that they had a durable medical equipment(DME)face to face encounter by myself that meets the physician DME face-to-face encounter requirements with this patient on:    Date of encounter:   Patient:                    MRN:                       YOB: 2018  Isaias Ortiz  [id number]  1940     The encounter with the patient was in whole, or in part, for the following medical condition, which is the primary reason for durable medical equipment:  Other - COVID 19    I certify that, based on my findings, the following durable medical equipment is medically necessary:  Oxygen.    HOME O2 Saturation Measurements:(Values must be present for Home Oxygen orders)  Room air sat at rest: 87  Room air sat with amb: 81  With liters of O2: 2, O2 sat at rest with O2: 94  With Liters of O2: 2, O2 sat with amb with O2 : 91  Is the patient mobile?: Yes    My Clinical findings support the need for the above equipment due to:  Hypoxia    Supporting Symptoms: The patient requires supplemental oxygen, as the following interventions have been tried with limited or no improvement: \"Oral and/or IV steroids    If patient feels more short of breath, they can go up to 6 liters per minute and contact healthcare provider.  "

## 2020-11-20 NOTE — PROGRESS NOTES
Rehab Fall Note     Date of fall: 11/11/2020     Time of incident/discovery? 6:05 am     Witnessed or Unwitnessed: unwitnessed     Assisted or unassisted?: unassisted     Staff member present? none     Head strike?: no     What is the patient's orientation status? alert and oriented x 4     Location of fall: bathroom     Was this a fall with therapy? no     Patient had a fall from: bathroom, intentionally sat down the floor to reach for pull ups according to pt.     Injury from fall: none     Persons contacted regarding the fall:      Post fall huddle called:      Persons present at post fall huddle:     Interventions to prevent another fall:     Was the call light used? no     Did the bed or chair alarm sound?      If no alarm sounded, do the alarms work?      If alarm malfunctioned, was a maintenance request submitted?      Was the pt. wearing a seatbelt prior to the fall?     If wearing, did the pt. take off the seatbelt?      What is the patient's transfer status? SBA     Other fall details:      Patient at 6  ;00 am in the morning was placed in the bathroom to pee while CNA and RN placed him in toilet and went on the other room to change somebody, after 5 minutes RN came back and found patient sittting on the floor of the bathroom and reaching for the pull ups. He stated that he don't want to be a burden so he was trying to get the pull ups and try to change it himself. He was assisted by 3 person. No injury noted. Patient was educated and advised to call everytime he needs something.

## 2020-11-20 NOTE — PROGRESS NOTES
[facility] Medicine Daily Progress Note    Date of Service  11/20/2020    Chief Complaint  80 y.o. male admitted 11/19/2020 with near syncope    Hospital Course  This is a 80 year old male with PMHx of subdural hematoma (2.8cm) and a recent cervical fusion, which patient was just with [facility], who presented to the ED from rehab facility with a near syncopal syncopal event.  He had recently tested positive for SARS-CoV-2/COVID-19 infection on day prior to admission and required 3 L oxygen.  Patient reports he took off his oxygen when he woke up to go to the bathroom and became very lightheaded but did not pass out.  No head trauma.  Patient denies headache, chest pain, neck pain, any focal pain.  Denies shortness of breath, palpitations.     Interval Problem Update  Patient seen and examined at bedside. Patient was saturating 85-89% on RA, on 2L NC mid 90s. Carotid dopplers negative for stenosis. Patient admits he has not eaten the past 3 days.     His wife has an autoimmune disorder and is concerned bringing the patient back home. Case and social work, working on placement.     Consultants/Specialty  None    Code Status  Full Code    Disposition  Transfer to [facility], home O2 set up     Review of Systems  Review of Systems   Respiratory: Positive for cough and sputum production.    All other systems reviewed and are negative.       Physical Exam  Temp:  [36.1 °C (97 °F)-36.9 °C (98.5 °F)] 36.1 °C (97 °F)  Pulse:  [80-98] 88  Resp:  [16-22] 18  BP: (105-150)/(63-85) 122/75  SpO2:  [88 %-95 %] 91 %    Physical Exam  Vitals signs and nursing note reviewed.   Constitutional:       General: He is not in acute distress.     Appearance: Normal appearance.   HENT:      Head: Normocephalic and atraumatic.   Eyes:      Extraocular Movements: Extraocular movements intact.      Conjunctiva/sclera: Conjunctivae normal.      Pupils: Pupils are equal, round, and reactive to light.   Neck:      Musculoskeletal: Normal range of motion  and neck supple. No muscular tenderness.   Cardiovascular:      Rate and Rhythm: Normal rate and regular rhythm.      Pulses: Normal pulses.      Heart sounds: No murmur. No friction rub. No gallop.    Pulmonary:      Effort: Pulmonary effort is normal. No respiratory distress.      Breath sounds: Rhonchi present. No wheezing or rales.   Abdominal:      General: Bowel sounds are normal. There is no distension.      Palpations: Abdomen is soft.      Tenderness: There is no abdominal tenderness.   Musculoskeletal: Normal range of motion.         General: No swelling or tenderness.      Right lower leg: No edema.      Left lower leg: No edema.   Skin:     General: Skin is warm and dry.      Capillary Refill: Capillary refill takes less than 2 seconds.      Findings: No bruising, erythema or rash.   Neurological:      General: No focal deficit present.      Mental Status: He is alert and oriented to person, place, and time.         Fluids    Intake/Output Summary (Last 24 hours) at 11/20/2020 1546  Last data filed at 11/20/2020 0849  Gross per 24 hour   Intake 120 ml   Output --   Net 120 ml       Laboratory  Recent Labs     11/19/20  0606 11/20/20  0726   WBC 11.4* 10.8   RBC 4.14* 4.42*   HEMOGLOBIN 11.9* 12.8*   HEMATOCRIT 37.1* 39.2*   MCV 89.6 88.7   MCH 28.7 29.0   MCHC 32.1* 32.7*   RDW 47.8 47.0   PLATELETCT 230 257   MPV 10.2 10.1     Recent Labs     11/19/20  0606 11/20/20  0726   SODIUM 128* 129*   POTASSIUM 4.2 4.1   CHLORIDE 94* 90*   CO2 24 28   GLUCOSE 204* 204*   BUN 23* 19   CREATININE 0.89 0.77   CALCIUM 8.4* 8.9                   Imaging  US-CAROTID DOPPLER BILAT   Final Result      CT-HEAD W/O   Final Result         1.  Right parafalcine subdural hematoma measuring 2.8 mm, similar compared to prior study.   2.  Nonspecific white matter changes, commonly associated with small vessel ischemic disease.  Associated mild cerebral atrophy is noted.   3.  Atherosclerosis.      US-EXTREMITY VENOUS LOWER  BILAT   Final Result           Assessment/Plan  * COVID-19- (present on admission)  Assessment & Plan  Presents with hypoxia, saturating 85% on room air, requiring 2L NC  Covid positive.  Mild leukocytosis, 11.4.  Procalcitonin WNL  Respiratory therapy.  Oxygen as needed.  Wean as tolerated.    Started on Decadron 6mg daily x 10 days     Acute respiratory failure with hypoxia (HCC)- (present on admission)  Assessment & Plan  Home O2 eval performed, will require 2L NC, orders placed     Elevated troponin- (present on admission)  Assessment & Plan  Very mildly elevated at 24 and then 23.  EKG without ischemic changes.  Patient without chest pain.  Unlikely to be ACS.  Monitor clinically.    Hyponatremia- (present on admission)  Assessment & Plan  Asymptomatic.  Likely hypovolemic hyponatremia.  Given COVID-19, IV fluid use must be judicious.  Encourage good oral p.o. intake.  Monitor BMP.    Lightheadedness- (present on admission)  Assessment & Plan  Likely secondary to COVID-19.  No neurological symptoms.  Patient worked up thoroughly in the ED.  CT head showed no acute change from his recent subdural.  No witnessed seizure activity or residual neuro deficits to suggest seizure.  EKG without evidence of arrhythmia no interval prolongation.  Patient without chest pain.  Screening ultrasound for DVT was negative and patient without shortness of breath, tachycardia.  Carotid dopplers negative for stenosis   Monitor.       VTE prophylaxis: SCDs, Lovenox

## 2020-11-20 NOTE — PROGRESS NOTES
Pt was stood up to walk to bathroom. Pt had syncopal episode and this RN assisted pt to floor. Pt did not hit head. Pt was unresponsive on floor. Rapid response was called. Within a minute pt was responsive and assisted back to bed.

## 2020-11-20 NOTE — DISCHARGE PLANNING
Anticipated Discharge Disposition: Covid Housing     Action: LSW called Pt's wife who confirmed that she has an autoimmune disorder that she taks medications for and is very worried about her health if her  discharges and is still contagious with Covid. She would like him to go to Covid Housing if possible.     LSW attempted to call Pt's room phone, no answer. LSW asked Pt's BS RN to help Pt call LSW next time she goes into Pt's room.     Barriers to Discharge: Housing    Plan: LSW to discuss covid housing with Pt.

## 2020-11-20 NOTE — DISCHARGE PLANNING
JEAN CARLOS called patient over hospital phone, he was unable to hear me, so JEAN CARLOS called spouse, Gladys for assessment.     Patient is a 80-year old  man who lives in [place] with spouse, Gladys. Patient is a retired . Has Medicare/[employer] insurance.     PCP is Dr. Obregon in [place]. Patient is also seen by pulmonologist, oncologist (Dr. Galeana), and podiatrist (Dr. Maxwell). On 11/5/20 patient was admitted for broken neck, then d/c from hospital to [facility]. Patient is now readmitted on 11/19 from syncope and testing positive for COVID-19.     Prior to broken neck, patient had not issues with ADLs or IADLs. No DME at home. Has CPAP machine for COPD, but no O2 attached. On room air at this time.     Pharmacy is [facility] in [place], [place]. No issues with drugs, alcohol, and mental health.     Unsure if patient needs to return to Rehab or SNF level for ongoing therapy. Anticipate PT/OT assessments will provide more information. If patient is going home, spouse is able to pick him up.     JEAN CARLOS encouraged spouse to get COVID test, provided her number to [facility] in [place]. She reports she has an autoimmune disorder and is high risk.     JEAN CARLOS directed her to Mare EVANGELISTA for d/c planning.     Care Transition Team Assessment    Information Source  Orientation : Oriented x 4  Information Given By: Patient, Spouse  Informant's Name: Gladys Ray  Who is responsible for making decisions for patient? : Patient    Readmission Evaluation  Is this a readmission?: Yes - planned readmission    Elopement Risk  Legal Hold: No  Ambulatory or Self Mobile in Wheelchair: No-Not an Elopement Risk         Discharge Preparedness  What is your plan after discharge?: Home with help  What are your discharge supports?: Spouse  Prior Functional Level: Ambulatory, Independent with Activities of Daily Living, Independent with Medication Management  Difficulity with ADLs: None  Difficulity with IADLs: None    Functional Assesment  Prior  Functional Level: Ambulatory, Independent with Activities of Daily Living, Independent with Medication Management    Finances  Financial Barriers to Discharge: No  Prescription Coverage: Yes     Advance Directive  Advance Directive?: None    Domestic Abuse  Have you ever been the victim of abuse or violence?: No    Psychological Assessment  History of Substance Abuse: None  History of Psychiatric Problems: No  Non-compliant with Treatment: No  Newly Diagnosed Illness: Yes    Discharge Risks or Barriers  Discharge risks or barriers?: Complex medical needs  Patient risk factors: Complex medical needs, Vulnerable adult    Anticipated Discharge Information  Discharge Address: TBD  Discharge Contact Phone Number: [phone]

## 2020-11-20 NOTE — PROGRESS NOTES
While attempting to obtain orthostatic vitals signs, pt had another syncopal episode. This occurred while pt was standing. Pt became non responsive and was placed back onto the bed. Pt became responsive after 1 minute. MD guzman made aware. Transfer orders placed.

## 2020-11-21 LAB
ANION GAP SERPL CALC-SCNC: 11 MMOL/L (ref 7–16)
BUN SERPL-MCNC: 27 MG/DL (ref 8–22)
CALCIUM SERPL-MCNC: 9.1 MG/DL (ref 8.5–10.5)
CHLORIDE SERPL-SCNC: 93 MMOL/L (ref 96–112)
CO2 SERPL-SCNC: 28 MMOL/L (ref 20–33)
CREAT SERPL-MCNC: 0.91 MG/DL (ref 0.5–1.4)
CRP SERPL HS-MCNC: 13.5 MG/DL (ref 0–0.75)
GLUCOSE SERPL-MCNC: 194 MG/DL (ref 65–99)
POTASSIUM SERPL-SCNC: 3.8 MMOL/L (ref 3.6–5.5)
SODIUM SERPL-SCNC: 132 MMOL/L (ref 135–145)

## 2020-11-21 PROCEDURE — 80048 BASIC METABOLIC PNL TOTAL CA: CPT

## 2020-11-21 PROCEDURE — 96376 TX/PRO/DX INJ SAME DRUG ADON: CPT

## 2020-11-21 PROCEDURE — 700102 HCHG RX REV CODE 250 W/ 637 OVERRIDE(OP): Performed by: GENERAL PRACTICE

## 2020-11-21 PROCEDURE — 700111 HCHG RX REV CODE 636 W/ 250 OVERRIDE (IP): Performed by: GENERAL PRACTICE

## 2020-11-21 PROCEDURE — A9270 NON-COVERED ITEM OR SERVICE: HCPCS | Performed by: GENERAL PRACTICE

## 2020-11-21 PROCEDURE — 770006 HCHG ROOM/CARE - MED/SURG/GYN SEMI*

## 2020-11-21 PROCEDURE — 94760 N-INVAS EAR/PLS OXIMETRY 1: CPT

## 2020-11-21 PROCEDURE — 99232 SBSQ HOSP IP/OBS MODERATE 35: CPT | Performed by: GENERAL PRACTICE

## 2020-11-21 PROCEDURE — 36415 COLL VENOUS BLD VENIPUNCTURE: CPT

## 2020-11-21 PROCEDURE — 86140 C-REACTIVE PROTEIN: CPT

## 2020-11-21 PROCEDURE — 96372 THER/PROPH/DIAG INJ SC/IM: CPT

## 2020-11-21 PROCEDURE — 94640 AIRWAY INHALATION TREATMENT: CPT

## 2020-11-21 RX ORDER — GUAIFENESIN 600 MG/1
600 TABLET, EXTENDED RELEASE ORAL EVERY 12 HOURS
Status: COMPLETED | OUTPATIENT
Start: 2020-11-21 | End: 2020-11-23

## 2020-11-21 RX ADMIN — UMECLIDINIUM BROMIDE AND VILANTEROL TRIFENATATE 1 PUFF: 62.5; 25 POWDER RESPIRATORY (INHALATION) at 06:37

## 2020-11-21 RX ADMIN — ZINC SULFATE 220 MG (50 MG) CAPSULE 220 MG: CAPSULE at 06:31

## 2020-11-21 RX ADMIN — ENOXAPARIN SODIUM 40 MG: 40 INJECTION SUBCUTANEOUS at 10:30

## 2020-11-21 RX ADMIN — GUAIFENESIN 600 MG: 600 TABLET, EXTENDED RELEASE ORAL at 17:22

## 2020-11-21 RX ADMIN — FUROSEMIDE 20 MG: 10 INJECTION, SOLUTION INTRAMUSCULAR; INTRAVENOUS at 06:31

## 2020-11-21 RX ADMIN — Medication 100 MG: at 06:32

## 2020-11-21 RX ADMIN — DEXAMETHASONE 6 MG: 4 TABLET ORAL at 06:31

## 2020-11-21 RX ADMIN — FLUTICASONE PROPIONATE 88 MCG: 44 AEROSOL, METERED RESPIRATORY (INHALATION) at 07:35

## 2020-11-21 RX ADMIN — FLUTICASONE PROPIONATE 88 MCG: 44 AEROSOL, METERED RESPIRATORY (INHALATION) at 20:47

## 2020-11-21 RX ADMIN — OXYCODONE HYDROCHLORIDE AND ACETAMINOPHEN 1000 MG: 500 TABLET ORAL at 06:31

## 2020-11-21 ASSESSMENT — FIBROSIS 4 INDEX
FIB4 SCORE: 1.27
FIB4 SCORE: 1.27

## 2020-11-21 ASSESSMENT — ENCOUNTER SYMPTOMS: COUGH: 1

## 2020-11-21 NOTE — PROGRESS NOTES
Late note :  Patient at 2145 bathroom alarm came on. CCt went to answer light found patient in the bathroom no oxygen on and sink running with him sitting on toilet.  Patient was asked who put him on the toilet and he said that he did.  Right after that he had blank stare and no response.  CCT noticed that his breathing had not changed and rushed to press code assist.  Other staff came quickly and  An estimated time without a response from the patient has been 45-60 seconds.  Patient is medical.  CCT explained what had happened and vital machine was brought in  Blood preassure that was finally read as 80/52 but then we tried other arm and reading even lower.  RNs took manual BP and was higher . Assigned RN and CCT walked patient to the bed after applying strip alarm .

## 2020-11-21 NOTE — DISCHARGE PLANNING
Anticipated Discharge Disposition: TBD, Home with Home Health and O2    Action: LSW received e-mail from [employer], they called Pt's contact number and Pt's wife answered. She told them that Pt will be isolating at home.     LSW called Pt's wife to confirm that she is okay now with Pt coming home. Pt's wife Gladys stated that she doesn't want Pt to go to [facility] anymore because he they don't want him being alone after his neck injury. Gladys is prepared to take care of Pt when he returns home and understands that he is still contagious for Covid-19. Gladys would like Dr. Rhoades to call her as she is concerned that Pt is discharging while he is still needing oxygen. Gladys is worried that Pt may become sicker because Pt is high risk and ands just had surgery.     LSW received O2 choice, Pt has never used O2 and they have no preference as long as his insurance is accepted by the provider. Choice form faxed to [facility].     Pt was already discharging through [facility] and they set up Home Health Services with [facility] on 11/19/20. LSW to f/u on what they need to accept Pt now since he was readmitted.   [facility]  [address]   JOSEPHINE Yuan   [phone]    Barriers to Discharge: O2 acceptance    Plan: Follow up with [facility] to confirm they will still accept Pt after his readmission. Awaiting O2 acceptance.

## 2020-11-21 NOTE — PROGRESS NOTES
Pt ambulated to the restroom and then called for help because he was feeling faint. CNA took vital signs and BP was 80/50 and pt was looking pale. PT was disoriented and was not speaking at this time. After 5 minutes, pt recovered and became more responsive. PT gained color to his face and extremities. BP was taken again and resulted at 116/70.

## 2020-11-22 PROBLEM — R79.89 ELEVATED TROPONIN: Status: RESOLVED | Noted: 2020-11-19 | Resolved: 2020-11-22

## 2020-11-22 PROBLEM — J96.01 ACUTE RESPIRATORY FAILURE WITH HYPOXIA (HCC): Status: RESOLVED | Noted: 2020-11-19 | Resolved: 2020-11-22

## 2020-11-22 PROBLEM — R42 LIGHTHEADEDNESS: Status: RESOLVED | Noted: 2020-11-19 | Resolved: 2020-11-22

## 2020-11-22 PROBLEM — E87.1 HYPONATREMIA: Status: RESOLVED | Noted: 2020-11-19 | Resolved: 2020-11-22

## 2020-11-22 PROCEDURE — 770021 HCHG ROOM/CARE - ISO PRIVATE

## 2020-11-22 PROCEDURE — 700102 HCHG RX REV CODE 250 W/ 637 OVERRIDE(OP): Performed by: GENERAL PRACTICE

## 2020-11-22 PROCEDURE — 700111 HCHG RX REV CODE 636 W/ 250 OVERRIDE (IP): Performed by: GENERAL PRACTICE

## 2020-11-22 PROCEDURE — A9270 NON-COVERED ITEM OR SERVICE: HCPCS | Performed by: GENERAL PRACTICE

## 2020-11-22 PROCEDURE — 99232 SBSQ HOSP IP/OBS MODERATE 35: CPT | Performed by: GENERAL PRACTICE

## 2020-11-22 PROCEDURE — RXMED WILLOW AMBULATORY MEDICATION CHARGE: Performed by: GENERAL PRACTICE

## 2020-11-22 RX ORDER — NICOTINE POLACRILEX 2 MG
1 LOZENGE BUCCAL DAILY
Qty: 30 TAB | Refills: 0 | Status: SHIPPED | OUTPATIENT
Start: 2020-11-22

## 2020-11-22 RX ORDER — FLUTICASONE PROPIONATE 44 UG/1
2 AEROSOL, METERED RESPIRATORY (INHALATION) 2 TIMES DAILY
Qty: 10.6 G | Refills: 0 | Status: SHIPPED | OUTPATIENT
Start: 2020-11-22

## 2020-11-22 RX ORDER — ALBUTEROL SULFATE 90 UG/1
2 AEROSOL, METERED RESPIRATORY (INHALATION) EVERY 6 HOURS PRN
Qty: 8.5 G | Refills: 0 | Status: SHIPPED | OUTPATIENT
Start: 2020-11-22

## 2020-11-22 RX ORDER — SITAGLIPTIN 100 MG/1
100 TABLET, FILM COATED ORAL DAILY
Qty: 30 TAB | Refills: 0 | Status: SHIPPED | OUTPATIENT
Start: 2020-11-22

## 2020-11-22 RX ORDER — LOVASTATIN 40 MG/1
40 TABLET ORAL DAILY
Qty: 30 TAB | Refills: 0 | Status: SHIPPED | OUTPATIENT
Start: 2020-11-22

## 2020-11-22 RX ORDER — DEXAMETHASONE 6 MG/1
6 TABLET ORAL DAILY
Qty: 7 TAB | Refills: 0 | Status: SHIPPED | OUTPATIENT
Start: 2020-11-23

## 2020-11-22 RX ORDER — LANOLIN ALCOHOL/MO/W.PET/CERES
100 CREAM (GRAM) TOPICAL DAILY
Qty: 30 TAB | Refills: 0 | Status: SHIPPED | OUTPATIENT
Start: 2020-11-23

## 2020-11-22 RX ORDER — PSEUDOEPHEDRINE HCL 30 MG
100 TABLET ORAL 2 TIMES DAILY
Qty: 60 CAP | Refills: 0 | Status: SHIPPED | OUTPATIENT
Start: 2020-11-22

## 2020-11-22 RX ORDER — FAMOTIDINE 20 MG/1
20 TABLET, FILM COATED ORAL 2 TIMES DAILY
Qty: 60 TAB | Refills: 0 | Status: SHIPPED | OUTPATIENT
Start: 2020-11-22

## 2020-11-22 RX ORDER — LIDOCAINE 50 MG/G
2 PATCH TOPICAL EVERY 24 HOURS
Qty: 60 PATCH | Refills: 0 | Status: SHIPPED | OUTPATIENT
Start: 2020-11-22

## 2020-11-22 RX ORDER — ASCORBIC ACID 500 MG
1000 TABLET ORAL DAILY
Qty: 60 TAB | Refills: 0 | Status: SHIPPED | OUTPATIENT
Start: 2020-11-23

## 2020-11-22 RX ORDER — ZINC SULFATE 50(220)MG
220 CAPSULE ORAL DAILY
Qty: 30 CAP | Refills: 0 | Status: SHIPPED | OUTPATIENT
Start: 2020-11-23

## 2020-11-22 RX ORDER — GABAPENTIN 300 MG/1
900 CAPSULE ORAL 3 TIMES DAILY
Qty: 270 CAP | Refills: 0 | Status: SHIPPED | OUTPATIENT
Start: 2020-11-22

## 2020-11-22 RX ORDER — SENNOSIDES A AND B 8.6 MG/1
17.2 TABLET, FILM COATED ORAL DAILY
Qty: 60 TAB | Refills: 0 | Status: SHIPPED | OUTPATIENT
Start: 2020-11-22

## 2020-11-22 RX ORDER — METFORMIN HYDROCHLORIDE EXTENDED-RELEASE TABLETS 1000 MG/1
1000 TABLET, FILM COATED, EXTENDED RELEASE ORAL
Qty: 30 TAB | Refills: 0 | Status: SHIPPED | OUTPATIENT
Start: 2020-11-22

## 2020-11-22 RX ADMIN — Medication 100 MG: at 05:26

## 2020-11-22 RX ADMIN — GUAIFENESIN 600 MG: 600 TABLET, EXTENDED RELEASE ORAL at 05:26

## 2020-11-22 RX ADMIN — ZINC SULFATE 220 MG (50 MG) CAPSULE 220 MG: CAPSULE at 05:26

## 2020-11-22 RX ADMIN — OXYCODONE HYDROCHLORIDE AND ACETAMINOPHEN 1000 MG: 500 TABLET ORAL at 05:26

## 2020-11-22 RX ADMIN — FLUTICASONE PROPIONATE 88 MCG: 44 AEROSOL, METERED RESPIRATORY (INHALATION) at 12:35

## 2020-11-22 RX ADMIN — GUAIFENESIN 600 MG: 600 TABLET, EXTENDED RELEASE ORAL at 18:10

## 2020-11-22 RX ADMIN — UMECLIDINIUM BROMIDE AND VILANTEROL TRIFENATATE 1 PUFF: 62.5; 25 POWDER RESPIRATORY (INHALATION) at 06:00

## 2020-11-22 RX ADMIN — DEXAMETHASONE 6 MG: 4 TABLET ORAL at 05:26

## 2020-11-22 RX ADMIN — ENOXAPARIN SODIUM 40 MG: 40 INJECTION SUBCUTANEOUS at 05:26

## 2020-11-22 RX ADMIN — FLUTICASONE PROPIONATE 88 MCG: 44 AEROSOL, METERED RESPIRATORY (INHALATION) at 22:28

## 2020-11-22 ASSESSMENT — ENCOUNTER SYMPTOMS: COUGH: 1

## 2020-11-22 NOTE — DISCHARGE PLANNING
Anticipated Discharge Disposition: COVID housing    Action: CM spoke with pt & wife via phone regarding discharge plan.  Pt and wife are now agreeable with pt going to COVID housing due to wife's immunocompromised status. Pt and wife also agree with HH.  Choice made for [facility]; choice [facility].  [facility].     Prior authorization for Lidocaine 5% patches sent to insurance via [employer] website.  Authorization pending.     Barriers to Discharge:     COVID Housing placement  HH acceptance pending    Plan: Care coordination to follow and assist with discharge to COVID housing.

## 2020-11-22 NOTE — PROGRESS NOTES
Assumed care of PT A&O 4. Pt resting in bed with no signs of labored breathing. On 2L. Tele monitor in place, cardiac rhythm being monitored. Call light within reach, bed in lowest position, upper bed rails up. Pt was updated on plan of care for the day. Will continue to monitor.

## 2020-11-22 NOTE — DISCHARGE PLANNING
Anticipated Discharge Disposition: COVID housing with HH    Action: CM spoke with Sulaiman from [facility] team. Pt is accepted at [facility] but will need DME provider to deliver concentrator to [place] [id number] prior to coordination of transport by his team.  [facility]        Barriers to Discharge: HH acceptance  Oxygen delivery  Insurance auth for Lidocaine patches    Plan: Care coordination to follow up on barriers to discharge.

## 2020-11-22 NOTE — DISCHARGE PLANNING
Agency/Facility Name: [facility]  Spoke To: Nabor  Outcome: Patient or spouse need to call Nabor at [facility] to coordinate delivery of concentrator, teaching and sign paperwork.  Nabor's phone number is [phone]

## 2020-11-22 NOTE — PROGRESS NOTES
Hospital Medicine Daily Progress Note    Date of Service  11/22/2020    Chief Complaint  80 y.o. male admitted 11/19/2020 with near syncope    Hospital Course  This is a 80 year old male with PMHx of subdural hematoma (2.8cm) and a recent cervical fusion, which patient was just with [facility], who presented to the ED from rehab facility with a near syncopal syncopal event.  He had recently tested positive for SARS-CoV-2/COVID-19 infection on day prior to admission and required 3 L oxygen.  Patient reports he took off his oxygen when he woke up to go to the bathroom and became very lightheaded but did not pass out.  No head trauma.  Patient denies headache, chest pain, neck pain, any focal pain.  Denies shortness of breath, palpitations.    Patient was transferred from [facility] secondary to near syncope. Carotid dopplers negative for stenosis.  Positive for orthostatic hypotension.    Interval Problem Update  Patient seen and examined at bedside. Patient is saturating >90% on 2L NC.     His wife has an autoimmune disorder and is concerned bringing the patient back home. Case and social work, working on placement.     Patient will be discharged to [facility] with home health. Anticipate discharge 11/23/2020.    Consultants/Specialty  None    Code Status  Full Code    Disposition  DC to [facility] 11/23/2020 with Home Health     Review of Systems  Review of Systems   Respiratory: Positive for cough.    All other systems reviewed and are negative.       Physical Exam  Temp:  [36 °C (96.8 °F)-36.8 °C (98.3 °F)] 36.8 °C (98.3 °F)  Pulse:  [] 96  Resp:  [18-20] 20  BP: (123-140)/(74-84) 140/74  SpO2:  [90 %-94 %] 90 %    Physical Exam  Vitals signs and nursing note reviewed.   Constitutional:       General: He is not in acute distress.     Appearance: Normal appearance.   HENT:      Head: Normocephalic and atraumatic.   Eyes:      Extraocular Movements: Extraocular movements intact.      Conjunctiva/sclera:  Conjunctivae normal.      Pupils: Pupils are equal, round, and reactive to light.   Neck:      Musculoskeletal: Normal range of motion and neck supple. No muscular tenderness.   Cardiovascular:      Rate and Rhythm: Normal rate and regular rhythm.      Pulses: Normal pulses.      Heart sounds: No murmur. No friction rub. No gallop.    Pulmonary:      Effort: Pulmonary effort is normal. No respiratory distress.      Breath sounds: Rhonchi present. No wheezing or rales.   Abdominal:      General: Bowel sounds are normal. There is no distension.      Palpations: Abdomen is soft.      Tenderness: There is no abdominal tenderness.   Musculoskeletal: Normal range of motion.         General: No swelling or tenderness.      Right lower leg: No edema.      Left lower leg: No edema.   Skin:     General: Skin is warm and dry.      Capillary Refill: Capillary refill takes less than 2 seconds.      Findings: No bruising, erythema or rash.   Neurological:      General: No focal deficit present.      Mental Status: He is alert and oriented to person, place, and time.         Fluids  No intake or output data in the 24 hours ending 11/22/20 1138    Laboratory  Recent Labs     11/20/20  0726   WBC 10.8   RBC 4.42*   HEMOGLOBIN 12.8*   HEMATOCRIT 39.2*   MCV 88.7   MCH 29.0   MCHC 32.7*   RDW 47.0   PLATELETCT 257   MPV 10.1     Recent Labs     11/20/20  0726 11/21/20  0528   SODIUM 129* 132*   POTASSIUM 4.1 3.8   CHLORIDE 90* 93*   CO2 28 28   GLUCOSE 204* 194*   BUN 19 27*   CREATININE 0.77 0.91   CALCIUM 8.9 9.1                   Imaging  US-CAROTID DOPPLER BILAT   Final Result      CT-HEAD W/O   Final Result         1.  Right parafalcine subdural hematoma measuring 2.8 mm, similar compared to prior study.   2.  Nonspecific white matter changes, commonly associated with small vessel ischemic disease.  Associated mild cerebral atrophy is noted.   3.  Atherosclerosis.      US-EXTREMITY VENOUS LOWER BILAT   Final Result            Assessment/Plan  * COVID-19- (present on admission)  Assessment & Plan  Presents with hypoxia, saturating 85% on room air, requiring 2L NC  Covid positive.  Mild leukocytosis, 11.4.  Procalcitonin WNL  Respiratory therapy.  Oxygen as needed.  Wean as tolerated.    Started on Decadron 6mg daily x 10 days        VTE prophylaxis: SCDs, Lovenox

## 2020-11-22 NOTE — PROGRESS NOTES
Patient progressing. Continues 2L of oxygen via NC. Plan for patient to be d/c tomorrow to a COVID home with HH. Wife aware. Personal belongings brought to hospital by wife today. Continue with plan of care.

## 2020-11-22 NOTE — DISCHARGE PLANNING
Agency/Facility Name: [facility]  Spoke To: Nabor  Outcome: Joy will service JOSEPHINE Yuan.  Per , patient will go to [facility].  CCA will advise Nabor at [facility] when he calls back.      Received Choice form at [phone]  Agency/Facility Name: [facility]  Referral sent per Choice form @ [phone]

## 2020-11-22 NOTE — DISCHARGE SUMMARY
Discharge Summary    CHIEF COMPLAINT ON ADMISSION  Chief Complaint   Patient presents with   • Syncope       Reason for Admission  Shortness of breath      CODE STATUS  Full Code    HPI & HOSPITAL COURSE  This is a 80 year old male with PMHx of subdural hematoma (2.8cm) and a recent cervical fusion, which patient was just with [facility], who presented to the ED from rehab facility with a near syncopal syncopal event.  He had recently tested positive for SARS-CoV-2/COVID-19 infection on day prior to admission and required 3 L oxygen.  Patient reports he took off his oxygen when he woke up to go to the bathroom and became very lightheaded but did not pass out.  No head trauma.  Patient denies headache, chest pain, neck pain, any focal pain.  Denies shortness of breath, palpitations.     Patient was transferred from [facility] secondary to near syncope. Carotid dopplers negative for stenosis.  Positive for orthostatic hypotension.    His wife has an autoimmune disorder and is concerned bringing the patient back home. Case and social work, working on placement.     Paient is COVID positive on 2L NC on decadron, will be discharged to [facility].     Therefore, he is discharged in good and stable condition to skilled nursing facility.    The patient met 2-midnight criteria for an inpatient stay at the time of discharge.      FOLLOW UP ITEMS POST DISCHARGE  PCP     DISCHARGE DIAGNOSES  Principal Problem:    COVID-19 POA: Yes  Resolved Problems:    Acute respiratory failure with hypoxia (HCC) POA: Yes      Overview: Secondary to COVID-19 pneumonia.    Lightheadedness POA: Yes    Hyponatremia POA: Yes    Elevated troponin POA: Yes      FOLLOW UP  Future Appointments   Date Time Provider Department Center   12/7/2020 10:00 AM Valentina Gillespie D.O. [facility] None     Armida Obregon M.D.  [address] [facility] [address]  [phone]      Please call to schedule your hospital follow up with your primary care physician. Thank you        MEDICATIONS ON DISCHARGE     Medication List      START taking these medications      Instructions   ascorbic acid 1000 MG tablet  Start taking on: November 23, 2020  Commonly known as: VITAMIN C   Take 1 Tab by mouth every day.  Dose: 1,000 mg     dexamethasone 6 MG Tabs  Start taking on: November 23, 2020  Commonly known as: DECADRON   Take 1 Tab by mouth every day.  Dose: 6 mg     thiamine 100 MG tablet  Start taking on: November 23, 2020  Commonly known as: THIAMINE   Take 1 Tab by mouth every day.  Dose: 100 mg     zinc sulfate 220 (50 Zn) MG Caps  Start taking on: November 23, 2020  Commonly known as: ZINCATE   Take 1 Cap by mouth every day.  Dose: 220 mg        CONTINUE taking these medications      Instructions   albuterol 108 (90 Base) MCG/ACT Aers inhalation aerosol   Inhale 2 Puffs every 6 hours as needed for Shortness of Breath.  Dose: 2 Puff     Anoro Ellipta 62.5-25 MCG/INH Aepb inhaler  Generic drug: umeclidinium-vilanterol   Inhale 1 Puff every day.  Dose: 1 Puff     docusate sodium 100 MG Caps   Take 1 cap by mouth 2 Times a Day.  Dose: 100 mg     famotidine 20 MG Tabs  Commonly known as: PEPCID   Take 1 Tab by mouth 2 Times a Day.  Dose: 20 mg     fluticasone 44 MCG/ACT Aero  Commonly known as: FLOVENT HFA   Inhale 2 Puffs 2 times a day.  Dose: 2 Puff     gabapentin 300 MG Caps  Commonly known as: NEURONTIN   Take 3 Caps by mouth 3 times a day.  Dose: 900 mg     Januvia 100 MG Tabs  Generic drug: SITagliptin   Take 1 Tab by mouth every day.  Dose: 100 mg     lidocaine 5 % Ptch  Commonly known as: LIDODERM   Place 2 Patches on the skin every 24 hours.  Dose: 2 Patch     lovastatin 40 MG tablet  Commonly known as: MEVACOR   Take 1 Tab by mouth every day.  Dose: 40 mg     metFORMIN ER 1000 MG Tb24   Take 1 Tab by mouth with dinner.  Dose: 1,000 mg     sennosides 8.6 MG Tabs  Commonly known as: [facility] Senna   Doctor's comments: qnoon  Take 2 Tabs by mouth every day at noon.  Dose: 17.2 mg      Theragran-M Tabs   Take 1 Tab by mouth every day.  Dose: 1 Tab        STOP taking these medications    acetaminophen 325 MG Tabs  Commonly known as: Tylenol     calcium carbonate 500 MG Chew  Commonly known as: TUMS     enoxaparin 40 MG/0.4ML Soln inj  Commonly known as: LOVENOX     HumuLIN R 100 Unit/mL Soln  Generic drug: insulin regular     methocarbamol 500 MG Tabs  Commonly known as: ROBAXIN     oxyCODONE immediate-release 5 MG Tabs  Commonly known as: ROXICODONE            Allergies  Allergies   Allergen Reactions   • Codeine Anaphylaxis   • Opium Anaphylaxis       DIET  Orders Placed This Encounter   Procedures   • Diet Order Diet: Level 7 - Easy to Chew; Liquid level: Level 0 - Thin     Standing Status:   Standing     Number of Occurrences:   1     Order Specific Question:   Diet:     Answer:   Level 7 - Easy to Chew [22]     Order Specific Question:   Liquid level     Answer:   Level 0 - Thin       ACTIVITY  As tolerated.  Weight bearing as tolerated    LINES, DRAINS, AND WOUNDS  This is an automated list. Peripheral IVs will be removed prior to discharge.  Peripheral IV 11/19/20 20 G Right Forearm (Active)   Site Assessment Clean;Dry;Intact 11/19/20 0610       Wound 11/11/20 Incision Neck Anterior (Active)       Wound 11/11/20 Incision Neck Posterior (Active)       Wound 11/11/20 Incision Neck Posterior Left drain site (Active)       Peripheral IV 11/19/20 20 G Right Forearm (Active)   Site Assessment Clean;Dry;Intact 11/19/20 0610               MENTAL STATUS ON TRANSFER  Level of Consciousness: Alert  Orientation : Oriented x 4       CONSULTATIONS  None    PROCEDURES  None    LABORATORY  Lab Results   Component Value Date    SODIUM 132 (L) 11/21/2020    POTASSIUM 3.8 11/21/2020    CHLORIDE 93 (L) 11/21/2020    CO2 28 11/21/2020    GLUCOSE 194 (H) 11/21/2020    BUN 27 (H) 11/21/2020    CREATININE 0.91 11/21/2020        Lab Results   Component Value Date    WBC 10.8 11/20/2020    HEMOGLOBIN 12.8 (L)  11/20/2020    HEMATOCRIT 39.2 (L) 11/20/2020    PLATELETCT 257 11/20/2020      US-CAROTID DOPPLER BILAT   Final Result      CT-HEAD W/O   Final Result         1.  Right parafalcine subdural hematoma measuring 2.8 mm, similar compared to prior study.   2.  Nonspecific white matter changes, commonly associated with small vessel ischemic disease.  Associated mild cerebral atrophy is noted.   3.  Atherosclerosis.      US-EXTREMITY VENOUS LOWER BILAT   Final Result          Total time of the discharge process exceeds 45 minutes.

## 2020-11-22 NOTE — DISCHARGE PLANNING
Pt's spouse notified she needs to contact [facility] for teaching and paperwork signature. [facility] contact's number given to pt's spouse along with the address of the [facility] site pt will be discharging to for follow up on delivery of concentrator.

## 2020-11-22 NOTE — FACE TO FACE
Face to Face Supporting Documentation - Home Health    The encounter with this patient was in whole or in part the primary reason for home health admission.    Date of encounter:   Patient:                    MRN:                       YOB: 2018  Isaias Ortiz  [id number]  1940     Home health to see patient for:  Physical Therapy evaluation and treatment    Skilled need for:  New Onset Medical Diagnosis COVID 19, cervical neck fusion    Skilled nursing interventions to include:  Comment: PT/OT, home health aide    Homebound status evidenced by:  Need the aid of supportive devices such as crutches, canes, wheelchairs or walkers. Leaving home requires a considerable and taxing effort. There is a normal inability to leave the home.    Community Physician to provide follow up care: Armida Obregon M.D.     Optional Interventions? No      I certify the face to face encounter for this home health care referral meets the CMS requirements and the encounter/clinical assessment with the patient was, in whole, or in part, for the medical condition(s) listed above, which is the primary reason for home health care. Based on my clinical findings: the service(s) are medically necessary, support the need for home health care, and the homebound criteria are met.  I certify that this patient has had a face to face encounter by myself.  Marlee Rhoades D.O. - NPI: [id number]

## 2020-11-22 NOTE — PROGRESS NOTES
[facility] Medicine Daily Progress Note    Date of Service  11/21/2020    Chief Complaint  80 y.o. male admitted 11/19/2020 with near syncope    Hospital Course  This is a 80 year old male with PMHx of subdural hematoma (2.8cm) and a recent cervical fusion, which patient was just with [facility], who presented to the ED from rehab facility with a near syncopal syncopal event.  He had recently tested positive for SARS-CoV-2/COVID-19 infection on day prior to admission and required 3 L oxygen.  Patient reports he took off his oxygen when he woke up to go to the bathroom and became very lightheaded but did not pass out.  No head trauma.  Patient denies headache, chest pain, neck pain, any focal pain.  Denies shortness of breath, palpitations.    Patient was transferred from [facility] secondary to near syncope. Carotid dopplers negative for stenosis.  Positive for orthostatic hypotension.    Interval Problem Update  Patient seen and examined at bedside. Patient is saturating >90% on 2-3L NC. Home O2 pending.    His wife has an autoimmune disorder and is concerned bringing the patient back home. Case and social work, working on placement.     Consultants/Specialty  None    Code Status  Full Code    Disposition  Transfer to [facility], home O2 set up     Review of Systems  Review of Systems   Respiratory: Positive for cough.    All other systems reviewed and are negative.       Physical Exam  Temp:  [36 °C (96.8 °F)-36.6 °C (97.9 °F)] 36 °C (96.8 °F)  Pulse:  [77-94] 82  Resp:  [17-20] 17  BP: (115-121)/(73-96) 118/73  SpO2:  [90 %-95 %] 90 %    Physical Exam  Vitals signs and nursing note reviewed.   Constitutional:       General: He is not in acute distress.     Appearance: Normal appearance.   HENT:      Head: Normocephalic and atraumatic.   Eyes:      Extraocular Movements: Extraocular movements intact.      Conjunctiva/sclera: Conjunctivae normal.      Pupils: Pupils are equal, round, and reactive to light.   Neck:       Musculoskeletal: Normal range of motion and neck supple. No muscular tenderness.   Cardiovascular:      Rate and Rhythm: Normal rate and regular rhythm.      Pulses: Normal pulses.      Heart sounds: No murmur. No friction rub. No gallop.    Pulmonary:      Effort: Pulmonary effort is normal. No respiratory distress.      Breath sounds: Rhonchi present. No wheezing or rales.   Abdominal:      General: Bowel sounds are normal. There is no distension.      Palpations: Abdomen is soft.      Tenderness: There is no abdominal tenderness.   Musculoskeletal: Normal range of motion.         General: No swelling or tenderness.      Right lower leg: No edema.      Left lower leg: No edema.   Skin:     General: Skin is warm and dry.      Capillary Refill: Capillary refill takes less than 2 seconds.      Findings: No bruising, erythema or rash.   Neurological:      General: No focal deficit present.      Mental Status: He is alert and oriented to person, place, and time.         Fluids    Intake/Output Summary (Last 24 hours) at 11/21/2020 1626  Last data filed at 11/21/2020 0751  Gross per 24 hour   Intake 1080 ml   Output 400 ml   Net 680 ml       Laboratory  Recent Labs     11/19/20  0606 11/20/20  0726   WBC 11.4* 10.8   RBC 4.14* 4.42*   HEMOGLOBIN 11.9* 12.8*   HEMATOCRIT 37.1* 39.2*   MCV 89.6 88.7   MCH 28.7 29.0   MCHC 32.1* 32.7*   RDW 47.8 47.0   PLATELETCT 230 257   MPV 10.2 10.1     Recent Labs     11/19/20  0606 11/20/20  0726 11/21/20  0528   SODIUM 128* 129* 132*   POTASSIUM 4.2 4.1 3.8   CHLORIDE 94* 90* 93*   CO2 24 28 28   GLUCOSE 204* 204* 194*   BUN 23* 19 27*   CREATININE 0.89 0.77 0.91   CALCIUM 8.4* 8.9 9.1                   Imaging  US-CAROTID DOPPLER BILAT   Final Result      CT-HEAD W/O   Final Result         1.  Right parafalcine subdural hematoma measuring 2.8 mm, similar compared to prior study.   2.  Nonspecific white matter changes, commonly associated with small vessel ischemic disease.   Associated mild cerebral atrophy is noted.   3.  Atherosclerosis.      US-EXTREMITY VENOUS LOWER BILAT   Final Result           Assessment/Plan  * COVID-19- (present on admission)  Assessment & Plan  Presents with hypoxia, saturating 85% on room air, requiring 2L NC  Covid positive.  Mild leukocytosis, 11.4.  Procalcitonin WNL  Respiratory therapy.  Oxygen as needed.  Wean as tolerated.    Started on Decadron 6mg daily x 10 days     Acute respiratory failure with hypoxia (HCC)- (present on admission)  Assessment & Plan  Home O2 eval performed, will require 2L NC, orders placed     Elevated troponin- (present on admission)  Assessment & Plan  Very mildly elevated at 24 and then 23.  EKG without ischemic changes.  Patient without chest pain.  Unlikely to be ACS.  Monitor clinically.    Hyponatremia- (present on admission)  Assessment & Plan  Asymptomatic.  Likely hypovolemic hyponatremia.  Given COVID-19, IV fluid use must be judicious.  Encourage good oral p.o. intake.  Monitor BMP.    Lightheadedness- (present on admission)  Assessment & Plan  Likely secondary to COVID-19.  No neurological symptoms.  Patient worked up thoroughly in the ED.  CT head showed no acute change from his recent subdural.  No witnessed seizure activity or residual neuro deficits to suggest seizure.  EKG without evidence of arrhythmia no interval prolongation.  Patient without chest pain.  Screening ultrasound for DVT was negative and patient without shortness of breath, tachycardia.  Carotid dopplers negative for stenosis   Monitor.       VTE prophylaxis: SCDs, Lovenox

## 2020-11-23 VITALS
SYSTOLIC BLOOD PRESSURE: 138 MMHG | RESPIRATION RATE: 17 BRPM | BODY MASS INDEX: 28.12 KG/M2 | TEMPERATURE: 98.2 F | DIASTOLIC BLOOD PRESSURE: 74 MMHG | WEIGHT: 196.43 LBS | HEART RATE: 88 BPM | HEIGHT: 70 IN | OXYGEN SATURATION: 92 %

## 2020-11-23 PROCEDURE — 700111 HCHG RX REV CODE 636 W/ 250 OVERRIDE (IP): Performed by: GENERAL PRACTICE

## 2020-11-23 PROCEDURE — A9270 NON-COVERED ITEM OR SERVICE: HCPCS | Performed by: GENERAL PRACTICE

## 2020-11-23 PROCEDURE — 700102 HCHG RX REV CODE 250 W/ 637 OVERRIDE(OP): Performed by: GENERAL PRACTICE

## 2020-11-23 PROCEDURE — 99239 HOSP IP/OBS DSCHRG MGMT >30: CPT | Performed by: GENERAL PRACTICE

## 2020-11-23 RX ADMIN — OXYCODONE HYDROCHLORIDE AND ACETAMINOPHEN 1000 MG: 500 TABLET ORAL at 05:45

## 2020-11-23 RX ADMIN — Medication 100 MG: at 05:45

## 2020-11-23 RX ADMIN — DEXAMETHASONE 6 MG: 4 TABLET ORAL at 05:44

## 2020-11-23 RX ADMIN — ZINC SULFATE 220 MG (50 MG) CAPSULE 220 MG: CAPSULE at 05:44

## 2020-11-23 RX ADMIN — FLUTICASONE PROPIONATE 88 MCG: 44 AEROSOL, METERED RESPIRATORY (INHALATION) at 07:54

## 2020-11-23 RX ADMIN — GUAIFENESIN 600 MG: 600 TABLET, EXTENDED RELEASE ORAL at 05:44

## 2020-11-23 RX ADMIN — ENOXAPARIN SODIUM 40 MG: 40 INJECTION SUBCUTANEOUS at 05:45

## 2020-11-23 NOTE — DISCHARGE PLANNING
@1430  Agency/Facility Name: [facility]  Spoke To: Intake  Outcome: Accepted.    @1405  Agency/Facility Name: [facility]  Spoke To: Intake  Outcome: Processing order.    @1300  Agency/Facility Name: [facility]  Spoke To: Intake  Outcome: Processing order.    Agency/Facility Name: [facility]  Spoke To: Intake  Outcome: Sending to [facility].    @1150  Agency/Facility Name: [facility]  Spoke To: Admission  Outcome: Cannot not treat COVID positive. Will need 14 days with no symptoms.

## 2020-11-23 NOTE — DISCHARGE PLANNING
Anticipated Discharge Disposition: Home with HH and O2    Action: LSW updated BS RN that Pt has been accepted by [facility] O2 and just needs a tank. Per BS RN tank is already at BS. RN will have Pt ready to leave in one hour as Pt's wife will be driving in from [place].     LSW called Pt's wife Raymond to let her know that O2 is ready and she can come  Pt. Raymond will be leaving soon to  Pt.     Barriers to Discharge: None    Plan: Pt to discharge home today with [facility] and [facility]. Wife to transport home.

## 2020-11-23 NOTE — PROGRESS NOTES
Received report from day shift RN.  Assumed care at 1900. Pt denies any discomfort at this time.  Call light within reach. Bed locked and in lowest position.  Non skid socks on

## 2020-11-23 NOTE — DISCHARGE INSTRUCTIONS
Discharge Instructions    Discharged to home by car with relative. Discharged via wheelchair, hospital escort: Yes.  Special equipment needed: Oxygen    Be sure to schedule a follow-up appointment with your primary care doctor or any specialists as instructed.     Discharge Plan:   Diet Plan: Discussed  Activity Level: Discussed  Confirmed Follow up Appointment: Patient to Call and Schedule Appointment  Confirmed Symptoms Management: Discussed  Medication Reconciliation Updated: Yes  Influenza Vaccine Indication: Not indicated: Previously immunized this influenza season and > 8 years of age    I understand that a diet low in cholesterol, fat, and sodium is recommended for good health. Unless I have been given specific instructions below for another diet, I accept this instruction as my diet prescription.   Other diet: Regular Diet    Special Instructions: None    · Is patient discharged on Warfarin / Coumadin?   No     Depression / Suicide Risk    As you are discharged from this [facility] facility, it is important to learn how to keep safe from harming yourself.    Recognize the warning signs:  · Abrupt changes in personality, positive or negative- including increase in energy   · Giving away possessions  · Change in eating patterns- significant weight changes-  positive or negative  · Change in sleeping patterns- unable to sleep or sleeping all the time   · Unwillingness or inability to communicate  · Depression  · Unusual sadness, discouragement and loneliness  · Talk of wanting to die  · Neglect of personal appearance   · Rebelliousness- reckless behavior  · Withdrawal from people/activities they love  · Confusion- inability to concentrate     If you or a loved one observes any of these behaviors or has concerns about self-harm, here's what you can do:  · Talk about it- your feelings and reasons for harming yourself  · Remove any means that you might use to hurt yourself (examples: pills, rope, extension  cords, firearm)  · Get professional help from the community (Mental Health, Substance Abuse, psychological counseling)  · Do not be alone:Call your Safe Contact- someone whom you trust who will be there for you.  · Call your local CRISIS HOTLINE 971-4133 or 001-989-7714  · Call your local Children's Mobile Crisis Response Team [place] (621) 279-1323 or www.[employer]  · Call the toll free National Suicide Prevention Hotlines   · National Suicide Prevention Lifeline 671-630-SBOI (8136)  · National Hope Line Network 800-SUICIDE (488-3558)

## 2020-11-23 NOTE — DISCHARGE PLANNING
"Anticipated Discharge Disposition: Home with HH and O2    Action: LSW spoke with Pt's wife Gladys. She reported  that she looked up the address of the [facility] Housing and it looks like \"a slum\" and she doesn't want her  going there. She would rather he come home and she will take her changes with catching Covid having him at home.     Barriers to Discharge: Home Health will not see pt until he has been symptom free for 14 days.     Plan: LSW to follow up with Dr. Rhoades to discuss Home Health and Pt's discharge plan.     "

## 2020-11-23 NOTE — DISCHARGE PLANNING
Agency/Facility Name: [facility]  Spoke To: Nabor  Outcome: Will deliver extra tanks and concentrator tonight.  Gave authorization to deliver portable tank from the closet to patient to get home.

## 2020-11-23 NOTE — DISCHARGE SUMMARY
Discharge Summary    CHIEF COMPLAINT ON ADMISSION  Chief Complaint   Patient presents with   • Syncope       Reason for Admission  Shortness of breath, presyncope    Admission Date  11/19/2020    CODE STATUS  Full Code    HPI & HOSPITAL COURSE  This is a 80 year old male with PMHx of subdural hematoma (2.8cm) and a recent cervical fusion, which patient was just with [facility], who presented to the ED from rehab facility with a near syncopal syncopal event.  He had recently tested positive for SARS-CoV-2/COVID-19 infection on day prior to admission and required 3 L oxygen.  Patient reports he took off his oxygen when he woke up to go to the bathroom and became very lightheaded but did not pass out.  No head trauma.  Patient denies headache, chest pain, neck pain, any focal pain.  Denies shortness of breath, palpitations.     Patient was transferred from [facility] secondary to near syncope. Carotid dopplers negative for stenosis.  Positive for orthostatic hypotension.    His wife has an autoimmune disorder and is concerned bringing the patient back home. Case and social work, working on placement.      Patient will be discharged to [facility] with home health. Anticipate discharge 11/23/2020. Spoke with wife, she wants the patient to come home now. Will DC home.    Therefore, he is discharged in good and stable condition to home with close outpatient follow-up.    The patient met 2-midnight criteria for an inpatient stay at the time of discharge.    Discharge Date  11/23/2020    FOLLOW UP ITEMS POST DISCHARGE  PCP    DISCHARGE DIAGNOSES  Principal Problem:    COVID-19 POA: Yes  Resolved Problems:    Acute respiratory failure with hypoxia (HCC) POA: Yes      Overview: Secondary to COVID-19 pneumonia.    Lightheadedness POA: Yes    Hyponatremia POA: Yes    Elevated troponin POA: Yes      FOLLOW UP  Future Appointments   Date Time Provider Department Center   12/7/2020 10:00 AM Valentina Gillespie D.O. [facility] None     [facility]  YOANNA Obregon  [address] [facility] [address]  [phone]      Please call to schedule your hospital follow up with your primary care physician. Thank you       MEDICATIONS ON DISCHARGE     Medication List      START taking these medications      Instructions   ascorbic acid 500 MG tablet  Commonly known as: VITAMIN C   Take 2 Tabs by mouth every day.  Dose: 1,000 mg     dexamethasone 6 MG Tabs  Commonly known as: DECADRON   Take 1 Tab by mouth every day.  Dose: 6 mg     thiamine 100 MG tablet  Commonly known as: THIAMINE   Take 1 Tab by mouth every day.  Dose: 100 mg     zinc sulfate 220 (50 Zn) MG Caps  Commonly known as: ZINCATE   Take 1 Cap by mouth every day.  Dose: 220 mg        CONTINUE taking these medications      Instructions   albuterol 108 (90 Base) MCG/ACT Aers inhalation aerosol   Inhale 2 Puffs every 6 hours as needed for Shortness of Breath.  Dose: 2 Puff     Anoro Ellipta 62.5-25 MCG/INH Aepb inhaler  Generic drug: umeclidinium-vilanterol   Inhale 1 Puff every day.  Dose: 1 Puff     docusate sodium 100 MG Caps   Take 1 cap by mouth 2 Times a Day.  Dose: 100 mg     famotidine 20 MG Tabs  Commonly known as: PEPCID   Take 1 Tab by mouth 2 Times a Day.  Dose: 20 mg     fluticasone 44 MCG/ACT Aero  Commonly known as: FLOVENT HFA   Inhale 2 Puffs 2 times a day.  Dose: 2 Puff     gabapentin 300 MG Caps  Commonly known as: NEURONTIN   Take 3 Caps by mouth 3 times a day.  Dose: 900 mg     Januvia 100 MG Tabs  Generic drug: SITagliptin   Take 1 Tab by mouth every day.  Dose: 100 mg     lidocaine 5 % Ptch  Commonly known as: LIDODERM   Place 2 Patches on the skin every 24 hours.  Dose: 2 Patch     lovastatin 40 MG tablet  Commonly known as: MEVACOR   Take 1 Tab by mouth every day.  Dose: 40 mg     metFORMIN ER 1000 MG Tb24   Take 1 Tab by mouth with dinner.  Dose: 1,000 mg     sennosides 8.6 MG Tabs  Commonly known as: [facility] Senna   Doctor's comments: qnoon  Take 2 Tabs by mouth every day at noon.  Dose:  17.2 mg     Theragran-M Tabs   Take 1 Tab by mouth every day.  Dose: 1 Tab        STOP taking these medications    acetaminophen 325 MG Tabs  Commonly known as: Tylenol     calcium carbonate 500 MG Chew  Commonly known as: TUMS     enoxaparin 40 MG/0.4ML Soln inj  Commonly known as: LOVENOX     HumuLIN R 100 Unit/mL Soln  Generic drug: insulin regular     methocarbamol 500 MG Tabs  Commonly known as: ROBAXIN     oxyCODONE immediate-release 5 MG Tabs  Commonly known as: ROXICODONE            Allergies  Allergies   Allergen Reactions   • Codeine Anaphylaxis   • Opium Anaphylaxis       DIET  Orders Placed This Encounter   Procedures   • Diet Order Diet: Level 7 - Easy to Chew; Liquid level: Level 0 - Thin     Standing Status:   Standing     Number of Occurrences:   1     Order Specific Question:   Diet:     Answer:   Level 7 - Easy to Chew [22]     Order Specific Question:   Liquid level     Answer:   Level 0 - Thin       ACTIVITY  As tolerated.  Weight bearing as tolerated    CONSULTATIONS  None    PROCEDURES  None    LABORATORY  Lab Results   Component Value Date    SODIUM 132 (L) 11/21/2020    POTASSIUM 3.8 11/21/2020    CHLORIDE 93 (L) 11/21/2020    CO2 28 11/21/2020    GLUCOSE 194 (H) 11/21/2020    BUN 27 (H) 11/21/2020    CREATININE 0.91 11/21/2020        Lab Results   Component Value Date    WBC 10.8 11/20/2020    HEMOGLOBIN 12.8 (L) 11/20/2020    HEMATOCRIT 39.2 (L) 11/20/2020    PLATELETCT 257 11/20/2020      US-CAROTID DOPPLER BILAT   Final Result      CT-HEAD W/O   Final Result         1.  Right parafalcine subdural hematoma measuring 2.8 mm, similar compared to prior study.   2.  Nonspecific white matter changes, commonly associated with small vessel ischemic disease.  Associated mild cerebral atrophy is noted.   3.  Atherosclerosis.      US-EXTREMITY VENOUS LOWER BILAT   Final Result          Total time of the discharge process exceeds 45 minutes.

## 2020-11-23 NOTE — DISCHARGE PLANNING
Anticipated Discharge Disposition: Home with HH and O2    Action: Pt discussed in IDT rounds. Dr. Rhoades believes Pt is stable enough to wait on for HH until he is cleared of Covid symptoms for 14 days. He is medically cleared and ready to discharge today.    LSW spoke with Pt's wife Gladys, she can  Pt today and drive him home. She wants to know if he has staples still in his neck from his surgery and if its better for him to sleep in his recliner or in his bed.       Barriers to Discharge: [employer] referral being transferred form [place] office to [place] office. .    Plan: Pending confirmation from [facility] office that they have Pt's information and can start services.

## 2020-11-24 ENCOUNTER — PHARMACY VISIT (OUTPATIENT)
Dept: PHARMACY | Facility: MEDICAL CENTER | Age: 80
End: 2020-11-24
Payer: COMMERCIAL

## 2020-11-24 NOTE — DISCHARGE PLANNING
Medication reconcilliation completed. Medications delivered to RN at bedside. Written information regarding the dispensed prescriptions was provided to the patient including the phone number of the pharmacy to call for any questions.       Isaias Ortiz   Home Medication Instructions KATERIN:[id number]    Printed on:11/24/20 0824   Medication Information                      albuterol 108 (90 Base) MCG/ACT Aero Soln inhalation aerosol  Inhale 2 Puffs every 6 hours as needed for Shortness of Breath.             ascorbic acid (VITAMIN C) 500 MG tablet  Take 2 Tabs by mouth every day.             dexamethasone (DECADRON) 6 MG Tab  Take 1 Tab by mouth every day.             docusate sodium 100 MG Cap  Take 1 cap by mouth 2 Times a Day.             famotidine (PEPCID) 20 MG Tab  Take 1 Tab by mouth 2 Times a Day.             fluticasone (FLOVENT HFA) 44 MCG/ACT Aerosol  Inhale 2 Puffs 2 times a day.             gabapentin (NEURONTIN) 300 MG Cap  Take 3 Caps by mouth 3 times a day.             thiamine (THIAMINE) 100 MG tablet  Take 1 Tab by mouth every day.             umeclidinium-vilanterol (ANORO ELLIPTA) 62.5-25 MCG/INH AEROSOL POWDER, BREATH ACTIVATED inhaler  Inhale 1 Puff every day.             zinc sulfate (ZINCATE) 220 (50 Zn) MG Cap  Take 1 Cap by mouth every day.

## 2020-11-24 NOTE — PROGRESS NOTES
Patient received d/c instructions. All questions answered. Belongings packed. Oxygen sent home with patient.

## 2021-05-26 NOTE — PROGRESS NOTES
At 6am this morning patient was placed in the bathroom, positioned in the toilet bowl and advised to pull the red cord. After 5 minutes undersigned came back to see patient sitting on the floor, patient stated that he intentionally sat on the floor to reach the pull up because he wanted to put a fresh pull up. He said he did not hit anything nor having any pain because he intentionally sat down to reach something. Supervisor made aware. Patient was assisted with 3 person back in the chair and placed back to bed examined and noted no injury. Vital signs taken with BP elevation but rechecked 3 times and BP went down to 143/73 which is his usual blood pressure. Patient stated again he has no pain. Dr. Chacon made aware and no order has been made. Reports given to day shifts.   [phone]

## 2023-12-08 NOTE — DISCHARGE PLANNING
CASE MANAGEMENT INITIAL ASSESSMENT     Admit Date:  11/5/2020     I called patient's wife to discuss role of case management / discharge planning / team conference.  The cell phone listed for patient was her phone.  His cell is [phone].  Patient is a  80 y.o. male transferred from [facility].  Wife states he is doing well.  His cerfical fusion was performed by Dr. Alvarez.  Patient's admitting physician for rehab is Dr. Chacon.     Diagnosis: C6 fracture  Trauma     Co-morbidities:         Patient Active Problem List     Diagnosis Date Noted   • Oropharyngeal dysphagia 11/09/2020       Priority: High   • Discharge planning issues 11/09/2020       Priority: High   • Injury of vertebral artery, right, initial encounter 11/06/2020       Priority: High   • Traumatic subdural hemorrhage (HCC) 11/05/2020       Priority: High   • C6 cervical fracture (HCC) 11/05/2020       Priority: High   • Syncope and collapse 11/06/2020       Priority: Medium   • DM2 (diabetes mellitus, type 2) (HCC) 11/05/2020       Priority: Medium   • Traumatic closed nondisplaced fracture of proximal end of left humerus, initial encounter 11/06/2020       Priority: Low   • Trauma 11/05/2020       Priority: Low   • Screening examination for infectious disease 11/05/2020       Priority: Low   • No contraindication to deep vein thrombosis (DVT) prophylaxis 11/05/2020       Priority: Low   • Dyslipidemia         Priority: Low   • Neurogenic bladder 11/11/2020   • Neurogenic bowel 11/11/2020   • Neuropathic pain 11/11/2020   • Acute pain due to trauma 11/11/2020      Prior Living Situation:  Housing / Facility: 1 Story House  Lives with - Patient's Self Care Capacity: Spouse     Prior Level of Function:  Medication Management: Independent  Finances: Independent  Home Management: Independent  Shopping: Independent  Prior Level Of Mobility: Independent Without Device in Community  Driving / Transportation: Driving Independent     Support Systems:  Primary  Patient would like communication of their results via:      Cell Phone:   Telephone Information:   Mobile [phone]     Okay to leave a message containing results? Yes     ": Wife Gladys Ray at [phone]  Other support systems:      Previous Services Utilized:   Equipment Owned: 4-Wheel Walker  Prior Services: None, Home-Independent     Other Information:  Occupation (Pre-Hospital Vocational): Retired Due To Age  Primary Payor Source: Medicare A, Medicare B  Secondary Payor Source: Supplemental Insurance  Primary Care Practitioner : Armida Obregon M.D.  Other MDs: Dr. Alvarez for neurosurgery     Additional Case Management Questions:  Have you ever received case management services for yourself or a family member?  No     Do you feel you have and an understanding of what services  provide?  Explained to patient's wife and she verbalizes understanding.     Do you have any additional questions regarding case management?    \"Do you know when my  will be able to come home?\"  I reviewed team conference and how this will be determined.        CASE MANAGEMENT PLAN OF CARE   Individualized Goals:   1. Would like to have a fww to take home.  2. Will want follow up scheduled with PCP or her PA     Barriers:   1. None at this time.     Patient / Family Goal:  Patient / Family Goal: Patient will return home with his wife.  She is very supportive.  He has 2 entry steps.  He uses C pap at night with no oxygen at home.  He does not have a glucometer or check his blood sugars.  He has a 4ww but his wife states they have been advised that he will need a fww.  We will assist with this.  Will follow.     Plan:  1. Continue to follow patient through hospitalization and provide discharge planning in collaboration with patient, family, physicians and ancillary services.      2. Utilize community resources to ensure a safe discharge.     "
